# Patient Record
Sex: FEMALE | Race: WHITE | NOT HISPANIC OR LATINO | Employment: OTHER | URBAN - NONMETROPOLITAN AREA
[De-identification: names, ages, dates, MRNs, and addresses within clinical notes are randomized per-mention and may not be internally consistent; named-entity substitution may affect disease eponyms.]

---

## 2017-01-12 RX ORDER — LISINOPRIL 10 MG/1
TABLET ORAL
Refills: 0 | COMMUNITY
Start: 2016-11-12 | End: 2018-07-18

## 2017-01-12 RX ORDER — FLUCONAZOLE 150 MG/1
150 TABLET ORAL ONCE
COMMUNITY
End: 2018-07-18

## 2017-01-12 RX ORDER — DEXTROMETHORPHAN HYDROBROMIDE AND PROMETHAZINE HYDROCHLORIDE 15; 6.25 MG/5ML; MG/5ML
SYRUP ORAL 4 TIMES DAILY PRN
COMMUNITY
End: 2018-07-18

## 2017-01-12 RX ORDER — BENZONATATE 100 MG/1
CAPSULE ORAL
Refills: 0 | COMMUNITY
Start: 2016-11-12 | End: 2018-07-18

## 2017-01-12 RX ORDER — AMOXICILLIN AND CLAVULANATE POTASSIUM 500; 125 MG/1; MG/1
TABLET, FILM COATED ORAL
Refills: 0 | COMMUNITY
Start: 2016-11-12 | End: 2017-06-20

## 2017-06-20 ENCOUNTER — HOSPITAL ENCOUNTER (EMERGENCY)
Facility: HOSPITAL | Age: 62
Discharge: HOME | End: 2017-06-20
Attending: FAMILY MEDICINE | Admitting: NURSE PRACTITIONER

## 2017-06-20 ENCOUNTER — APPOINTMENT (OUTPATIENT)
Dept: GENERAL RADIOLOGY | Facility: HOSPITAL | Age: 62
End: 2017-06-20

## 2017-06-20 VITALS
HEART RATE: 76 BPM | TEMPERATURE: 98.7 F | RESPIRATION RATE: 18 BRPM | SYSTOLIC BLOOD PRESSURE: 154 MMHG | HEIGHT: 64 IN | DIASTOLIC BLOOD PRESSURE: 83 MMHG | WEIGHT: 175 LBS | OXYGEN SATURATION: 94 % | BODY MASS INDEX: 29.88 KG/M2

## 2017-06-20 DIAGNOSIS — M79.602 LEFT ARM PAIN: ICD-10-CM

## 2017-06-20 DIAGNOSIS — IMO0001 ELEVATED BP: Primary | ICD-10-CM

## 2017-06-20 LAB
ALBUMIN SERPL-MCNC: 4.2 G/DL (ref 3.4–4.8)
ALBUMIN/GLOB SERPL: 1.1 G/DL (ref 1.1–1.8)
ALP SERPL-CCNC: 107 U/L (ref 38–126)
ALT SERPL W P-5'-P-CCNC: 39 U/L (ref 9–52)
ANION GAP SERPL CALCULATED.3IONS-SCNC: 13 MMOL/L (ref 5–15)
AST SERPL-CCNC: 30 U/L (ref 14–36)
BASOPHILS # BLD AUTO: 0.04 10*3/MM3 (ref 0–0.2)
BASOPHILS NFR BLD AUTO: 0.5 % (ref 0–2)
BILIRUB SERPL-MCNC: 1 MG/DL (ref 0.2–1.3)
BUN BLD-MCNC: 8 MG/DL (ref 7–21)
BUN/CREAT SERPL: 10.1 (ref 7–25)
CALCIUM SPEC-SCNC: 9.1 MG/DL (ref 8.4–10.2)
CHLORIDE SERPL-SCNC: 100 MMOL/L (ref 95–110)
CK MB SERPL-CCNC: 0.5 NG/ML (ref 0–5)
CK SERPL-CCNC: 65 U/L (ref 30–135)
CO2 SERPL-SCNC: 29 MMOL/L (ref 22–31)
CREAT BLD-MCNC: 0.79 MG/DL (ref 0.5–1)
DEPRECATED RDW RBC AUTO: 43.5 FL (ref 36.4–46.3)
EOSINOPHIL # BLD AUTO: 0.16 10*3/MM3 (ref 0–0.7)
EOSINOPHIL NFR BLD AUTO: 2 % (ref 0–7)
ERYTHROCYTE [DISTWIDTH] IN BLOOD BY AUTOMATED COUNT: 13.1 % (ref 11.5–14.5)
GFR SERPL CREATININE-BSD FRML MDRD: 74 ML/MIN/1.73 (ref 45–104)
GLOBULIN UR ELPH-MCNC: 3.7 GM/DL (ref 2.3–3.5)
GLUCOSE BLD-MCNC: 85 MG/DL (ref 60–100)
HCT VFR BLD AUTO: 44.5 % (ref 35–45)
HGB BLD-MCNC: 15.1 G/DL (ref 12–15.5)
HOLD SPECIMEN: NORMAL
HOLD SPECIMEN: NORMAL
IMM GRANULOCYTES # BLD: 0.03 10*3/MM3 (ref 0–0.02)
IMM GRANULOCYTES NFR BLD: 0.4 % (ref 0–0.5)
LYMPHOCYTES # BLD AUTO: 2.93 10*3/MM3 (ref 0.6–4.2)
LYMPHOCYTES NFR BLD AUTO: 35.9 % (ref 10–50)
MCH RBC QN AUTO: 31.2 PG (ref 26.5–34)
MCHC RBC AUTO-ENTMCNC: 33.9 G/DL (ref 31.4–36)
MCV RBC AUTO: 91.9 FL (ref 80–98)
MONOCYTES # BLD AUTO: 0.43 10*3/MM3 (ref 0–0.9)
MONOCYTES NFR BLD AUTO: 5.3 % (ref 0–12)
NEUTROPHILS # BLD AUTO: 4.58 10*3/MM3 (ref 2–8.6)
NEUTROPHILS NFR BLD AUTO: 55.9 % (ref 37–80)
PLATELET # BLD AUTO: 243 10*3/MM3 (ref 150–450)
PMV BLD AUTO: 10.1 FL (ref 8–12)
POTASSIUM BLD-SCNC: 3.8 MMOL/L (ref 3.5–5.1)
PROT SERPL-MCNC: 7.9 G/DL (ref 6.3–8.6)
RBC # BLD AUTO: 4.84 10*6/MM3 (ref 3.77–5.16)
SODIUM BLD-SCNC: 142 MMOL/L (ref 137–145)
TROPONIN I SERPL-MCNC: <0.012 NG/ML
WBC NRBC COR # BLD: 8.17 10*3/MM3 (ref 3.2–9.8)
WHOLE BLOOD HOLD SPECIMEN: NORMAL
WHOLE BLOOD HOLD SPECIMEN: NORMAL

## 2017-06-20 PROCEDURE — 84484 ASSAY OF TROPONIN QUANT: CPT | Performed by: NURSE PRACTITIONER

## 2017-06-20 PROCEDURE — 71020 HC CHEST PA AND LATERAL: CPT

## 2017-06-20 PROCEDURE — 93010 ELECTROCARDIOGRAM REPORT: CPT | Performed by: INTERNAL MEDICINE

## 2017-06-20 PROCEDURE — 99285 EMERGENCY DEPT VISIT HI MDM: CPT

## 2017-06-20 PROCEDURE — 85025 COMPLETE CBC W/AUTO DIFF WBC: CPT | Performed by: FAMILY MEDICINE

## 2017-06-20 PROCEDURE — 82553 CREATINE MB FRACTION: CPT | Performed by: NURSE PRACTITIONER

## 2017-06-20 PROCEDURE — 93005 ELECTROCARDIOGRAM TRACING: CPT | Performed by: NURSE PRACTITIONER

## 2017-06-20 PROCEDURE — 82550 ASSAY OF CK (CPK): CPT | Performed by: NURSE PRACTITIONER

## 2017-06-20 PROCEDURE — 80053 COMPREHEN METABOLIC PANEL: CPT | Performed by: FAMILY MEDICINE

## 2017-06-20 RX ORDER — LISINOPRIL 10 MG/1
10 TABLET ORAL DAILY
Qty: 20 TABLET | Refills: 0 | Status: SHIPPED | OUTPATIENT
Start: 2017-06-20 | End: 2020-09-12 | Stop reason: HOSPADM

## 2017-06-20 RX ORDER — HYDROCODONE BITARTRATE AND ACETAMINOPHEN 5; 325 MG/1; MG/1
1 TABLET ORAL ONCE
Status: COMPLETED | OUTPATIENT
Start: 2017-06-20 | End: 2017-06-20

## 2017-06-20 RX ORDER — CLONIDINE HYDROCHLORIDE 0.2 MG/1
0.2 TABLET ORAL ONCE
Status: COMPLETED | OUTPATIENT
Start: 2017-06-20 | End: 2017-06-20

## 2017-06-20 RX ORDER — HYDROCODONE BITARTRATE AND ACETAMINOPHEN 5; 325 MG/1; MG/1
1 TABLET ORAL EVERY 8 HOURS PRN
Qty: 15 TABLET | Refills: 0 | Status: SHIPPED | OUTPATIENT
Start: 2017-06-20 | End: 2021-08-17

## 2017-06-20 RX ORDER — SODIUM CHLORIDE 0.9 % (FLUSH) 0.9 %
10 SYRINGE (ML) INJECTION AS NEEDED
Status: DISCONTINUED | OUTPATIENT
Start: 2017-06-20 | End: 2017-06-20 | Stop reason: HOSPADM

## 2017-06-20 RX ADMIN — CLONIDINE HYDROCHLORIDE 0.2 MG: 0.2 TABLET ORAL at 18:00

## 2017-06-20 RX ADMIN — HYDROCODONE BITARTRATE AND ACETAMINOPHEN 1 TABLET: 5; 325 TABLET ORAL at 19:30

## 2017-06-20 NOTE — ED PROVIDER NOTES
"Subjective   HPI Comments: Sent from [email or web address] in [place] for elevated BP. Also c/o left arm pain. States she has chronic left arm pain. Pain is worse today, went to clinic and was told she had to come to ED. No fever, n/v/d. + smoker. Hx of HTN and has been off the meds for \"a long time\".      History provided by:  Patient      Review of Systems   Constitutional: Negative.    HENT: Positive for congestion.    Eyes: Negative.    Respiratory: Positive for cough.    Cardiovascular: Negative.    Gastrointestinal: Negative.    Genitourinary: Negative.    Musculoskeletal: Negative.    Skin: Negative.    Neurological: Negative.    Psychiatric/Behavioral: Negative.        Past Medical History:   Diagnosis Date   • Acute bronchitis, unspecified    • Acute exacerbation of chronic bronchitis    • Acute exacerbation of chronic bronchitis    • Acute frontal sinusitis, unspecified    • Acute laryngitis    • Allergic rhinitis    • Anxiety    • Cancer    • Chronic back pain    • Encounter for therapeutic drug monitoring    • Family history of polyps in the colon    • H/O bone density study 02/11/2014   • H/O mammogram 02/11/2014   • Hyperlipidemia    • Hypertension    • Malignant neoplasm of female breast      L breast, sp mastectomy      • Osteopenia    • Panic disorder    • Right lower quadrant pain     rule out appendicitis          Allergies   Allergen Reactions   • Augmentin [Amoxicillin-Pot Clavulanate]    • Ciprofloxacin    • Codeine    • Eggs Or Egg-Derived Products    • Paxil [Paroxetine Hcl]        Past Surgical History:   Procedure Laterality Date   • INJECTION OF MEDICATION  05/26/2016    Silveriokimi(3)   • MASTECTOMY  2003    left breast for cancer   • PAP SMEAR  01/23/2013    negative for malignancy       History reviewed. No pertinent family history.    Social History     Social History   • Marital status: Legally      Spouse name: N/A   • Number of children: N/A   • Years of education: N/A     Social " "History Main Topics   • Smoking status: Current Every Day Smoker     Packs/day: 0.50   • Smokeless tobacco: None   • Alcohol use No   • Drug use: No   • Sexual activity: Not Asked     Other Topics Concern   • None     Social History Narrative   • None           Objective   Physical Exam   Constitutional: She is oriented to person, place, and time. She appears well-developed and well-nourished.   HENT:   Head: Normocephalic.   Eyes: EOM are normal. Pupils are equal, round, and reactive to light.   Neck: Normal range of motion. Neck supple.   Cardiovascular: Normal rate, regular rhythm and normal heart sounds.    Pulmonary/Chest: Effort normal and breath sounds normal.   Abdominal: Soft. Bowel sounds are normal.   Musculoskeletal: Normal range of motion. She exhibits no edema.   Neurological: She is alert and oriented to person, place, and time.   Skin: Skin is warm and dry.   Nursing note and vitals reviewed.  /83  Pulse 76  Temp 98.7 °F (37.1 °C)  Resp 18  Ht 64\" (162.6 cm)  Wt 175 lb (79.4 kg)  SpO2 94%  BMI 30.04 kg/m2      ECG 12 Lead    Date/Time: 6/20/2017 6:22 PM  Performed by: ESTRELLITA VARMA  Authorized by: ESTRELLITA VARMA   Interpreted by physician  Previous ECG: no previous ECG available  Rhythm: sinus rhythm  Rate: normal  BPM: 72  Clinical impression: normal ECG               ED Course  ED Course      Results for orders placed or performed during the hospital encounter of 06/20/17   Comprehensive Metabolic Panel   Result Value Ref Range    Glucose 85 60 - 100 mg/dL    BUN 8 7 - 21 mg/dL    Creatinine 0.79 0.50 - 1.00 mg/dL    Sodium 142 137 - 145 mmol/L    Potassium 3.8 3.5 - 5.1 mmol/L    Chloride 100 95 - 110 mmol/L    CO2 29.0 22.0 - 31.0 mmol/L    Calcium 9.1 8.4 - 10.2 mg/dL    Total Protein 7.9 6.3 - 8.6 g/dL    Albumin 4.20 3.40 - 4.80 g/dL    ALT (SGPT) 39 9 - 52 U/L    AST (SGOT) 30 14 - 36 U/L    Alkaline Phosphatase 107 38 - 126 U/L    Total Bilirubin 1.0 0.2 - 1.3 mg/dL    eGFR Non "  Amer 74 45 - 104 mL/min/1.73    Globulin 3.7 (H) 2.3 - 3.5 gm/dL    A/G Ratio 1.1 1.1 - 1.8 g/dL    BUN/Creatinine Ratio 10.1 7.0 - 25.0    Anion Gap 13.0 5.0 - 15.0 mmol/L   CBC Auto Differential   Result Value Ref Range    WBC 8.17 3.20 - 9.80 10*3/mm3    RBC 4.84 3.77 - 5.16 10*6/mm3    Hemoglobin 15.1 12.0 - 15.5 g/dL    Hematocrit 44.5 35.0 - 45.0 %    MCV 91.9 80.0 - 98.0 fL    MCH 31.2 26.5 - 34.0 pg    MCHC 33.9 31.4 - 36.0 g/dL    RDW 13.1 11.5 - 14.5 %    RDW-SD 43.5 36.4 - 46.3 fl    MPV 10.1 8.0 - 12.0 fL    Platelets 243 150 - 450 10*3/mm3    Neutrophil % 55.9 37.0 - 80.0 %    Lymphocyte % 35.9 10.0 - 50.0 %    Monocyte % 5.3 0.0 - 12.0 %    Eosinophil % 2.0 0.0 - 7.0 %    Basophil % 0.5 0.0 - 2.0 %    Immature Grans % 0.4 0.0 - 0.5 %    Neutrophils, Absolute 4.58 2.00 - 8.60 10*3/mm3    Lymphocytes, Absolute 2.93 0.60 - 4.20 10*3/mm3    Monocytes, Absolute 0.43 0.00 - 0.90 10*3/mm3    Eosinophils, Absolute 0.16 0.00 - 0.70 10*3/mm3    Basophils, Absolute 0.04 0.00 - 0.20 10*3/mm3    Immature Grans, Absolute 0.03 (H) 0.00 - 0.02 10*3/mm3   CK   Result Value Ref Range    Creatine Kinase 65 30 - 135 U/L   CK-MB   Result Value Ref Range    CKMB 0.50 0.00 - 5.00 ng/mL   Troponin   Result Value Ref Range    Troponin I <0.012 <=0.034 ng/mL   Light Blue Top   Result Value Ref Range    Extra Tube hold for add-on    Green Top (Gel)   Result Value Ref Range    Extra Tube Hold for add-ons.    Lavender Top   Result Value Ref Range    Extra Tube hold for add-on    Gold Top - SST   Result Value Ref Range    Extra Tube Hold for add-ons.        XR Chest 2 View   Final Result   CONCLUSION: No evidence of active disease.      Electronically signed by:  Ramírez Cooper MD  6/20/2017 5:50 PM CDT   Workstation: [facility]              HEART Score  History: Slightly suspicious (+0)  ECG: Normal (+0)  Age: 45 through 65 (+1)  Risk Factors: 3 or more risk factors OR history of atherosclerotic disease (+2)  Troponin:  Normal limit or lower (+0)  Total: 3         MDM  Number of Diagnoses or Management Options  Elevated BP:   Left arm pain:   Diagnosis management comments: [id number] Read[facility] #[id number]  Sent from clinic due to BP, which was 223/103 on arrival and left arm pain. She insists the left arm pain is chronic. She had several lymph nodes removed with left mastectomy several years ago and states she has pain in left arm ever since. Also admits to having been on BP meds in past but quit taking them. BP was down to 172/102 when I seen her, then after clonidine down to 154/83. Labs including cardiac enzymes negative. CXR and EKG nml. Will give Rx few norco for the arm pain and lisinopril for the BP and she will follow up with PCP.      Final diagnoses:   Elevated BP   Left arm pain            Jacquelin Jack, APRN  06/21/17 0558

## 2017-06-21 NOTE — DISCHARGE INSTRUCTIONS
Do not drive while taking the pain medication.  Take the lisinopril daily.  Follow up with your PCP in next week.

## 2018-06-28 ENCOUNTER — TRANSCRIBE ORDERS (OUTPATIENT)
Dept: ORTHOPEDIC SURGERY | Facility: CLINIC | Age: 63
End: 2018-06-28

## 2018-06-28 DIAGNOSIS — M77.9 BONE SPUR: Primary | ICD-10-CM

## 2018-07-06 ENCOUNTER — TRANSCRIBE ORDERS (OUTPATIENT)
Dept: MRI IMAGING | Facility: HOSPITAL | Age: 63
End: 2018-07-06

## 2018-07-06 DIAGNOSIS — M54.5 LOW BACK PAIN, UNSPECIFIED BACK PAIN LATERALITY, UNSPECIFIED CHRONICITY, WITH SCIATICA PRESENCE UNSPECIFIED: Primary | ICD-10-CM

## 2018-07-09 ENCOUNTER — APPOINTMENT (OUTPATIENT)
Dept: MRI IMAGING | Facility: HOSPITAL | Age: 63
End: 2018-07-09

## 2018-07-17 ENCOUNTER — HOSPITAL ENCOUNTER (OUTPATIENT)
Dept: MRI IMAGING | Facility: HOSPITAL | Age: 63
Discharge: HOME | End: 2018-07-17
Admitting: PAIN MEDICINE

## 2018-07-17 DIAGNOSIS — M54.5 LOW BACK PAIN, UNSPECIFIED BACK PAIN LATERALITY, UNSPECIFIED CHRONICITY, WITH SCIATICA PRESENCE UNSPECIFIED: ICD-10-CM

## 2018-07-17 PROCEDURE — 72148 MRI LUMBAR SPINE W/O DYE: CPT

## 2018-08-21 ENCOUNTER — TRANSCRIBE ORDERS (OUTPATIENT)
Dept: PHYSICAL THERAPY | Facility: HOSPITAL | Age: 63
End: 2018-08-21

## 2018-08-21 ENCOUNTER — HOSPITAL ENCOUNTER (OUTPATIENT)
Dept: PHYSICAL THERAPY | Facility: HOSPITAL | Age: 63
Setting detail: THERAPIES SERIES
Discharge: HOME | End: 2018-08-21

## 2018-08-21 DIAGNOSIS — M54.5 LOW BACK PAIN, UNSPECIFIED BACK PAIN LATERALITY, UNSPECIFIED CHRONICITY, WITH SCIATICA PRESENCE UNSPECIFIED: Primary | ICD-10-CM

## 2018-08-21 DIAGNOSIS — G89.29 CHRONIC LOW BACK PAIN, UNSPECIFIED BACK PAIN LATERALITY, WITH SCIATICA PRESENCE UNSPECIFIED: Primary | ICD-10-CM

## 2018-08-21 DIAGNOSIS — M54.5 CHRONIC LOW BACK PAIN, UNSPECIFIED BACK PAIN LATERALITY, WITH SCIATICA PRESENCE UNSPECIFIED: Primary | ICD-10-CM

## 2018-08-21 PROCEDURE — 97161 PT EVAL LOW COMPLEX 20 MIN: CPT | Performed by: PHYSICAL THERAPIST

## 2018-08-21 NOTE — THERAPY EVALUATION
Outpatient Physical Therapy Ortho Initial Evaluation  [facility]     Patient Name: Theresa Esquivel  : 1955  MRN: [id number]  Today's Date: 2018      Visit Date: 2018  Attendance:   Subjective % Improvement: N/A  Recert Date: 2018  MD appointment: TBD    Therapy Diagnosis: Chronic low back pain      There is no problem list on file for this patient.       Past Medical History:   Diagnosis Date   • Acute bronchitis, unspecified    • Acute exacerbation of chronic bronchitis (CMS/HCC)    • Acute exacerbation of chronic bronchitis (CMS/HCC)    • Acute frontal sinusitis, unspecified    • Acute laryngitis    • Allergic rhinitis    • Anxiety    • Cancer (CMS/HCC)    • Chronic back pain    • Encounter for therapeutic drug monitoring    • Family history of polyps in the colon    • H/O bone density study 2014   • H/O mammogram 2014   • Hyperlipidemia    • Hypertension    • Malignant neoplasm of female breast (CMS/HCC)      L breast, sp mastectomy      • Osteopenia    • Panic disorder    • Right lower quadrant pain     rule out appendicitis           Past Surgical History:   Procedure Laterality Date   • INJECTION OF MEDICATION  2016    Ramon(3)   • MASTECTOMY      left breast for cancer   • PAP SMEAR  2013    negative for malignancy       Visit Dx:     ICD-10-CM ICD-9-CM   1. Low back pain, unspecified back pain laterality, unspecified chronicity, with sciatica presence unspecified M54.5 724.2             Patient History     Row Name 18 1305             History    Chief Complaint Pain;Difficulty Walking  -BB      Type of Pain Back pain  -BB      Date Current Problem(s) Began --   chronic   -BB      Brief Description of Current Complaint Present today with chronic low back pain. Patient reports living alone and doing laundry and dishes increase pain. Reports being able to stand only short periods of time of about 5-10 minutes. Reports walks short distances  "prior to pain setting in. Denies having a specific incident to cause low back pain. Denies consistant radicular symptoms. Reports having a episode of BLE going numb that \"got me down about a week\"  -BB      Patient/Caregiver Goals Improve mobility;Relieve pain  -BB      Hand Dominance left-handed  -BB      Occupation/sports/leisure activities Retired   -BB      What clinical tests have you had for this problem? MRI  -BB      Results of Clinical Tests Per patient: he hasnt said anything really. \"old fractures\"   -BB         Pain     Pain Location Back  -BB      Pain at Present 6   with medication   -BB      Pain at Best 4  -BB      Pain at Worst 8  -BB      Pain Frequency Constant/continuous  -BB      Pain Description Aching;Dull;Throbbing  -BB      Tolerance Time- Standing 5-10 minutes   -BB      Tolerance Time- Walking short distances   -BB      Is your sleep disturbed? Yes  -BB      Is medication used to assist with sleep? Yes  -BB         Fall Risk Assessment    Any falls in the past year: No  -BB        User Key  (r) = Recorded By, (t) = Taken By, (c) = Cosigned By    Initials Name Provider Type    Maida Winston PT Physical Therapist                PT Ortho     Row Name 08/21/18 4929       Subjective Comments    Subjective Comments See patient history   -BB       Precautions and Contraindications    Precautions Hx of breast cancer  -BB       Subjective Pain    Able to rate subjective pain? yes  -BB    Pre-Treatment Pain Level 6  -BB    Post-Treatment Pain Level 6  -BB       Posture/Observations    Posture/Observations Comments Lying down right leg appears longer than left, when measured GT to tib tub right leg measures shorter than left with bilateral tib tub to medial mallelous. No acute distress. Has hard time lying supine.   -BB       Special Tests/Palpation    Special Tests/Palpation Lumbar/SI  -BB       Lumbosacral Palpation    Lumbosacral Palpation? Yes  -BB    SI Tender;Bilateral:   L>R  -BB    " Spinous Process --   Poor lying tolerance-limited joint mobility exam  -BB    Piriformis Tender;Bilateral:   L>R  -BB    Quadratus Lumborum Tender;Guarded/taut;Bilateral:  -BB    Erector Spinae (Paraspinals) Bilateral:;Tender;Guarded/taut  -BB       General ROM    GENERAL ROM COMMENTS Trunk total ROM is limited in all planes to about 50% total range without a significant change in pain. Pain remained about the same.   -BB       MMT (Manual Muscle Testing)    Additional Documentation General Assessment (Manual Muscle Testing) (Group)  -BB       General Assessment (Manual Muscle Testing)    Comment, General Manual Muscle Testing (MMT) Assessment Right hip and knee in sitting grossly 4-/5, left hip and knee in sitting grossly 3+/5    -BB       Sensation    Sensation WNL? WNL  -BB    Light Touch No apparent deficits  -BB       Balance Skills Training    Balance Comments Sway noted in standing-mild with wide ANNMARIE   -BB       Gait/Stairs Assessment/Training    Comment (Gait/Stairs) No assistive device, slight antalgics noted with a foward flexed posture.   -BB      User Key  (r) = Recorded By, (t) = Taken By, (c) = Cosigned By    Initials Name Provider Type    Maida Winston PT Physical Therapist                      Therapy Education  Education Details: POC  Given: Other (comment)  Program: New  How Provided: Verbal  Provided to: Patient  Level of Understanding: Verbalized           PT OP Goals     Row Name 08/21/18 1935          PT Short Term Goals    STG Date to Achieve 09/18/18  -BB     STG 1 Independent in HEP   -BB     STG 2 Decrease pain to intermittent vs constant   -BB     STG 3 BLE MMT of hip and knee grossly 4/5 or better   -BB     STG 4 Improve standing tolerance to greater than 15' minutes   -BB     STG 5 Modified Oswestry of 45% or less   -BB        Time Calculation    PT Goal Re-Cert Due Date 09/11/18  -BB       User Key  (r) = Recorded By, (t) = Taken By, (c) = Cosigned By    Initials Name Provider Type     Maida Winston PT Physical Therapist                PT Assessment/Plan     Row Name 08/21/18 6913          PT Assessment    Functional Limitations Impaired gait;Limitation in home management;Limitations in community activities;Limitations in functional capacity and performance  -BB     Impairments Range of motion;Posture;Poor body mechanics;Muscle strength;Pain;Gait;Endurance;Balance;Impaired muscle endurance  -BB     Assessment Comments Patient presents today with chronic low back pain and BLE weakness with L>R. Patient has limited tolerance to lying down limiting exam. Patient does appear to have a possible leg length deficit with right leg appearing funcitonally longer than left.This will be monitored as a possible SI malalignment as well due to limitations to supine tolerance. Patient was very cooperative with exam. It is recommended patient to try 1x land and 1x aquatics to assist in improving tissue mobility, improve strength and decrease pain of the lumbar spine.    -BB     Rehab Potential Good  -BB     Patient/caregiver participated in establishment of treatment plan and goals Yes  -BB     Patient would benefit from skilled therapy intervention Yes  -BB        PT Plan    PT Frequency 2x/week  -BB     Predicted Duration of Therapy Intervention (Therapy Eval) 4 weeks   -BB     Planned CPT's? PT EVAL LOW COMPLEXITY: 32512;PT RE-EVAL: 95394;PT THER PROC EA 15 MIN: 89106;PT THER ACT EA 15 MIN: 49357;PT MANUAL THERAPY EA 15 MIN: 85529;PT AQUATIC THERAPY EA 15 MIN: 72847;PT THER SUPP EA 15 MIN  -BB     PT Plan Comments Lumbar stabilization, stretching, manual therapy, strengthening, aquatics, ice/heat PRN   Recheck patients leg length   -BB       User Key  (r) = Recorded By, (t) = Taken By, (c) = Cosigned By    Initials Name Provider Type    Maida Winston PT Physical Therapist                  Exercises     Row Name 08/21/18 4547             Subjective Comments    Subjective Comments See patient history    -BB         Subjective Pain    Able to rate subjective pain? yes  -BB      Pre-Treatment Pain Level 6  -BB      Post-Treatment Pain Level 6  -BB        User Key  (r) = Recorded By, (t) = Taken By, (c) = Cosigned By    Initials Name Provider Type    Maida Winston, PT Physical Therapist                        Outcome Measure Options: Prabhu Estrada  Modified Oswestry  Modified Oswestry Score/Comments: 64%      Time Calculation:     Therapy Suggested Charges     Code   Minutes Charges    None             Start Time: 1305  Stop Time: 1336  Time Calculation (min): 31 min  Total Timed Code Minutes- PT: 0 minute(s) (eval only )     Therapy Charges for Today     Code Description Service Date Service Provider Modifiers Qty    69645210674 HC PT EVAL LOW COMPLEXITY 2 8/21/2018 Maida Sheridan, PT GP 1          PT G-Codes  Outcome Measure Options: Prabhu Sheridan, PT  8/21/2018

## 2018-09-12 ENCOUNTER — HOSPITAL ENCOUNTER (OUTPATIENT)
Dept: PHYSICAL THERAPY | Facility: HOSPITAL | Age: 63
Setting detail: THERAPIES SERIES
Discharge: HOME | End: 2018-09-12

## 2018-09-12 DIAGNOSIS — M54.5 LOW BACK PAIN, UNSPECIFIED BACK PAIN LATERALITY, UNSPECIFIED CHRONICITY, WITH SCIATICA PRESENCE UNSPECIFIED: Primary | ICD-10-CM

## 2018-09-12 PROCEDURE — 97110 THERAPEUTIC EXERCISES: CPT

## 2018-09-12 NOTE — THERAPY TREATMENT NOTE
Outpatient Physical Therapy Ortho Treatment Note  [facility]     Patient Name: Theresa Esquivel  : 1955  MRN: [id number]  Today's Date: 2018      Visit Date: 2018     Sub imp 0%  Visit 2/3 (Eval +8)  MD KEMP  Re 18    Visit Dx:    ICD-10-CM ICD-9-CM   1. Low back pain, unspecified back pain laterality, unspecified chronicity, with sciatica presence unspecified M54.5 724.2       There is no problem list on file for this patient.       Past Medical History:   Diagnosis Date   • Acute bronchitis, unspecified    • Acute exacerbation of chronic bronchitis (CMS/HCC)    • Acute exacerbation of chronic bronchitis (CMS/HCC)    • Acute frontal sinusitis, unspecified    • Acute laryngitis    • Allergic rhinitis    • Anxiety    • Cancer (CMS/HCC)    • Chronic back pain    • Encounter for therapeutic drug monitoring    • Family history of polyps in the colon    • H/O bone density study 2014   • H/O mammogram 2014   • Hyperlipidemia    • Hypertension    • Malignant neoplasm of female breast (CMS/HCC)      L breast, sp mastectomy      • Osteopenia    • Panic disorder    • Right lower quadrant pain     rule out appendicitis           Past Surgical History:   Procedure Laterality Date   • INJECTION OF MEDICATION  2016    Gagandeepjenniekimi(3)   • MASTECTOMY      left breast for cancer   • PAP SMEAR  2013    negative for malignancy             PT Ortho     Row Name 18 1100       Precautions and Contraindications    Precautions (P)  Hx of breast cancer  -DENNIS       Posture/Observations    Posture/Observations Comments (P)  LL R > L  -DENNIS      User Key  (r) = Recorded By, (t) = Taken By, (c) = Cosigned By    Initials Name Provider Type    Jake Graham, ATC                             PT Assessment/Plan     Row Name 18 1123          PT Assessment    Assessment Comments (P)  EX sheets for New HEP, Difficulty with secondary to Pain. LL R>L . Pt. unable to activate  "muscle to complete MET. Reports unable to do pool.   -DENNIS        PT Plan    PT Frequency (P)  2x/week  -DENNIS     PT Plan Comments (P)  Cont per POC. Progress as able. Recert next.   -DENNIS       User Key  (r) = Recorded By, (t) = Taken By, (c) = Cosigned By    Initials Name Provider Type    Jake Graham, ATC                     Exercises     Row Name 09/12/18 1100             Subjective Comments    Subjective Comments (P)  Reports increased back pain today. Hurting alot R>L  -DENNIS         Subjective Pain    Able to rate subjective pain? (P)  yes  -DENNIS      Pre-Treatment Pain Level (P)  8  -DENNIS      Post-Treatment Pain Level (P)  --   7.5  -DENNIS         Exercise 1    Exercise Name 1 (P)  Standing Ham stretch  -DENNIS      Sets 1 (P)  3  -DENNIS      Time 1 (P)  30\"  -DENNIS         Exercise 2    Exercise Name 2 (P)  Supine piriformis stretch  -DENNIS      Sets 2 (P)  3  -DENNIS      Time 2 (P)  30\"  -DENNIS         Exercise 3    Exercise Name 3 (P)  LTR  -DENNIS      Sets 3 (P)  1  -DENNIS      Reps 3 (P)  10  -DENNIS         Exercise 4    Exercise Name 4 (P)  Trans Ab with Add  -DENNIS      Sets 4 (P)  1  -DENNIS      Reps 4 (P)  10  -DENNIS         Exercise 5    Exercise Name 5 (P)  Seated Ham ball iso  -DENNIS      Sets 5 (P)  1  -DENNIS      Reps 5 (P)  10  -DENNIS        User Key  (r) = Recorded By, (t) = Taken By, (c) = Cosigned By    Initials Name Provider Type    Jake Graham, ATC                                PT OP Goals     Row Name 09/12/18 1000          PT Short Term Goals    STG Date to Achieve (P)  09/18/18  -DENNIS     STG 1 (P)  Independent in HEP   -DENNIS     STG 2 (P)  Decrease pain to intermittent vs constant   -DENNIS     STG 3 (P)  BLE MMT of hip and knee grossly 4/5 or better   -DENNIS     STG 4 (P)  Improve standing tolerance to greater than 15' minutes   -DENNIS     STG 5 (P)  Modified Oswestry of 45% or less   -DENNIS       User Key  (r) = Recorded By, (t) = Taken By, (c) = Cosigned By    Initials Name Provider Type    Jake Graham ATC "           Therapy Education  Given: (P) HEP  Program: (P) New  How Provided: (P) Verbal, Written  Provided to: (P) Patient              Time Calculation:   Start Time: (P) 1105  Stop Time: (P) 1149  Time Calculation (min): (P) 44 min  Total Timed Code Minutes- PT: (P) 30 minute(s)  Therapy Suggested Charges     Code   Minutes Charges    None           Therapy Charges for Today     Code Description Service Date Service Provider Modifiers Qty    68123269715 HC PT THER SUPP EA 15 MIN 9/12/2018 Jake Taveras, ATC  1    68984041236 HC PT THER PROC EA 15 MIN 9/12/2018 Jake Taveras, ATC  2                    Jake Taveras ATC  9/12/2018

## 2018-09-18 ENCOUNTER — HOSPITAL ENCOUNTER (OUTPATIENT)
Dept: PHYSICAL THERAPY | Facility: HOSPITAL | Age: 63
Setting detail: THERAPIES SERIES
Discharge: HOME | End: 2018-09-18

## 2018-09-18 DIAGNOSIS — M54.5 LOW BACK PAIN, UNSPECIFIED BACK PAIN LATERALITY, UNSPECIFIED CHRONICITY, WITH SCIATICA PRESENCE UNSPECIFIED: Primary | ICD-10-CM

## 2018-09-18 PROCEDURE — 97110 THERAPEUTIC EXERCISES: CPT | Performed by: PHYSICAL THERAPIST

## 2018-09-18 PROCEDURE — 97140 MANUAL THERAPY 1/> REGIONS: CPT | Performed by: PHYSICAL THERAPIST

## 2018-09-18 NOTE — THERAPY PROGRESS REPORT/RE-CERT
"    Outpatient Physical Therapy Ortho Progress Note  [facility]     Patient Name: Theresa Esquivel  : 1955  MRN: [id number]  Today's Date: 2018      Visit Date: 2018  Attendance: 3/4 (Eval + 8 through 18)  Subjective Improvement: 0%  Next MD Appt: VIRIDIANA  Recert Date: 10/9/18    Changes in Medications: none noted  Changes in MD Orders: none noted  Number of Work Days Lost: n/a       Past Medical History:   Diagnosis Date   • Acute bronchitis, unspecified    • Acute exacerbation of chronic bronchitis (CMS/HCC)    • Acute exacerbation of chronic bronchitis (CMS/HCC)    • Acute frontal sinusitis, unspecified    • Acute laryngitis    • Allergic rhinitis    • Anxiety    • Cancer (CMS/HCC)    • Chronic back pain    • Encounter for therapeutic drug monitoring    • Family history of polyps in the colon    • H/O bone density study 2014   • H/O mammogram 2014   • Hyperlipidemia    • Hypertension    • Malignant neoplasm of female breast (CMS/HCC)      L breast, sp mastectomy      • Osteopenia    • Panic disorder    • Right lower quadrant pain     rule out appendicitis           Past Surgical History:   Procedure Laterality Date   • INJECTION OF MEDICATION  2016    Ramon(3)   • MASTECTOMY      left breast for cancer   • PAP SMEAR  2013    negative for malignancy       Visit Dx:     ICD-10-CM ICD-9-CM   1. Low back pain, unspecified back pain laterality, unspecified chronicity, with sciatica presence unspecified M54.5 724.2                 PT Ortho     Row Name 18 1100       Subjective Comments    Subjective Comments Pain decreased at present. \"I took my medicine today. It helps.\" Pain is across the low back. \"It's got me where I don't want to do nothing. I don't want to be here right now to tell you the truth.\" No medication changes. 0% subjective improvement. Reports therapy intensifies her pain. Pain worse on right side. Times when both legs are numb.   -SS       " Precautions and Contraindications    Precautions Hx of breast cancer  -SS       Subjective Pain    Able to rate subjective pain? yes  -SS    Pre-Treatment Pain Level 4  -SS    Post-Treatment Pain Level --   4.5  -SS    Subjective Pain Comment declines modalities post-treatment  -SS       Posture/Observations    Posture/Observations Comments Patient is anxious. Guarded gait and movement.  -SS       Lumbosacral Palpation    SI --   long R LE with apparent anterior rotation R hemipelvis  -SS    Quadratus Lumborum Bilateral:;Tender;Guarded/taut  -SS    Erector Spinae (Paraspinals) Left:;Tender;Guarded/taut  -SS       Head/Neck/Trunk    Trunk Extension AROM 15; L>R LBP  -SS    Trunk Flexion AROM 60; B LBP  -SS    Trunk Lt Lateral Flexion AROM 12; L LB/lumbar paraspinal pain  -SS    Trunk Rt Lateral Flexion AROM 10; R LBP  -SS      User Key  (r) = Recorded By, (t) = Taken By, (c) = Cosigned By    Initials Name Provider Type    SS Satnam Garcia, PT DPT Physical Therapist                      Therapy Education  Education Details: seated trans ab set, heat/ice for pain  Given: HEP, Symptoms/condition management  Program: Modified  How Provided: Verbal  Provided to: Patient  Level of Understanding: Verbalized, Demonstrated           PT OP Goals     Row Name 09/18/18 1100          STG Date to Achieve 10/09/18  -    STG 1 Independent in HEP   -    STG 1 Progress Not Met;Ongoing  -    STG 2 Decrease pain to intermittent vs constant   -    STG 2 Progress Not Met;Ongoing  -    STG 3 BLE MMT of hip and knee grossly 4/5 or better   -    STG 3 Progress Not Met;Ongoing  -    STG 4 Improve standing tolerance to greater than 15' minutes   -    STG 4 Progress Not Met;Ongoing  -    STG 5 Modified Oswestry of 45% or less   -    STG 5 Progress Not Met;Ongoing  -          PT Goal Re-Cert Due Date 10/09/18  -      User Key  (r) = Recorded By, (t) = Taken By, (c) = Cosigned By    Initials Name Provider Type     " Satnam Garcia, PT DPT Physical Therapist                PT Assessment/Plan     Row Name 09/18/18 1100          Functional Limitations Impaired gait;Limitation in home management;Limitations in community activities;Limitations in functional capacity and performance  -    Impairments Range of motion;Posture;Poor body mechanics;Muscle strength;Pain;Gait;Endurance;Balance;Impaired muscle endurance  -    Assessment Comments Alignment corrected after muscle energy. Patient appears less anxious after treatment. Patient unable to swim and is somewhat afraid of water. Aquatics deferred at this time.   -    Rehab Potential Good   barrier: chronicity  -SS    Patient/caregiver participated in establishment of treatment plan and goals Yes  -SS    Patient would benefit from skilled therapy intervention Yes  -SS          PT Frequency 2x/week  -SS    Predicted Duration of Therapy Intervention (Therapy Eval) 3-4 weeks  -    PT Plan Comments Manual therapy, gentle stretching, trunk and LE strengthening, heat/ice as needed for pain. Okay for IFC estim to low back for pain.  -      User Key  (r) = Recorded By, (t) = Taken By, (c) = Cosigned By    Initials Name Provider Type     Satnam Garcia, PT DPT Physical Therapist                  Exercises     Row Name 09/18/18 1100          Existing Precautions/Restrictions --   Hx of breast cancer  -          Subjective Comments Pain decreased at present. \"I took my medicine today. It helps.\" Pain is across the low back. \"It's got me where I don't want to do nothing. I don't want to be here right now to tell you the truth.\" No medication changes. 0% subjective improvement. Reports therapy intensifies her pain. Pain worse on right side. Times when both legs are numb.   -SS          Able to rate subjective pain? yes  -SS    Pre-Treatment Pain Level 4  -SS    Post-Treatment Pain Level --   4.5  -SS    Subjective Pain Comment declines modalities post-treatment  -SS "          Exercise Name 1 Pro2, Seat 9, LE   -SS    Cueing 1 Verbal  -SS    Time 1 10 min  -SS    Additional Comments Level 1  -SS          Exercise Name 2 Incline calf stretch  -SS    Cueing 2 Verbal;Demo  -SS    Sets 2 3  -SS    Time 2 30 sec hold  -SS          Exercise Name 3 Seated HS stretch - B  -SS    Cueing 3 Verbal;Demo  -SS    Sets 3 3  -SS    Time 3 30 sec hold  -SS          Exercise Name 4 ME -- see Manual  -SS          Exercise Name 5 Seated trans ab set  -SS    Cueing 5 Verbal  -SS    Sets 5 1  -SS    Reps 5 10  -SS    Time 5 5 sec hold  -SS      User Key  (r) = Recorded By, (t) = Taken By, (c) = Cosigned By    Initials Name Provider Type    Satnam Costa, PT DPT Physical Therapist           Manual Rx (last 36 hours)      Manual Treatments     Row Name 09/18/18 1100          Manual Rx 1 Location R SI  -SS    Manual Rx 1 Type ME anterior rotation correction  -SS          Manual Rx 2 Type ME shotgun  -SS      User Key  (r) = Recorded By, (t) = Taken By, (c) = Cosigned By    Initials Name Provider Type    Satnam Costa, PT DPT Physical Therapist                      Outcome Measure Options: Modified [place]  Modified Oswestry  Modified Oswestry Score/Comments: 30/50 = 60%      Time Calculation:         Start Time: 1109  Stop Time: 1148  Time Calculation (min): 39 min     Therapy Charges for Today     Code Description Service Date Service Provider Modifiers Qty    21887153348  PT MANUAL THERAPY EA 15 MIN 9/18/2018 Satnam Garcia PT DPT GP 1    [id number]  PT THER PROC EA 15 MIN 9/18/2018 Satnam Garcia, PT DPT GP 2                   Satnam Garcia, PT, DPT, CHT  9/18/2018

## 2018-09-20 ENCOUNTER — APPOINTMENT (OUTPATIENT)
Dept: PHYSICAL THERAPY | Facility: HOSPITAL | Age: 63
End: 2018-09-20

## 2019-01-07 ENCOUNTER — DOCUMENTATION (OUTPATIENT)
Dept: PHYSICAL THERAPY | Facility: HOSPITAL | Age: 64
End: 2019-01-07

## 2020-09-04 ENCOUNTER — APPOINTMENT (OUTPATIENT)
Dept: GENERAL RADIOLOGY | Facility: HOSPITAL | Age: 65
End: 2020-09-04

## 2020-09-04 ENCOUNTER — HOSPITAL ENCOUNTER (INPATIENT)
Facility: HOSPITAL | Age: 65
LOS: 7 days | Discharge: HOME HEALTH | End: 2020-09-12
Attending: EMERGENCY MEDICINE | Admitting: INTERNAL MEDICINE

## 2020-09-04 DIAGNOSIS — R79.89 ELEVATED D-DIMER: ICD-10-CM

## 2020-09-04 DIAGNOSIS — J96.92 RESPIRATORY FAILURE WITH HYPOXIA AND HYPERCAPNIA, UNSPECIFIED CHRONICITY (HCC): Primary | ICD-10-CM

## 2020-09-04 DIAGNOSIS — J18.9 PNEUMONIA OF BOTH LUNGS DUE TO INFECTIOUS ORGANISM, UNSPECIFIED PART OF LUNG: ICD-10-CM

## 2020-09-04 DIAGNOSIS — Z74.09 IMPAIRED MOBILITY AND ADLS: ICD-10-CM

## 2020-09-04 DIAGNOSIS — R13.13 PHARYNGEAL DYSPHAGIA: ICD-10-CM

## 2020-09-04 DIAGNOSIS — Z74.09 IMPAIRED FUNCTIONAL MOBILITY, BALANCE, GAIT, AND ENDURANCE: ICD-10-CM

## 2020-09-04 DIAGNOSIS — Z78.9 IMPAIRED MOBILITY AND ADLS: ICD-10-CM

## 2020-09-04 DIAGNOSIS — J96.91 RESPIRATORY FAILURE WITH HYPOXIA AND HYPERCAPNIA, UNSPECIFIED CHRONICITY (HCC): Primary | ICD-10-CM

## 2020-09-04 LAB
ALBUMIN SERPL-MCNC: 4.1 G/DL (ref 3.5–5.2)
ALBUMIN/GLOB SERPL: 1.1 G/DL
ALP SERPL-CCNC: 115 U/L (ref 39–117)
ALT SERPL W P-5'-P-CCNC: 22 U/L (ref 1–33)
ANION GAP SERPL CALCULATED.3IONS-SCNC: 25 MMOL/L (ref 5–15)
AST SERPL-CCNC: 30 U/L (ref 1–32)
BILIRUB SERPL-MCNC: 0.5 MG/DL (ref 0–1.2)
BUN SERPL-MCNC: 10 MG/DL (ref 8–23)
BUN/CREAT SERPL: 8.8 (ref 7–25)
CALCIUM SPEC-SCNC: 9.4 MG/DL (ref 8.6–10.5)
CHLORIDE SERPL-SCNC: 95 MMOL/L (ref 98–107)
CO2 SERPL-SCNC: 18 MMOL/L (ref 22–29)
CREAT SERPL-MCNC: 1.13 MG/DL (ref 0.57–1)
GFR SERPL CREATININE-BSD FRML MDRD: 48 ML/MIN/1.73
GLOBULIN UR ELPH-MCNC: 3.8 GM/DL
GLUCOSE SERPL-MCNC: 234 MG/DL (ref 65–99)
NT-PROBNP SERPL-MCNC: 146.8 PG/ML (ref 0–900)
POTASSIUM SERPL-SCNC: 3.6 MMOL/L (ref 3.5–5.2)
PROT SERPL-MCNC: 7.9 G/DL (ref 6–8.5)
SODIUM SERPL-SCNC: 138 MMOL/L (ref 136–145)
TROPONIN T SERPL-MCNC: <0.01 NG/ML (ref 0–0.03)

## 2020-09-04 PROCEDURE — 5A1945Z RESPIRATORY VENTILATION, 24-96 CONSECUTIVE HOURS: ICD-10-PCS | Performed by: EMERGENCY MEDICINE

## 2020-09-04 PROCEDURE — 25010000002 SUCCINYLCHOLINE PER 20 MG: Performed by: EMERGENCY MEDICINE

## 2020-09-04 PROCEDURE — 0BH17EZ INSERTION OF ENDOTRACHEAL AIRWAY INTO TRACHEA, VIA NATURAL OR ARTIFICIAL OPENING: ICD-10-PCS | Performed by: EMERGENCY MEDICINE

## 2020-09-04 PROCEDURE — 25010000002 PROPOFOL 10 MG/ML EMULSION

## 2020-09-04 PROCEDURE — 94799 UNLISTED PULMONARY SVC/PX: CPT

## 2020-09-04 PROCEDURE — 85025 COMPLETE CBC W/AUTO DIFF WBC: CPT | Performed by: EMERGENCY MEDICINE

## 2020-09-04 PROCEDURE — 85379 FIBRIN DEGRADATION QUANT: CPT | Performed by: EMERGENCY MEDICINE

## 2020-09-04 PROCEDURE — 31500 INSERT EMERGENCY AIRWAY: CPT

## 2020-09-04 PROCEDURE — 80053 COMPREHEN METABOLIC PANEL: CPT | Performed by: EMERGENCY MEDICINE

## 2020-09-04 PROCEDURE — 93005 ELECTROCARDIOGRAM TRACING: CPT | Performed by: EMERGENCY MEDICINE

## 2020-09-04 PROCEDURE — 71045 X-RAY EXAM CHEST 1 VIEW: CPT

## 2020-09-04 PROCEDURE — 93010 ELECTROCARDIOGRAM REPORT: CPT | Performed by: INTERNAL MEDICINE

## 2020-09-04 PROCEDURE — 84484 ASSAY OF TROPONIN QUANT: CPT | Performed by: EMERGENCY MEDICINE

## 2020-09-04 PROCEDURE — 85007 BL SMEAR W/DIFF WBC COUNT: CPT | Performed by: EMERGENCY MEDICINE

## 2020-09-04 PROCEDURE — 99291 CRITICAL CARE FIRST HOUR: CPT

## 2020-09-04 PROCEDURE — 83880 ASSAY OF NATRIURETIC PEPTIDE: CPT | Performed by: EMERGENCY MEDICINE

## 2020-09-04 RX ORDER — SUCCINYLCHOLINE CHLORIDE 20 MG/ML
100 INJECTION INTRAMUSCULAR; INTRAVENOUS ONCE
Status: COMPLETED | OUTPATIENT
Start: 2020-09-04 | End: 2020-09-04

## 2020-09-04 RX ORDER — ETOMIDATE 2 MG/ML
20 INJECTION INTRAVENOUS ONCE
Status: COMPLETED | OUTPATIENT
Start: 2020-09-04 | End: 2020-09-04

## 2020-09-04 RX ORDER — PROPOFOL 10 MG/ML
VIAL (ML) INTRAVENOUS
Status: COMPLETED
Start: 2020-09-04 | End: 2020-09-04

## 2020-09-04 RX ORDER — SODIUM CHLORIDE 0.9 % (FLUSH) 0.9 %
10 SYRINGE (ML) INJECTION AS NEEDED
Status: DISCONTINUED | OUTPATIENT
Start: 2020-09-04 | End: 2020-09-12 | Stop reason: HOSPADM

## 2020-09-04 RX ADMIN — PROPOFOL 5 MCG/KG/MIN: 10 INJECTION, EMULSION INTRAVENOUS at 23:02

## 2020-09-04 RX ADMIN — SUCCINYLCHOLINE CHLORIDE 100 MG: 20 INJECTION, SOLUTION INTRAMUSCULAR; INTRAVENOUS at 22:56

## 2020-09-04 RX ADMIN — ETOMIDATE 20 MG: 2 INJECTION, SOLUTION INTRAVENOUS at 22:55

## 2020-09-05 ENCOUNTER — APPOINTMENT (OUTPATIENT)
Dept: CT IMAGING | Facility: HOSPITAL | Age: 65
End: 2020-09-05

## 2020-09-05 PROBLEM — J96.92 RESPIRATORY FAILURE WITH HYPOXIA AND HYPERCAPNIA: Status: ACTIVE | Noted: 2020-09-05

## 2020-09-05 PROBLEM — J96.91 RESPIRATORY FAILURE WITH HYPOXIA AND HYPERCAPNIA: Status: ACTIVE | Noted: 2020-09-05

## 2020-09-05 LAB
ANION GAP SERPL CALCULATED.3IONS-SCNC: 12 MMOL/L (ref 5–15)
ARTERIAL PATENCY WRIST A: POSITIVE
ARTERIAL PATENCY WRIST A: POSITIVE
ATMOSPHERIC PRESS: 754 MMHG
ATMOSPHERIC PRESS: 754 MMHG
BASE EXCESS BLDA CALC-SCNC: -1.8 MMOL/L (ref 0–2)
BASE EXCESS BLDA CALC-SCNC: -3.2 MMOL/L (ref 0–2)
BASOPHILS # BLD AUTO: 0.01 10*3/MM3 (ref 0–0.2)
BASOPHILS # BLD AUTO: 0.1 10*3/MM3 (ref 0–0.2)
BASOPHILS # BLD MANUAL: 0.16 10*3/MM3 (ref 0–0.2)
BASOPHILS NFR BLD AUTO: 0.1 % (ref 0–1.5)
BASOPHILS NFR BLD AUTO: 0.6 % (ref 0–1.5)
BASOPHILS NFR BLD AUTO: 1 % (ref 0–1.5)
BDY SITE: ABNORMAL
BDY SITE: ABNORMAL
BUN SERPL-MCNC: 15 MG/DL (ref 8–23)
BUN/CREAT SERPL: 15.2 (ref 7–25)
CALCIUM SPEC-SCNC: 9.2 MG/DL (ref 8.6–10.5)
CHLORIDE SERPL-SCNC: 103 MMOL/L (ref 98–107)
CO2 SERPL-SCNC: 23 MMOL/L (ref 22–29)
CREAT SERPL-MCNC: 0.99 MG/DL (ref 0.57–1)
CRP SERPL-MCNC: 0.49 MG/DL (ref 0–0.5)
D-DIMER, QUANTITATIVE (MAD,POW, STR): 2592 NG/ML (FEU) (ref 0–470)
D-DIMER, QUANTITATIVE (MAD,POW, STR): >4000 NG/ML (FEU) (ref 0–470)
D-LACTATE SERPL-SCNC: 2.9 MMOL/L (ref 0.5–2)
D-LACTATE SERPL-SCNC: 3.3 MMOL/L (ref 0.5–2)
DEPRECATED RDW RBC AUTO: 40.9 FL (ref 37–54)
DEPRECATED RDW RBC AUTO: 43.1 FL (ref 37–54)
EOSINOPHIL # BLD AUTO: 0 10*3/MM3 (ref 0–0.4)
EOSINOPHIL # BLD AUTO: 0.17 10*3/MM3 (ref 0–0.4)
EOSINOPHIL NFR BLD AUTO: 0 % (ref 0.3–6.2)
EOSINOPHIL NFR BLD AUTO: 1.1 % (ref 0.3–6.2)
ERYTHROCYTE [DISTWIDTH] IN BLOOD BY AUTOMATED COUNT: 12.4 % (ref 12.3–15.4)
ERYTHROCYTE [DISTWIDTH] IN BLOOD BY AUTOMATED COUNT: 12.7 % (ref 12.3–15.4)
GFR SERPL CREATININE-BSD FRML MDRD: 56 ML/MIN/1.73
GLUCOSE BLDC GLUCOMTR-MCNC: 147 MG/DL (ref 70–130)
GLUCOSE BLDC GLUCOMTR-MCNC: 159 MG/DL (ref 70–130)
GLUCOSE SERPL-MCNC: 180 MG/DL (ref 65–99)
HBA1C MFR BLD: 5.9 % (ref 4.8–5.6)
HCO3 BLDA-SCNC: 23 MMOL/L (ref 20–26)
HCO3 BLDA-SCNC: 23.2 MMOL/L (ref 20–26)
HCT VFR BLD AUTO: 43.2 % (ref 34–46.6)
HCT VFR BLD AUTO: 49.9 % (ref 34–46.6)
HGB BLD-MCNC: 14.6 G/DL (ref 12–15.9)
HGB BLD-MCNC: 16.2 G/DL (ref 12–15.9)
HOLD SPECIMEN: NORMAL
HOLD SPECIMEN: NORMAL
IMM GRANULOCYTES # BLD AUTO: 0.06 10*3/MM3 (ref 0–0.05)
IMM GRANULOCYTES # BLD AUTO: 0.17 10*3/MM3 (ref 0–0.05)
IMM GRANULOCYTES NFR BLD AUTO: 0.5 % (ref 0–0.5)
IMM GRANULOCYTES NFR BLD AUTO: 1.1 % (ref 0–0.5)
INHALED O2 CONCENTRATION: 100 %
INHALED O2 CONCENTRATION: 50 %
LACTATE HOLD SPECIMEN: NORMAL
LARGE PLATELETS: ABNORMAL
LYMPHOCYTES # BLD AUTO: 0.79 10*3/MM3 (ref 0.7–3.1)
LYMPHOCYTES # BLD AUTO: 8.79 10*3/MM3 (ref 0.7–3.1)
LYMPHOCYTES # BLD MANUAL: 9.38 10*3/MM3 (ref 0.7–3.1)
LYMPHOCYTES NFR BLD AUTO: 55.3 % (ref 19.6–45.3)
LYMPHOCYTES NFR BLD AUTO: 6.8 % (ref 19.6–45.3)
LYMPHOCYTES NFR BLD MANUAL: 59 % (ref 19.6–45.3)
LYMPHOCYTES NFR BLD MANUAL: 6 % (ref 5–12)
Lab: ABNORMAL
Lab: ABNORMAL
MAGNESIUM SERPL-MCNC: 2.1 MG/DL (ref 1.6–2.4)
MCH RBC QN AUTO: 30.3 PG (ref 26.6–33)
MCH RBC QN AUTO: 30.5 PG (ref 26.6–33)
MCHC RBC AUTO-ENTMCNC: 32.5 G/DL (ref 31.5–35.7)
MCHC RBC AUTO-ENTMCNC: 33.8 G/DL (ref 31.5–35.7)
MCV RBC AUTO: 89.6 FL (ref 79–97)
MCV RBC AUTO: 94 FL (ref 79–97)
MODALITY: ABNORMAL
MODALITY: ABNORMAL
MONOCYTES # BLD AUTO: 0.21 10*3/MM3 (ref 0.1–0.9)
MONOCYTES # BLD AUTO: 0.95 10*3/MM3 (ref 0.1–0.9)
MONOCYTES # BLD AUTO: 1.14 10*3/MM3 (ref 0.1–0.9)
MONOCYTES NFR BLD AUTO: 1.8 % (ref 5–12)
MONOCYTES NFR BLD AUTO: 7.2 % (ref 5–12)
NEUTROPHILS # BLD AUTO: 4.45 10*3/MM3 (ref 1.7–7)
NEUTROPHILS NFR BLD AUTO: 10.61 10*3/MM3 (ref 1.7–7)
NEUTROPHILS NFR BLD AUTO: 34.7 % (ref 42.7–76)
NEUTROPHILS NFR BLD AUTO: 5.53 10*3/MM3 (ref 1.7–7)
NEUTROPHILS NFR BLD AUTO: 90.8 % (ref 42.7–76)
NEUTROPHILS NFR BLD MANUAL: 24 % (ref 42.7–76)
NEUTS BAND NFR BLD MANUAL: 4 % (ref 0–5)
NRBC BLD AUTO-RTO: 0 /100 WBC (ref 0–0.2)
NRBC BLD AUTO-RTO: 0 /100 WBC (ref 0–0.2)
PAW @ PEAK INSP FLOW SETTING VENT: 21 CMH2O
PCO2 BLDA: 38.4 MM HG (ref 35–45)
PCO2 BLDA: 45.3 MM HG (ref 35–45)
PEEP RESPIRATORY: 5 CM[H2O]
PEEP RESPIRATORY: 5 CM[H2O]
PH BLDA: 7.32 PH UNITS (ref 7.35–7.45)
PH BLDA: 7.38 PH UNITS (ref 7.35–7.45)
PHOSPHATE SERPL-MCNC: 2.8 MG/DL (ref 2.5–4.5)
PLATELET # BLD AUTO: 297 10*3/MM3 (ref 140–450)
PLATELET # BLD AUTO: 363 10*3/MM3 (ref 140–450)
PMV BLD AUTO: 10.9 FL (ref 6–12)
PMV BLD AUTO: 9.9 FL (ref 6–12)
PO2 BLDA: 176 MM HG (ref 83–108)
PO2 BLDA: 92.5 MM HG (ref 83–108)
POTASSIUM SERPL-SCNC: 3.5 MMOL/L (ref 3.5–5.2)
RBC # BLD AUTO: 4.82 10*6/MM3 (ref 3.77–5.28)
RBC # BLD AUTO: 5.31 10*6/MM3 (ref 3.77–5.28)
RBC MORPH BLD: NORMAL
SAO2 % BLDCOA: 96.2 % (ref 94–99)
SAO2 % BLDCOA: 98.9 % (ref 94–99)
SARS-COV-2 N GENE RESP QL NAA+PROBE: NOT DETECTED
SARS-COV-2 N GENE RESP QL NAA+PROBE: NOT DETECTED
SET MECH RESP RATE: 20
SET MECH RESP RATE: 20
SMALL PLATELETS BLD QL SMEAR: ADEQUATE
SMUDGE CELLS IN BLOOD BY LIGHT MICROSCOPY: 5 /100 WBC
SODIUM SERPL-SCNC: 138 MMOL/L (ref 136–145)
VARIANT LYMPHS NFR BLD MANUAL: 6 % (ref 0–5)
VENTILATOR MODE: AC
VENTILATOR MODE: AC
VT ON VENT VENT: 550 ML
WBC # BLD AUTO: 11.68 10*3/MM3 (ref 3.4–10.8)
WBC # BLD AUTO: 15.9 10*3/MM3 (ref 3.4–10.8)
WBC MORPH BLD: NORMAL
WHOLE BLOOD HOLD SPECIMEN: NORMAL
WHOLE BLOOD HOLD SPECIMEN: NORMAL

## 2020-09-05 PROCEDURE — 94002 VENT MGMT INPAT INIT DAY: CPT

## 2020-09-05 PROCEDURE — 86140 C-REACTIVE PROTEIN: CPT | Performed by: INTERNAL MEDICINE

## 2020-09-05 PROCEDURE — 94799 UNLISTED PULMONARY SVC/PX: CPT

## 2020-09-05 PROCEDURE — 87205 SMEAR GRAM STAIN: CPT | Performed by: INTERNAL MEDICINE

## 2020-09-05 PROCEDURE — 25010000002 ENOXAPARIN PER 10 MG: Performed by: INTERNAL MEDICINE

## 2020-09-05 PROCEDURE — 85379 FIBRIN DEGRADATION QUANT: CPT | Performed by: INTERNAL MEDICINE

## 2020-09-05 PROCEDURE — 71275 CT ANGIOGRAPHY CHEST: CPT

## 2020-09-05 PROCEDURE — 82803 BLOOD GASES ANY COMBINATION: CPT

## 2020-09-05 PROCEDURE — 25010000002 MIDAZOLAM 50 MG/10ML SOLUTION: Performed by: INTERNAL MEDICINE

## 2020-09-05 PROCEDURE — 36415 COLL VENOUS BLD VENIPUNCTURE: CPT | Performed by: EMERGENCY MEDICINE

## 2020-09-05 PROCEDURE — 0 IOPAMIDOL PER 1 ML: Performed by: INTERNAL MEDICINE

## 2020-09-05 PROCEDURE — 87070 CULTURE OTHR SPECIMN AEROBIC: CPT | Performed by: INTERNAL MEDICINE

## 2020-09-05 PROCEDURE — 83605 ASSAY OF LACTIC ACID: CPT | Performed by: EMERGENCY MEDICINE

## 2020-09-05 PROCEDURE — 87635 SARS-COV-2 COVID-19 AMP PRB: CPT | Performed by: INTERNAL MEDICINE

## 2020-09-05 PROCEDURE — 87635 SARS-COV-2 COVID-19 AMP PRB: CPT | Performed by: EMERGENCY MEDICINE

## 2020-09-05 PROCEDURE — 36600 WITHDRAWAL OF ARTERIAL BLOOD: CPT

## 2020-09-05 PROCEDURE — 63710000001 INSULIN ASPART PER 5 UNITS: Performed by: INTERNAL MEDICINE

## 2020-09-05 PROCEDURE — 25010000002 AZITHROMYCIN PER 500 MG: Performed by: EMERGENCY MEDICINE

## 2020-09-05 PROCEDURE — 85025 COMPLETE CBC W/AUTO DIFF WBC: CPT | Performed by: INTERNAL MEDICINE

## 2020-09-05 PROCEDURE — 83735 ASSAY OF MAGNESIUM: CPT | Performed by: INTERNAL MEDICINE

## 2020-09-05 PROCEDURE — 25010000002 PROPOFOL 10 MG/ML EMULSION: Performed by: EMERGENCY MEDICINE

## 2020-09-05 PROCEDURE — 84100 ASSAY OF PHOSPHORUS: CPT | Performed by: INTERNAL MEDICINE

## 2020-09-05 PROCEDURE — 82962 GLUCOSE BLOOD TEST: CPT

## 2020-09-05 PROCEDURE — 87040 BLOOD CULTURE FOR BACTERIA: CPT | Performed by: EMERGENCY MEDICINE

## 2020-09-05 PROCEDURE — 83036 HEMOGLOBIN GLYCOSYLATED A1C: CPT | Performed by: INTERNAL MEDICINE

## 2020-09-05 PROCEDURE — 25010000002 CEFTRIAXONE PER 250 MG: Performed by: EMERGENCY MEDICINE

## 2020-09-05 PROCEDURE — 25010000002 DEXAMETHASONE PER 1 MG: Performed by: INTERNAL MEDICINE

## 2020-09-05 PROCEDURE — 80048 BASIC METABOLIC PNL TOTAL CA: CPT | Performed by: INTERNAL MEDICINE

## 2020-09-05 RX ORDER — SODIUM CHLORIDE 0.9 % (FLUSH) 0.9 %
10 SYRINGE (ML) INJECTION AS NEEDED
Status: DISCONTINUED | OUTPATIENT
Start: 2020-09-05 | End: 2020-09-12 | Stop reason: HOSPADM

## 2020-09-05 RX ORDER — FAMOTIDINE 10 MG/ML
20 INJECTION, SOLUTION INTRAVENOUS DAILY
Status: DISCONTINUED | OUTPATIENT
Start: 2020-09-05 | End: 2020-09-12 | Stop reason: HOSPADM

## 2020-09-05 RX ORDER — DEXTROSE MONOHYDRATE 25 G/50ML
25 INJECTION, SOLUTION INTRAVENOUS
Status: DISCONTINUED | OUTPATIENT
Start: 2020-09-05 | End: 2020-09-12 | Stop reason: HOSPADM

## 2020-09-05 RX ORDER — NICOTINE POLACRILEX 4 MG
15 LOZENGE BUCCAL
Status: DISCONTINUED | OUTPATIENT
Start: 2020-09-05 | End: 2020-09-12 | Stop reason: HOSPADM

## 2020-09-05 RX ORDER — SODIUM CHLORIDE 0.9 % (FLUSH) 0.9 %
10 SYRINGE (ML) INJECTION EVERY 12 HOURS SCHEDULED
Status: DISCONTINUED | OUTPATIENT
Start: 2020-09-05 | End: 2020-09-12 | Stop reason: HOSPADM

## 2020-09-05 RX ORDER — DEXAMETHASONE SODIUM PHOSPHATE 4 MG/ML
3 INJECTION, SOLUTION INTRA-ARTICULAR; INTRALESIONAL; INTRAMUSCULAR; INTRAVENOUS; SOFT TISSUE EVERY 12 HOURS
Status: DISCONTINUED | OUTPATIENT
Start: 2020-09-05 | End: 2020-09-06

## 2020-09-05 RX ORDER — SODIUM CHLORIDE, SODIUM LACTATE, POTASSIUM CHLORIDE, CALCIUM CHLORIDE 600; 310; 30; 20 MG/100ML; MG/100ML; MG/100ML; MG/100ML
50 INJECTION, SOLUTION INTRAVENOUS CONTINUOUS
Status: DISCONTINUED | OUTPATIENT
Start: 2020-09-05 | End: 2020-09-06

## 2020-09-05 RX ADMIN — PROPOFOL 50 MCG/KG/MIN: 10 INJECTION, EMULSION INTRAVENOUS at 01:43

## 2020-09-05 RX ADMIN — INSULIN ASPART 2 UNITS: 100 INJECTION, SOLUTION INTRAVENOUS; SUBCUTANEOUS at 06:47

## 2020-09-05 RX ADMIN — DEXAMETHASONE SODIUM PHOSPHATE 3 MG: 4 INJECTION, SOLUTION INTRAMUSCULAR; INTRAVENOUS at 12:41

## 2020-09-05 RX ADMIN — AZITHROMYCIN MONOHYDRATE 500 MG: 500 INJECTION, POWDER, LYOPHILIZED, FOR SOLUTION INTRAVENOUS at 01:49

## 2020-09-05 RX ADMIN — SODIUM CHLORIDE, PRESERVATIVE FREE 10 ML: 5 INJECTION INTRAVENOUS at 21:47

## 2020-09-05 RX ADMIN — SODIUM CHLORIDE, PRESERVATIVE FREE 10 ML: 5 INJECTION INTRAVENOUS at 02:09

## 2020-09-05 RX ADMIN — ENOXAPARIN SODIUM 40 MG: 40 INJECTION SUBCUTANEOUS at 02:13

## 2020-09-05 RX ADMIN — IOPAMIDOL 66 ML: 755 INJECTION, SOLUTION INTRAVENOUS at 02:15

## 2020-09-05 RX ADMIN — SODIUM CHLORIDE 1 G: 900 INJECTION INTRAVENOUS at 01:43

## 2020-09-05 RX ADMIN — PROPOFOL 30 MCG/KG/MIN: 10 INJECTION, EMULSION INTRAVENOUS at 23:29

## 2020-09-05 RX ADMIN — SODIUM CHLORIDE, POTASSIUM CHLORIDE, SODIUM LACTATE AND CALCIUM CHLORIDE 50 ML/HR: 600; 310; 30; 20 INJECTION, SOLUTION INTRAVENOUS at 23:31

## 2020-09-05 RX ADMIN — PROPOFOL 40 MCG/KG/MIN: 10 INJECTION, EMULSION INTRAVENOUS at 18:11

## 2020-09-05 RX ADMIN — PROPOFOL 40 MCG/KG/MIN: 10 INJECTION, EMULSION INTRAVENOUS at 00:35

## 2020-09-05 RX ADMIN — ENOXAPARIN SODIUM 40 MG: 40 INJECTION SUBCUTANEOUS at 20:56

## 2020-09-05 RX ADMIN — PROPOFOL 20 MCG/KG/MIN: 10 INJECTION, EMULSION INTRAVENOUS at 12:42

## 2020-09-05 RX ADMIN — FAMOTIDINE 20 MG: 10 INJECTION INTRAVENOUS at 12:47

## 2020-09-05 RX ADMIN — SODIUM CHLORIDE, PRESERVATIVE FREE 10 ML: 5 INJECTION INTRAVENOUS at 21:49

## 2020-09-05 RX ADMIN — MIDAZOLAM HYDROCHLORIDE 5 MG/HR: 1 INJECTION, SOLUTION INTRAMUSCULAR; INTRAVENOUS at 13:05

## 2020-09-05 RX ADMIN — AZITHROMYCIN MONOHYDRATE 500 MG: 500 INJECTION, POWDER, LYOPHILIZED, FOR SOLUTION INTRAVENOUS at 23:36

## 2020-09-05 RX ADMIN — CEFTRIAXONE 1 G: 1 INJECTION, POWDER, FOR SOLUTION INTRAMUSCULAR; INTRAVENOUS at 23:04

## 2020-09-05 RX ADMIN — ENOXAPARIN SODIUM 40 MG: 40 INJECTION SUBCUTANEOUS at 10:40

## 2020-09-05 RX ADMIN — DEXAMETHASONE SODIUM PHOSPHATE 3 MG: 4 INJECTION, SOLUTION INTRAMUSCULAR; INTRAVENOUS at 23:01

## 2020-09-05 RX ADMIN — SODIUM CHLORIDE, POTASSIUM CHLORIDE, SODIUM LACTATE AND CALCIUM CHLORIDE 50 ML/HR: 600; 310; 30; 20 INJECTION, SOLUTION INTRAVENOUS at 02:14

## 2020-09-05 RX ADMIN — SODIUM CHLORIDE, PRESERVATIVE FREE 10 ML: 5 INJECTION INTRAVENOUS at 12:49

## 2020-09-05 RX ADMIN — MIDAZOLAM HYDROCHLORIDE 1 MG/HR: 1 INJECTION, SOLUTION INTRAMUSCULAR; INTRAVENOUS at 01:46

## 2020-09-05 NOTE — PROGRESS NOTES
[facility] Medicine Services  INPATIENT PROGRESS NOTE    Length of Stay: 0  Date of Admission: 9/4/2020  Primary Care Physician: Pat Montiel APRN    Subjective   Chief Complaint: Respiratory failure.    HPI: Patient is seen for follow-up.  She is a 64-year-old female with history of nicotine dependence, chronic pain syndrome, chronic bronchitis, hypertension who was admitted for acute hypoxic/hypercapnic respiratory failure requiring endotracheal intubation.  She remains intubated, sedated and restrained.    Review of Systems  Unable to review as the patient is intubated and sedated.  Objective    Temp:  [97.1 °F (36.2 °C)-99.5 °F (37.5 °C)] 99.1 °F (37.3 °C)  Heart Rate:  [] 89  Resp:  [20-40] 20  BP: ()/(66-95) 113/77  FiO2 (%):  [50 %-90 %] 50 %    Vent Settings:  FiO2 (%):  [50 %-90 %] 50 %  S RR:  [20] 20  PEEP/CPAP (cm H2O):  [5 cm H20] 5 cm H20  OR SUP:  [0 cm H20] 0 cm H20  MAP (cm H2O):  [11-12] 11    Physical Exam   Constitutional: She appears well-developed and well-nourished. She is cooperative. No distress. She is sedated, intubated and restrained.   HENT:   Head: Normocephalic and atraumatic.   Right Ear: External ear normal.   Left Ear: External ear normal.   Nose: Nose normal.   Mouth/Throat: Oropharynx is clear and moist.   Eyes: Conjunctivae and EOM are normal.   Neck: Neck supple. No JVD present. No thyromegaly present.   Cardiovascular: Normal rate, regular rhythm, normal heart sounds and intact distal pulses. Exam reveals no gallop and no friction rub.   No murmur heard.  Pulmonary/Chest: Effort normal. No stridor. She is intubated. No respiratory distress. She has decreased breath sounds. She has no wheezes. She has rhonchi. She has no rales. She exhibits no tenderness.   Abdominal: Soft. Bowel sounds are normal. She exhibits no distension and no mass. There is no tenderness. There is no rebound and no guarding. No hernia.      Musculoskeletal: She exhibits no edema, tenderness or deformity.   Neurological: She has normal reflexes. She exhibits normal muscle tone.   Skin: Skin is warm and dry. No rash noted. She is not diaphoretic. No erythema. No pallor.   Nursing note and vitals reviewed.        Medication Review:    Current Facility-Administered Medications:   •  [START ON 9/6/2020] AZITHROMYCIN 500 MG/250 ML 0.9% NS IVPB (vial-mate), 500 mg, Intravenous, Q24H, Dagoberto Crum MD  •  [START ON 9/6/2020] cefTRIAXone (ROCEPHIN) 1 g/100 mL 0.9% NS (MBP), 1 g, Intravenous, Q24H, Dagoberto Crum MD  •  dextrose (D50W) 25 g/ 50mL Intravenous Solution 25 g, 25 g, Intravenous, Q15 Min PRN, Dagoberto Crum MD  •  dextrose (GLUTOSE) oral gel 15 g, 15 g, Oral, Q15 Min PRN, Dagoberto Crum MD  •  enoxaparin (LOVENOX) syringe 40 mg, 40 mg, Subcutaneous, Q12H, Dagoberto Crum MD, 40 mg at 09/05/20 0213  •  glucagon (human recombinant) (GLUCAGEN DIAGNOSTIC) injection 1 mg, 1 mg, Subcutaneous, Q15 Min PRN, Dagoberto Crum MD  •  insulin aspart (novoLOG) injection 0-7 Units, 0-7 Units, Subcutaneous, TID AC, Dagoberto Crum MD, 2 Units at 09/05/20 0647  •  lactated ringers infusion, 50 mL/hr, Intravenous, Continuous, Dagoberto Crum MD, Last Rate: 50 mL/hr at 09/05/20 0357, 50 mL/hr at 09/05/20 0357  •  midazolam (VERSED) 50mg/100mL normal saline infusion, 1 mg/hr, Intravenous, Titrated, Dagoberto Crum MD, Last Rate: 10 mL/hr at 09/05/20 0221, 5 mg/hr at 09/05/20 0221  •  propofol (DIPRIVAN) infusion 10 mg/mL 100 mL, 5-50 mcg/kg/min, Intravenous, Titrated, Raghav Lopez MD, Last Rate: 10.56 mL/hr at 09/05/20 0626, 20 mcg/kg/min at 09/05/20 0626  •  sodium chloride 0.9 % flush 10 mL, 10 mL, Intravenous, PRN, Raghav Lopez MD  •  sodium chloride 0.9 % flush 10 mL, 10 mL, Intravenous, Q12H, Dagoberto Crum MD, 10 mL at 09/05/20 0209  •  sodium chloride 0.9 % flush 10 mL, 10 mL, Intravenous, PRN, Dagoberto Crum  MD    Results Review:  I have reviewed the labs, radiology results, and diagnostic studies.    Laboratory Data:   Results from last 7 days   Lab Units 09/05/20  0532 09/04/20  2316   SODIUM mmol/L 138 138   POTASSIUM mmol/L 3.5 3.6   CHLORIDE mmol/L 103 95*   CO2 mmol/L 23.0 18.0*   BUN mg/dL 15 10   CREATININE mg/dL 0.99 1.13*   GLUCOSE mg/dL 180* 234*   CALCIUM mg/dL 9.2 9.4   BILIRUBIN mg/dL  --  0.5   ALK PHOS U/L  --  115   ALT (SGPT) U/L  --  22   AST (SGOT) U/L  --  30   ANION GAP mmol/L 12.0 25.0*     Estimated Creatinine Clearance: 59 mL/min (by C-G formula based on SCr of 0.99 mg/dL).  Results from last 7 days   Lab Units 09/05/20  0532   MAGNESIUM mg/dL 2.1   PHOSPHORUS mg/dL 2.8         Results from last 7 days   Lab Units 09/05/20  0532 09/04/20  2316   WBC 10*3/mm3 11.68* 15.90*   HEMOGLOBIN g/dL 14.6 16.2*   HEMATOCRIT % 43.2 49.9*   PLATELETS 10*3/mm3 297 363           Culture Data:   No results found for: BLOODCX  No results found for: URINECX  No results found for: RESPCX  No results found for: WOUNDCX  No results found for: STOOLCX  No components found for: BODYFLD    Radiology Data:   Imaging Results (Last 24 Hours)     Procedure Component Value Units Date/Time    CT Angiogram Chest [[id number]] Collected:  09/05/20 0128     Updated:  09/05/20 0200    Narrative:         CT angiogram chest with contrast on 9/5/2020     CLINICAL INDICATION: Shortness of breath, elevated d-dimer    TECHNIQUE: Multiple axial images are obtained throughout the  chest following the administration of IV contrast.  Computer  generated 3D reconstructions/MIPS were performed. This exam was  performed according to our departmental dose-optimization  program, which includes automated exposure control, adjustment of  the mA and/or kV according to patient size and/or use of  iterative reconstruction technique.  Total DLP is 399 mGy*cm.    COMPARISON: None     FINDINGS: ET tube tip is in the midthoracic trachea. NG tube  extends  into the upper stomach in good position. 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 There is fatty infiltration of the liver. Limited  visualized upper abdomen is otherwise unremarkable. There is no  pleural or pericardial effusion. There are no filling defects  within the pulmonary arteries to suggest a pulmonary embolus.  There is evidence of calcified granulomatous disease in the  chest. There is left greater than right lower lobe consolidation  consistent with pneumonia, consider aspiration. There are other  patchy bilateral groundglass and airspace opacities likely also  infectious in nature. Differential diagnosis would include viral  infections. No bony abnormality is noted.      Impression:       1. No evidence of pulmonary embolus.  2. Bilateral lower lobe areas of consolidation greatest in the  left lower lobe consistent with pneumonia with other bilateral  opacities also likely infectious in nature. Imaging features can  be seen with a viral or COVID 19 pneumonia, though are  nonspecific and can occur with a variety of infectious and  noninfectious processes. [PneInd]    Electronically signed by:  Devonte Alvarez  9/5/2020 1:59 AM CDT  Workstation: [id number]    XR Chest 1 View [[id number]] Collected:  09/04/20 2310     Updated:  09/04/20 2328    Narrative:       PORTABLE CHEST X-RAY    CLINICAL HISTORY: CHF/COPD Protocol    COMPARISON: 6/20/2017.    FINDINGS: Portable AP view of the chest was obtained with  overlying monitor leads in place. ET tube terminates at the level  of the mid thoracic trachea. Nasogastric tube is coiled beneath  the left hemidiaphragm terminating in the region of the proximal  stomach. Lungs are poorly aerated. There are left greater than  right groundglass and interstitial opacities favored to be  related to edema rather than pneumonia. There are calcified  residua of granulomatous disease present. No significant pleural  fluid. Normal  heart size.      Impression:       Life support lines as above, lungs are under aerated  with left greater than right pulmonary opacities as discussed      Electronically signed by:  Alfonso Medrano MD  9/4/2020 11:27 PM  CDT Workstation: [id number]          ABG:  Results from last 7 days   Lab Units 09/05/20  0453   PH, ARTERIAL pH units 7.385   PO2 ART mm Hg 92.5   PCO2, ARTERIAL mm Hg 38.4   HCO3 ART mmol/L 23.0       I have reviewed the patient's current medications.     Assessment/Plan     Hospital Problem List:  Active Problems:    Respiratory failure with hypoxia and hypercapnia (CMS/HCC)    Acute respiratory failure with hypoxia and hypercapnia (secondary to bilateral pneumonia to rule out COVID-19 viral infection): Continue ventilatory support, IV antibiotics and inhalers.  Blood culture results are pending and initial COVID-19 PCR was negative.  Check respiratory cultures and repeat COVID-19 PCR in the next 24 hours.    Sepsis secondary to bilateral pneumonia (to rule out COVID-19 viral infection): Continue IV antibiotics.  Reactive leukocytosis and lactic acidosis are improving.  Blood culture results are pending.    Azotemia: Resolved as creatinine is down to 0.99.    Elevated d-dimer is likely due to acute illness and CT pulmonary angiogram is negative for pulmonary embolism.    Hyperglycemia is likely due to acute illness as patient is not a known diabetic.  Hemoglobin A1c is 5.90.  Continue Accu-Cheks and sliding scale insulin.    Hypertension: Lisinopril is on hold secondary to borderline blood pressure.    Nicotine dependence: Begin nicotine patch.  Patient will receive counseling postextubation.    Nutrition: Consult dietitian for NG tube feeding.    Continue GI and DVT prophylaxis.    The patient was evaluated during the global COVID-19 pandemic, and the diagnosis was suspected/considered upon their initial presentation.  Evaluation, treatment, and testing were consistent with current guidelines  for patients who present with complaints or symptoms that may be related to COVID-19.      Discharge Planning: In progress.    Eulogio Gonzalez MD   09/05/20   10:14

## 2020-09-05 NOTE — PAYOR COMM NOTE
"   inpatient admit 9/5/20 0035  Theresa Esquivel (64 y.o. Female)     Date of Birth Social Security Number Address Home Phone MRN    [id number]  [address] [id number] [phone] [id number]    Episcopal Marital Status          Episcopalian Legally        Admission Date Admission Type Admitting Provider Attending Provider Department, Room/Bed    9/4/20 Emergency Dagoberto Crum MD Haigler, Stuart S, MD [facility] CRITICAL CARE STEPDOWN, 06/A    Discharge Date Discharge Disposition Discharge Destination                       Attending Provider:  Dagoberto Crum MD    Allergies:  Augmentin [Amoxicillin-pot Clavulanate], Ciprofloxacin, Codeine, Eggs Or Egg-derived Products, Paxil [Paroxetine Hcl]    Isolation:  None   Infection:  COVID (rule out) (09/05/20)   Code Status:  CPR    Ht:  162.6 cm (64\")   Wt:  80.7 kg (177 lb 14.6 oz)    Admission Cmt:  None   Principal Problem:  None                Active Insurance as of 9/4/2020     Primary Coverage     Payor Plan Insurance Group Employer/Plan Group    [facility]      Payor Plan Address Payor Plan Phone Number Payor Plan Fax Number Effective Dates    [address] [address]   1/1/2014 - None Entered    [place] [address]       Subscriber Name Subscriber Birth Date Member ID       THERESA ESQUIVEL 1955 [id number]                 Emergency Contacts      (Rel.) Home Phone Work Phone Mobile Phone    Connie Collins (Daughter) [phone] -- --               History & Physical      Dagoberto Crum MD at 09/05/20 0137                [facility] Medicine Admission      Date of Admission: 9/4/2020      Primary Care Physician: Pat Montiel APRN      Chief Complaint: No chief complaint patient came in in  extremis requiring intubation    HPI  History from chart only    Briefly this is a 64-year-old female who presented the " "ED carrying the following problem list from chart review    1.  History of tobacco use  2.  History of breast CA remote  3.  History of chronic pain previously on chronic narcotics referred to chronic pain clinic in 2015    Patient presented to the ED was noted to have O2 sats of 74% she was picked up from her apartment base of the same.  She was placed on a nonrebreather mask with no improvement and respiratory rate of 40 and intubated urgently.  ED physicians note states patient noted to have metastatic cancer unclear as to where this came from currently.  Patient reportedly had a past history of CHF or COPD.  Patient is chest x-ray revealed bilateral process as well as \"groundglass appearance.  Chronic white count was noted to be 16,000 with a lymphocyte predominance with a d-dimer of 2500 patient was started on broad-spectrum antibiotics were asked to admit for further evaluation and treatment as well as to rule out COVID.  Patient CTA is currently pending    Concurrent Medical History:  has a past medical history of Acute bronchitis, unspecified, Acute exacerbation of chronic bronchitis (CMS/HCC), Acute exacerbation of chronic bronchitis (CMS/HCC), Acute frontal sinusitis, unspecified, Acute laryngitis, Allergic rhinitis, Anxiety, Cancer (CMS/HCC), Chronic back pain, Encounter for therapeutic drug monitoring, Family history of polyps in the colon, H/O bone density study (02/11/2014), H/O mammogram (02/11/2014), Hyperlipidemia, Hypertension, Malignant neoplasm of female breast (CMS/HCC), Osteopenia, Panic disorder, and Right lower quadrant pain.    Past Surgical History:  has a past surgical history that includes Mastectomy (2003); Pap Smear (01/23/2013); and Injection of Medication (05/26/2016).    Family History: family history is not on file.     Social History:  reports that she has been smoking. She has been smoking about 0.50 packs per day. She does not have any smokeless tobacco history on file. She " reports that she does not drink alcohol or use drugs.    Allergies:   Allergies   Allergen Reactions   • Augmentin [Amoxicillin-Pot Clavulanate]    • Ciprofloxacin    • Codeine    • Eggs Or Egg-Derived Products    • Paxil [Paroxetine Hcl]        Medications:   Prior to Admission medications    Medication Sig Start Date End Date Taking? Authorizing Provider   HYDROcodone-acetaminophen (NORCO) 5-325 MG per tablet Take 1 tablet by mouth Every 8 (Eight) Hours As Needed for Moderate Pain (4-6). 6/20/17   Jacquelin Jack APRN   lisinopril (PRINIVIL,ZESTRIL) 10 MG tablet Take 1 tablet by mouth Daily. 6/20/17   Jacquelin Jack APRN       Review of Systems:  Review of Systems   Unable to perform ROS: Intubated      Otherwise complete ROS is negative except as mentioned above.    Physical Exam:   Temp:  [99.5 °F (37.5 °C)] 99.5 °F (37.5 °C)  Heart Rate:  [112-152] 112  Resp:  [20-40] 20  BP: (111-142)/(76-95) 118/80  FiO2 (%):  [90 %] 90 %  Physical Exam   Constitutional: She is oriented to person, place, and time. She appears well-developed and well-nourished.   HENT:   Head: Normocephalic and atraumatic.   Eyes: Pupils are equal, round, and reactive to light. EOM are normal.   Neck: Normal range of motion. Neck supple.   Cardiovascular: Normal rate and regular rhythm.   Pulmonary/Chest:   Rhonchus sounds   Musculoskeletal: Normal range of motion.   Neurological: She is alert and oriented to person, place, and time.   Skin: Skin is warm and dry. Capillary refill takes less than 2 seconds.   Psychiatric: She has a normal mood and affect.   Nursing note and vitals reviewed.        Results Reviewed:  I have personally reviewed current lab, radiology, and data and agree with results.  Lab Results (last 24 hours)     Procedure Component Value Units Date/Time    Lactic Acid, Plasma [[id number]] Collected:  09/05/20 0046    Specimen:  Blood Updated:  09/05/20 0109    Blood Culture - Blood, Arm, Right [[id number]] Collected:  09/05/20  0046    Specimen:  Blood from Arm, Right Updated:  09/05/20 0109    COVID-19, [facility] IN-HOUSE, NP SWAB IN TRANSPORT MEDIA 8-10 HR TAT - Swab, Nasopharynx [[id number]] Collected:  09/05/20 0056    Specimen:  Swab from Nasopharynx Updated:  09/05/20 0109    [place] Draw [[id number]] Collected:  09/04/20 2316    Specimen:  Blood Updated:  09/05/20 0030    Narrative:       The following orders were created for panel order [place] Draw.  Procedure                               Abnormality         Status                     ---------                               -----------         ------                     Light Blue Top[[id number]]                                   Final result               Green Top (Gel)[[id number]]                                  Final result               Lavender Top[[id number]]                                     Final result               Gold Top - SST[[id number]]                                   Final result                 Please view results for these tests on the individual orders.    Light Blue Top [[id number]] Collected:  09/04/20 2316    Specimen:  Blood Updated:  09/05/20 0030     Extra Tube hold for add-on     Comment: Auto resulted       Green Top (Gel) [[id number]] Collected:  09/04/20 2316    Specimen:  Blood Updated:  09/05/20 0030     Extra Tube Hold for add-ons.     Comment: Auto resulted.       Lavender Top [[id number]] Collected:  09/04/20 2316    Specimen:  Blood Updated:  09/05/20 0030     Extra Tube hold for add-on     Comment: Auto resulted       Gold Top - SST [[id number]] Collected:  09/04/20 2316    Specimen:  Blood Updated:  09/05/20 0030     Extra Tube Hold for add-ons.     Comment: Auto resulted.       Blood Gas, Arterial [[id number]]  (Abnormal) Collected:  09/05/20 0008    Specimen:  Arterial Blood Updated:  09/05/20 0025     Site Left Radial     Husam's Test Positive     pH, Arterial 7.318 pH units      Comment: 84 Value below reference range        pCO2, Arterial 45.3 mm  Hg      Comment: 83 Value above reference range        pO2, Arterial 176.0 mm Hg      Comment: 83 Value above reference range        HCO3, Arterial 23.2 mmol/L      Base Excess, Arterial -3.2 mmol/L      Comment: 84 Value below reference range        O2 Saturation, Arterial 98.9 %      Barometric Pressure for Blood Gas 754 mmHg      Modality Ventilator     FIO2 100 %      Ventilator Mode AC     Set Tidal Volume 550     Set Mech Resp Rate 20.0     PEEP 5.0     Collected by B     Comment: Meter: [id number]     :  [id number]       Manual Differential [[id number]] Collected:  09/04/20 2316    Specimen:  Blood Updated:  09/05/20 0019    CBC & Differential [[id number]] Collected:  09/04/20 2316    Specimen:  Blood Updated:  09/05/20 0019    Narrative:       The following orders were created for panel order CBC & Differential.  Procedure                               Abnormality         Status                     ---------                               -----------         ------                     CBC Auto Differential[[id number]]        Abnormal            Final result                 Please view results for these tests on the individual orders.    CBC Auto Differential [[id number]]  (Abnormal) Collected:  09/04/20 2316    Specimen:  Blood Updated:  09/05/20 0019     WBC 15.90 10*3/mm3      RBC 5.31 10*6/mm3      Hemoglobin 16.2 g/dL      Hematocrit 49.9 %      MCV 94.0 fL      MCH 30.5 pg      MCHC 32.5 g/dL      RDW 12.7 %      RDW-SD 43.1 fl      MPV 10.9 fL      Platelets 363 10*3/mm3      Neutrophil % 34.7 %      Lymphocyte % 55.3 %      Monocyte % 7.2 %      Eosinophil % 1.1 %      Basophil % 0.6 %      Immature Grans % 1.1 %      Neutrophils, Absolute 5.53 10*3/mm3      Lymphocytes, Absolute 8.79 10*3/mm3      Monocytes, Absolute 1.14 10*3/mm3      Eosinophils, Absolute 0.17 10*3/mm3      Basophils, Absolute 0.10 10*3/mm3      Immature Grans, Absolute 0.17 10*3/mm3      nRBC 0.0 /100 WBC     D-dimer,  Quantitative [[id number]]  (Abnormal) Collected:  09/04/20 2316    Specimen:  Blood Updated:  09/05/20 0009     D-Dimer, Quantitative 2,592 ng/mL (FEU)     Narrative:       Dimer values <500 ng/ml FEU are FDA approved as aid in diagnosis of deep venous thrombosis and pulmonary embolism.  This test should not be used in an exclusion strategy with pretest probability alone.    A recent guideline regarding diagnosis for pulmonary thromboembolism recommends an adjusted exclusion criterion of age x 10 ng/ml FEU for patients >50 years of age (Cassi Intern Med 2015; 163: 701-711).      Comprehensive Metabolic Panel [[id number]]  (Abnormal) Collected:  09/04/20 2316    Specimen:  Blood Updated:  09/04/20 2357     Glucose 234 mg/dL      BUN 10 mg/dL      Creatinine 1.13 mg/dL      Sodium 138 mmol/L      Potassium 3.6 mmol/L      Comment: Slight hemolysis detected by analyzer. Results may be affected.        Chloride 95 mmol/L      CO2 18.0 mmol/L      Calcium 9.4 mg/dL      Total Protein 7.9 g/dL      Albumin 4.10 g/dL      ALT (SGPT) 22 U/L      AST (SGOT) 30 U/L      Comment: Slight hemolysis detected by analyzer. Results may be affected.        Alkaline Phosphatase 115 U/L      Total Bilirubin 0.5 mg/dL      eGFR Non African Amer 48 mL/min/1.73      Globulin 3.8 gm/dL      A/G Ratio 1.1 g/dL      BUN/Creatinine Ratio 8.8     Anion Gap 25.0 mmol/L     Narrative:       GFR Normal >60  Chronic Kidney Disease <60  Kidney Failure <15      Troponin [[id number]]  (Normal) Collected:  09/04/20 2316    Specimen:  Blood Updated:  09/04/20 2352     Troponin T <0.010 ng/mL     Narrative:       Troponin T Reference Range:  <= 0.03 ng/mL-   Negative for AMI  >0.03 ng/mL-     Abnormal for myocardial necrosis.  Clinicians would have to utilize clinical acumen, EKG, Troponin and serial changes to determine if it is an Acute Myocardial Infarction or myocardial injury due to an underlying chronic condition.       Results may be falsely decreased  if patient taking Biotin.      BNP [[id number]]  (Normal) Collected:  09/04/20 2316    Specimen:  Blood Updated:  09/04/20 2349     proBNP 146.8 pg/mL     Narrative:       Among patients with dyspnea, NT-proBNP is highly sensitive for the detection of acute congestive heart failure. In addition NT-proBNP of <300 pg/ml effectively rules out acute congestive heart failure with 99% negative predictive value.    Results may be falsely decreased if patient taking Biotin.          Imaging Results (Last 24 Hours)     Procedure Component Value Units Date/Time    CT Angiogram Chest [[id number]] Resulted:  09/05/20 0129     Updated:  09/05/20 0130    XR Chest 1 View [[id number]] Collected:  09/04/20 2310     Updated:  09/04/20 2328    Narrative:       PORTABLE CHEST X-RAY    CLINICAL HISTORY: CHF/COPD Protocol    COMPARISON: 6/20/2017.    FINDINGS: Portable AP view of the chest was obtained with  overlying monitor leads in place. ET tube terminates at the level  of the mid thoracic trachea. Nasogastric tube is coiled beneath  the left hemidiaphragm terminating in the region of the proximal  stomach. Lungs are poorly aerated. There are left greater than  right groundglass and interstitial opacities favored to be  related to edema rather than pneumonia. There are calcified  residua of granulomatous disease present. No significant pleural  fluid. Normal heart size.      Impression:       Life support lines as above, lungs are under aerated  with left greater than right pulmonary opacities as discussed      Electronically signed by:  Alfonso Medrano MD  9/4/2020 11:27 PM  CDT Workstation: [id number]            Assessment:    Active Hospital Problems    Diagnosis   • Respiratory failure with hypoxia and hypercapnia (CMS/HCC)       Impression    1.  Acute hypoxic respiratory failure with hypercapnia requiring intubation  -Abnormal chest x-ray consistent with pulmonary process atypic bacterial versus viral process including rule out  COVID.  - History of breast CA and findings above certainly rule out pulmonary embolism and awaiting CTA  -If negative will trend d-dimer for COVID pending Covid screen  - Continue Rocephin and azithromycin at present time awaiting culture results  -Trend CBC and note this is predominantly lymphocyte cytosis    2.  History of breast CA  -Question history of metastatic  -Await CTA    3.  Elevated random blood sugar  -Check A1c  -Use sliding scale    4.  History of chronic pain on chronic narcotics followed by chronic pain     CODE STATUS presumably patient is a full code you have prophylaxis  -Currently high suspicion for COVID use Lovenox twice daily and may certainly need to add steroid waiting COVID testing you have discussed with ED nursing as well as ED physician please see admission orders                  Dagoberto Crum MD      Addendum  -CTA did not reveal evidence of pulmonary embolism however concerned with bilateral process and groundglass patient may have underlying COVID and await testing.  Patient's A1c returned at 5.9 consistent with impaired fasting glucose and noted to have fatty liver by CT scan.          Electronically signed by Dagoberto Crum MD at 09/05/20 0701          Emergency Department Notes      Raghav Lopez MD at 09/04/20 2312      Procedure Orders    1. Intubation [[id number]] ordered by Ragahv Lopez MD at 09/04/20 2318                Subjective   Patient presents emergency department with respiratory failure.  Patient was picked up in front of her apartment with sats in the 74%.  Patient arrives with saturations 71% on nonrebreather mask respirate of 40 in extremis.  Patient without history of prior COPD or CHF.  Patient is an everyday smoker.  Patient has known metastatic lung cancer.  Patient unable to give any type of history.          Review of Systems   Unable to perform ROS: Severe respiratory distress   Respiratory: Positive for shortness of breath.        Past  Medical History:   Diagnosis Date   • Acute bronchitis, unspecified    • Acute exacerbation of chronic bronchitis (CMS/HCC)    • Acute exacerbation of chronic bronchitis (CMS/HCC)    • Acute frontal sinusitis, unspecified    • Acute laryngitis    • Allergic rhinitis    • Anxiety    • Cancer (CMS/HCC)    • Chronic back pain    • Encounter for therapeutic drug monitoring    • Family history of polyps in the colon    • H/O bone density study 02/11/2014   • H/O mammogram 02/11/2014   • Hyperlipidemia    • Hypertension    • Malignant neoplasm of female breast (CMS/HCC)      L breast, sp mastectomy      • Osteopenia    • Panic disorder    • Right lower quadrant pain     rule out appendicitis          Allergies   Allergen Reactions   • Augmentin [Amoxicillin-Pot Clavulanate]    • Ciprofloxacin    • Codeine    • Eggs Or Egg-Derived Products    • Paxil [Paroxetine Hcl]        Past Surgical History:   Procedure Laterality Date   • INJECTION OF MEDICATION  05/26/2016    Ramon(3)   • MASTECTOMY  2003    left breast for cancer   • PAP SMEAR  01/23/2013    negative for malignancy       No family history on file.    Social History     Socioeconomic History   • Marital status: Legally      Spouse name: Not on file   • Number of children: Not on file   • Years of education: Not on file   • Highest education level: Not on file   Tobacco Use   • Smoking status: Current Every Day Smoker     Packs/day: 0.50   Substance and Sexual Activity   • Alcohol use: No   • Drug use: No           Objective   Physical Exam   Constitutional: She appears well-developed and well-nourished. She appears distressed.   HENT:   Head: Normocephalic and atraumatic.   Nose: Nose normal.   Mouth/Throat: Oropharynx is clear and moist.   Eyes: Conjunctivae and EOM are normal. No scleral icterus.   Neck: Normal range of motion. Neck supple. No JVD present.   Cardiovascular: Normal rate, regular rhythm, normal heart sounds and intact distal pulses. Exam  reveals no gallop and no friction rub.   No murmur heard.  Pulmonary/Chest: She is in respiratory distress. She has wheezes. She has rales. She exhibits no tenderness.   Severe respiratory distress with tachypnea and low oxygen saturations.  Patient is lungs are very tight with wheeze.  Is bilateral.  Unable to complete any type of sentence unable to give any type of history.  Patient appears altered.   Abdominal: Soft. She exhibits no distension and no mass. There is no tenderness. There is no rebound and no guarding.   Musculoskeletal: Normal range of motion. She exhibits no edema, tenderness or deformity.   Lymphadenopathy:     She has no cervical adenopathy.   Neurological: No cranial nerve deficit. She exhibits normal muscle tone.   Patient alert though somewhat somnolent.   Skin: Skin is warm and dry. Capillary refill takes less than 2 seconds. No rash noted. She is not diaphoretic. No erythema. No pallor.   Psychiatric: She has a normal mood and affect. Her behavior is normal. Judgment and thought content normal.   Nursing note and vitals reviewed.      Intubation  Date/Time: 9/4/2020 10:18 PM  Performed by: Raghav Lopez MD  Authorized by: Raghav Lopez MD     Consent:     Consent obtained:  Verbal    Consent given by:  Patient    Alternatives discussed:  No treatment  Pre-procedure details:     Patient status:  Altered mental status    Paralytics:  Succinylcholine  Procedure details:     Preoxygenation:  None    CPR in progress: no      Intubation method:  Oral    Laryngoscope blade:  Mac 4    Tube size (mm):  7.5    Tube type:  Cuffed    Number of attempts:  1    Ventilation between attempts: no      Cricoid pressure: no      Tube visualized through cords: no    Placement assessment:     ETT to lip:  22    ETT to teeth:  22    Tube secured with:  ETT roper    Breath sounds:  Equal    Placement verification: chest rise, condensation, CXR verification, direct visualization, equal breath sounds,  ETCO2 detector and tube exhalation      CXR findings:  ETT in proper place  Post-procedure details:     Patient tolerance of procedure:  Tolerated well, no immediate complications              ED Course                                 Labs Reviewed   COMPREHENSIVE METABOLIC PANEL - Abnormal; Notable for the following components:       Result Value    Glucose 234 (*)     Creatinine 1.13 (*)     Chloride 95 (*)     CO2 18.0 (*)     eGFR Non  Amer 48 (*)     Anion Gap 25.0 (*)     All other components within normal limits    Narrative:     GFR Normal >60  Chronic Kidney Disease <60  Kidney Failure <15     CBC WITH AUTO DIFFERENTIAL - Abnormal; Notable for the following components:    WBC 15.90 (*)     RBC 5.31 (*)     Hemoglobin 16.2 (*)     Hematocrit 49.9 (*)     Neutrophil % 34.7 (*)     Lymphocyte % 55.3 (*)     Immature Grans % 1.1 (*)     Lymphocytes, Absolute 8.79 (*)     Monocytes, Absolute 1.14 (*)     Immature Grans, Absolute 0.17 (*)     All other components within normal limits   D-DIMER, QUANTITATIVE - Abnormal; Notable for the following components:    D-Dimer, Quantitative 2,592 (*)     All other components within normal limits    Narrative:     Dimer values <500 ng/ml FEU are FDA approved as aid in diagnosis of deep venous thrombosis and pulmonary embolism.  This test should not be used in an exclusion strategy with pretest probability alone.    A recent guideline regarding diagnosis for pulmonary thromboembolism recommends an adjusted exclusion criterion of age x 10 ng/ml FEU for patients >50 years of age (Cassi Intern Med 2015; 163: 701-711).     BLOOD GAS, ARTERIAL - Abnormal; Notable for the following components:    pH, Arterial 7.318 (*)     pCO2, Arterial 45.3 (*)     pO2, Arterial 176.0 (*)     Base Excess, Arterial -3.2 (*)     All other components within normal limits   BNP (IN-HOUSE) - Normal    Narrative:     Among patients with dyspnea, NT-proBNP is highly sensitive for the detection of  acute congestive heart failure. In addition NT-proBNP of <300 pg/ml effectively rules out acute congestive heart failure with 99% negative predictive value.    Results may be falsely decreased if patient taking Biotin.     TROPONIN (IN-HOUSE) - Normal    Narrative:     Troponin T Reference Range:  <= 0.03 ng/mL-   Negative for AMI  >0.03 ng/mL-     Abnormal for myocardial necrosis.  Clinicians would have to utilize clinical acumen, EKG, Troponin and serial changes to determine if it is an Acute Myocardial Infarction or myocardial injury due to an underlying chronic condition.       Results may be falsely decreased if patient taking Biotin.     BLOOD CULTURE   BLOOD CULTURE   COVID-19,[facility] IN-HOUSE, NP SWAB IN TRANSPORT MEDIA 8-10 HR TAT   RAINBOW DRAW    Narrative:     The following orders were created for panel order [place] Draw.  Procedure                               Abnormality         Status                     ---------                               -----------         ------                     Light Blue Top[[id number]]                                   Final result               Green Top (Gel)[[id number]]                                  Final result               Lavender Top[[id number]]                                     Final result               Gold Top - SST[[id number]]                                   Final result                 Please view results for these tests on the individual orders.   BLOOD GAS, ARTERIAL   LACTIC ACID, PLASMA   MANUAL DIFFERENTIAL   CBC AND DIFFERENTIAL    Narrative:     The following orders were created for panel order CBC & Differential.  Procedure                               Abnormality         Status                     ---------                               -----------         ------                     CBC Auto Differential[[id number]]        Abnormal            Final result                 Please view results for these tests on the individual orders.   LIGHT BLUE  TOP   GREEN TOP   LAVENDER TOP   GOLD TOP - SST       XR Chest 1 View   Final Result   Life support lines as above, lungs are under aerated   with left greater than right pulmonary opacities as discussed         Electronically signed by:  Alfonso Medrano MD  9/4/2020 11:27 PM   CDT Workstation: [id number]      CT Angiogram Chest    (Results Pending)     Lateral pneumonia with respiratory failure.  Patient does have elevated d-dimer with a history of cancer.  We will plan on getting a CTA when available.  Patient meantime is doing well on the vent with better oxygenation and PCO2 the 45 range.  Bilateral pneumonia was treated with antibiotics, waiting COVID-19 evaluation.          MDM    Final diagnoses:   Respiratory failure with hypoxia and hypercapnia, unspecified chronicity (CMS/HCC)   Pneumonia of both lungs due to infectious organism, unspecified part of lung   Elevated d-dimer            Raghav Lopez MD  09/05/20 0035      Electronically signed by Raghav Lopez MD at 09/05/20 0035       Ventilator/Non-Invasive Ventilation Settings (From admission, onward)     Start     Ordered    09/05/20 0312  Ventilator - AC/VC+; 20; 90%; 5; 500  Continuous     Question Answer Comment   Vent Mode AC/VC+    Breath rate 20    FiO2 titrate for Sp02% =/> 90%    PEEP 5    Tidal Volume 500        09/05/20 0313              Bhavana Patel RN  [facility]  [phone]  Fax [phone]

## 2020-09-05 NOTE — H&P
"      [facility] Medicine Admission      Date of Admission: 9/4/2020      Primary Care Physician: Pat Montiel APRN      Chief Complaint: No chief complaint patient came in in  extremis requiring intubation    HPI  History from chart only    Briefly this is a 64-year-old female who presented the ED carrying the following problem list from chart review    1.  History of tobacco use  2.  History of breast CA remote  3.  History of chronic pain previously on chronic narcotics referred to chronic pain clinic in 2015    Patient presented to the ED was noted to have O2 sats of 74% she was picked up from her apartment base of the same.  She was placed on a nonrebreather mask with no improvement and respiratory rate of 40 and intubated urgently.  ED physicians note states patient noted to have metastatic cancer unclear as to where this came from currently.  Patient reportedly had a past history of CHF or COPD.  Patient is chest x-ray revealed bilateral process as well as \"groundglass appearance.  Chronic white count was noted to be 16,000 with a lymphocyte predominance with a d-dimer of 2500 patient was started on broad-spectrum antibiotics were asked to admit for further evaluation and treatment as well as to rule out COVID.  Patient CTA is currently pending    Concurrent Medical History:  has a past medical history of Acute bronchitis, unspecified, Acute exacerbation of chronic bronchitis (CMS/HCC), Acute exacerbation of chronic bronchitis (CMS/HCC), Acute frontal sinusitis, unspecified, Acute laryngitis, Allergic rhinitis, Anxiety, Cancer (CMS/HCC), Chronic back pain, Encounter for therapeutic drug monitoring, Family history of polyps in the colon, H/O bone density study (02/11/2014), H/O mammogram (02/11/2014), Hyperlipidemia, Hypertension, Malignant neoplasm of female breast (CMS/HCC), Osteopenia, Panic disorder, and Right lower quadrant pain.    Past Surgical " History:  has a past surgical history that includes Mastectomy (2003); Pap Smear (01/23/2013); and Injection of Medication (05/26/2016).    Family History: family history is not on file.     Social History:  reports that she has been smoking. She has been smoking about 0.50 packs per day. She does not have any smokeless tobacco history on file. She reports that she does not drink alcohol or use drugs.    Allergies:   Allergies   Allergen Reactions   • Augmentin [Amoxicillin-Pot Clavulanate]    • Ciprofloxacin    • Codeine    • Eggs Or Egg-Derived Products    • Paxil [Paroxetine Hcl]        Medications:   Prior to Admission medications    Medication Sig Start Date End Date Taking? Authorizing Provider   HYDROcodone-acetaminophen (NORCO) 5-325 MG per tablet Take 1 tablet by mouth Every 8 (Eight) Hours As Needed for Moderate Pain (4-6). 6/20/17   Jacquelin Jack APRN   lisinopril (PRINIVIL,ZESTRIL) 10 MG tablet Take 1 tablet by mouth Daily. 6/20/17   Jacquelin Jack APRN       Review of Systems:  Review of Systems   Unable to perform ROS: Intubated      Otherwise complete ROS is negative except as mentioned above.    Physical Exam:   Temp:  [99.5 °F (37.5 °C)] 99.5 °F (37.5 °C)  Heart Rate:  [112-152] 112  Resp:  [20-40] 20  BP: (111-142)/(76-95) 118/80  FiO2 (%):  [90 %] 90 %  Physical Exam   Constitutional: She is oriented to person, place, and time. She appears well-developed and well-nourished.   HENT:   Head: Normocephalic and atraumatic.   Eyes: Pupils are equal, round, and reactive to light. EOM are normal.   Neck: Normal range of motion. Neck supple.   Cardiovascular: Normal rate and regular rhythm.   Pulmonary/Chest:   Rhonchus sounds   Musculoskeletal: Normal range of motion.   Neurological: She is alert and oriented to person, place, and time.   Skin: Skin is warm and dry. Capillary refill takes less than 2 seconds.   Psychiatric: She has a normal mood and affect.   Nursing note and vitals  reviewed.        Results Reviewed:  I have personally reviewed current lab, radiology, and data and agree with results.  Lab Results (last 24 hours)     Procedure Component Value Units Date/Time    Lactic Acid, Plasma [[id number]] Collected:  09/05/20 0046    Specimen:  Blood Updated:  09/05/20 0109    Blood Culture - Blood, Arm, Right [[id number]] Collected:  09/05/20 0046    Specimen:  Blood from Arm, Right Updated:  09/05/20 0109    COVID-19, [facility] IN-HOUSE, NP SWAB IN TRANSPORT MEDIA 8-10 HR TAT - Swab, Nasopharynx [[id number]] Collected:  09/05/20 0056    Specimen:  Swab from Nasopharynx Updated:  09/05/20 0109    [place] Draw [[id number]] Collected:  09/04/20 2316    Specimen:  Blood Updated:  09/05/20 0030    Narrative:       The following orders were created for panel order [place] Draw.  Procedure                               Abnormality         Status                     ---------                               -----------         ------                     Light Blue Top[[id number]]                                   Final result               Green Top (Gel)[[id number]]                                  Final result               Lavender Top[[id number]]                                     Final result               Gold Top - SST[[id number]]                                   Final result                 Please view results for these tests on the individual orders.    Light Blue Top [[id number]] Collected:  09/04/20 2316    Specimen:  Blood Updated:  09/05/20 0030     Extra Tube hold for add-on     Comment: Auto resulted       Green Top (Gel) [[id number]] Collected:  09/04/20 2316    Specimen:  Blood Updated:  09/05/20 0030     Extra Tube Hold for add-ons.     Comment: Auto resulted.       Lavender Top [[id number]] Collected:  09/04/20 2316    Specimen:  Blood Updated:  09/05/20 0030     Extra Tube hold for add-on     Comment: Auto resulted       Gold Top - SST [[id number]] Collected:  09/04/20 2316    Specimen:   Blood Updated:  09/05/20 0030     Extra Tube Hold for add-ons.     Comment: Auto resulted.       Blood Gas, Arterial [[id number]]  (Abnormal) Collected:  09/05/20 0008    Specimen:  Arterial Blood Updated:  09/05/20 0025     Site Left Radial     Husam's Test Positive     pH, Arterial 7.318 pH units      Comment: 84 Value below reference range        pCO2, Arterial 45.3 mm Hg      Comment: 83 Value above reference range        pO2, Arterial 176.0 mm Hg      Comment: 83 Value above reference range        HCO3, Arterial 23.2 mmol/L      Base Excess, Arterial -3.2 mmol/L      Comment: 84 Value below reference range        O2 Saturation, Arterial 98.9 %      Barometric Pressure for Blood Gas 754 mmHg      Modality Ventilator     FIO2 100 %      Ventilator Mode AC     Set Tidal Volume 550     Set Mech Resp Rate 20.0     PEEP 5.0     Collected by DALILA     Comment: Meter: [id number]     :  [id number]       Manual Differential [[id number]] Collected:  09/04/20 2316    Specimen:  Blood Updated:  09/05/20 0019    CBC & Differential [[id number]] Collected:  09/04/20 2316    Specimen:  Blood Updated:  09/05/20 0019    Narrative:       The following orders were created for panel order CBC & Differential.  Procedure                               Abnormality         Status                     ---------                               -----------         ------                     CBC Auto Differential[[id number]]        Abnormal            Final result                 Please view results for these tests on the individual orders.    CBC Auto Differential [[id number]]  (Abnormal) Collected:  09/04/20 2316    Specimen:  Blood Updated:  09/05/20 0019     WBC 15.90 10*3/mm3      RBC 5.31 10*6/mm3      Hemoglobin 16.2 g/dL      Hematocrit 49.9 %      MCV 94.0 fL      MCH 30.5 pg      MCHC 32.5 g/dL      RDW 12.7 %      RDW-SD 43.1 fl      MPV 10.9 fL      Platelets 363 10*3/mm3      Neutrophil % 34.7 %      Lymphocyte % 55.3 %       Monocyte % 7.2 %      Eosinophil % 1.1 %      Basophil % 0.6 %      Immature Grans % 1.1 %      Neutrophils, Absolute 5.53 10*3/mm3      Lymphocytes, Absolute 8.79 10*3/mm3      Monocytes, Absolute 1.14 10*3/mm3      Eosinophils, Absolute 0.17 10*3/mm3      Basophils, Absolute 0.10 10*3/mm3      Immature Grans, Absolute 0.17 10*3/mm3      nRBC 0.0 /100 WBC     D-dimer, Quantitative [[id number]]  (Abnormal) Collected:  09/04/20 2316    Specimen:  Blood Updated:  09/05/20 0009     D-Dimer, Quantitative 2,592 ng/mL (FEU)     Narrative:       Dimer values <500 ng/ml FEU are FDA approved as aid in diagnosis of deep venous thrombosis and pulmonary embolism.  This test should not be used in an exclusion strategy with pretest probability alone.    A recent guideline regarding diagnosis for pulmonary thromboembolism recommends an adjusted exclusion criterion of age x 10 ng/ml FEU for patients >50 years of age ([employer] Intern Med 2015; 163: 701-711).      Comprehensive Metabolic Panel [[id number]]  (Abnormal) Collected:  09/04/20 2316    Specimen:  Blood Updated:  09/04/20 2357     Glucose 234 mg/dL      BUN 10 mg/dL      Creatinine 1.13 mg/dL      Sodium 138 mmol/L      Potassium 3.6 mmol/L      Comment: Slight hemolysis detected by analyzer. Results may be affected.        Chloride 95 mmol/L      CO2 18.0 mmol/L      Calcium 9.4 mg/dL      Total Protein 7.9 g/dL      Albumin 4.10 g/dL      ALT (SGPT) 22 U/L      AST (SGOT) 30 U/L      Comment: Slight hemolysis detected by analyzer. Results may be affected.        Alkaline Phosphatase 115 U/L      Total Bilirubin 0.5 mg/dL      eGFR Non African Amer 48 mL/min/1.73      Globulin 3.8 gm/dL      A/G Ratio 1.1 g/dL      BUN/Creatinine Ratio 8.8     Anion Gap 25.0 mmol/L     Narrative:       GFR Normal >60  Chronic Kidney Disease <60  Kidney Failure <15      Troponin [[id number]]  (Normal) Collected:  09/04/20 2316    Specimen:  Blood Updated:  09/04/20 2352     Troponin T <0.010 ng/mL      Narrative:       Troponin T Reference Range:  <= 0.03 ng/mL-   Negative for AMI  >0.03 ng/mL-     Abnormal for myocardial necrosis.  Clinicians would have to utilize clinical acumen, EKG, Troponin and serial changes to determine if it is an Acute Myocardial Infarction or myocardial injury due to an underlying chronic condition.       Results may be falsely decreased if patient taking Biotin.      BNP [[id number]]  (Normal) Collected:  09/04/20 2316    Specimen:  Blood Updated:  09/04/20 2349     proBNP 146.8 pg/mL     Narrative:       Among patients with dyspnea, NT-proBNP is highly sensitive for the detection of acute congestive heart failure. In addition NT-proBNP of <300 pg/ml effectively rules out acute congestive heart failure with 99% negative predictive value.    Results may be falsely decreased if patient taking Biotin.          Imaging Results (Last 24 Hours)     Procedure Component Value Units Date/Time    CT Angiogram Chest [[id number]] Resulted:  09/05/20 0129     Updated:  09/05/20 0130    XR Chest 1 View [[id number]] Collected:  09/04/20 2310     Updated:  09/04/20 2328    Narrative:       PORTABLE CHEST X-RAY    CLINICAL HISTORY: CHF/COPD Protocol    COMPARISON: 6/20/2017.    FINDINGS: Portable AP view of the chest was obtained with  overlying monitor leads in place. ET tube terminates at the level  of the mid thoracic trachea. Nasogastric tube is coiled beneath  the left hemidiaphragm terminating in the region of the proximal  stomach. Lungs are poorly aerated. There are left greater than  right groundglass and interstitial opacities favored to be  related to edema rather than pneumonia. There are calcified  residua of granulomatous disease present. No significant pleural  fluid. Normal heart size.      Impression:       Life support lines as above, lungs are under aerated  with left greater than right pulmonary opacities as discussed      Electronically signed by:  Alfonso Medrano MD  9/4/2020 11:27  PM  CDT Workstation: [id number]            Assessment:    Active Hospital Problems    Diagnosis   • Respiratory failure with hypoxia and hypercapnia (CMS/HCC)       Impression    1.  Acute hypoxic respiratory failure with hypercapnia requiring intubation  -Abnormal chest x-ray consistent with pulmonary process atypic bacterial versus viral process including rule out COVID.  - History of breast CA and findings above certainly rule out pulmonary embolism and awaiting CTA  -If negative will trend d-dimer for COVID pending Covid screen  - Continue Rocephin and azithromycin at present time awaiting culture results  -Trend CBC and note this is predominantly lymphocyte cytosis    2.  History of breast CA  -Question history of metastatic  -Await CTA    3.  Elevated random blood sugar  -Check A1c  -Use sliding scale    4.  History of chronic pain on chronic narcotics followed by chronic pain     CODE STATUS presumably patient is a full code you have prophylaxis  -Currently high suspicion for COVID use Lovenox twice daily and may certainly need to add steroid waiting COVID testing you have discussed with ED nursing as well as ED physician please see admission orders                  Dagoberto Crum MD      Addendum  -CTA did not reveal evidence of pulmonary embolism however concerned with bilateral process and groundglass patient may have underlying COVID and await testing.  Patient's A1c returned at 5.9 consistent with impaired fasting glucose and noted to have fatty liver by CT scan.

## 2020-09-05 NOTE — PLAN OF CARE
Problem: Ventilation, Mechanical Invasive (Adult)  Goal: Signs and Symptoms of Listed Potential Problems Will be Absent, Minimized or Managed (Ventilation, Mechanical Invasive)  Outcome: Ongoing (interventions implemented as appropriate)  Flowsheets (Taken 9/5/2020 0345)  Problems Assessed (Mechanical Ventilation, Invasive): all  Problems Present (Mech Vent, Invasive): situational response     Problem: Ventilation, Mechanical Invasive (Adult)  Intervention: Prevent Airway Displacement/Mechanical Dysfunction  Flowsheets (Taken 9/5/2020 0345)  Airway Safety Measures: suction at bedside  Intervention: Prevent Airway-Related Skin/Tissue Breakdown  Flowsheets (Taken 9/5/2020 0345)  Device Skin Pressure Protection: absorbent pad utilized/changed; adhesive use limited; positioning supports utilized; pressure points protected; skin-to-device areas padded  Intervention: Prevent Ventilator-Induced Lung Injury  Flowsheets (Taken 9/5/2020 0345)  Lung Protection Measures: optimal PEEP applied; plateau/inspiratory pressure monitored; ventilator synchrony promoted; ventilator waveforms monitored  Intervention: Promote/Provide Early Rehabilitation Treatment/Mobility Program  Flowsheets (Taken 9/5/2020 0143 by Elma Angeles RN)  Activity Management: bedrest  Intervention: Support Psychosocial Response to Intubation/Mechanical Ventilation  Flowsheets (Taken 9/5/2020 0345)  Supportive Measures: positive reinforcement provided; relaxation techniques promoted  Communication Enhancement Strategies: one-step directions provided  Intervention: Promote Early Weaning/Extubation  Flowsheets (Taken 9/5/2020 0345)  Environmental Support: calm environment promoted; caregiver consistency promoted; environmental consistency promoted; distractions minimized  Sleep/Rest Enhancement: awakenings minimized; consistent schedule promoted; noise level reduced; relaxation techniques promoted  Intervention: Prevent Ventilator-Associated Pneumonia  (VAP)  Flowsheets (Taken 9/5/2020 0345)  Head of Bed (HOB): HOB elevated  Intervention: Optimize Oxygenation/Ventilation  Flowsheets (Taken 9/5/2020 0345)  Airway/Ventilation Management: airway patency maintained; calming measures promoted; pulmonary hygiene promoted   Pt tolerating current vent settings, no signs of distress noted at this time. Pt does not meet criteria for SBT, will continue to monitor.

## 2020-09-05 NOTE — PLAN OF CARE
Problem: Patient Care Overview  Goal: Plan of Care Review  Outcome: Ongoing (interventions implemented as appropriate)  Flowsheets (Taken 9/5/2020 1219)  Progress: no change  Plan of Care Reviewed With: other (see comments) (RN)  Outcome Summary: initial assessment  Note:   Extubate and resume PO intake as appropriate.  If extubated for an extended period consider nutrition support.      No pertinent past medical history <<----- Click to add NO pertinent Past Medical History

## 2020-09-05 NOTE — PLAN OF CARE
Upon arrival to CCU pt awake, confused and anxious. Pt reaching for ETT and lines. NV restraints applied for pt safety. Pt requires propofol and versed for sedation. Vital signs remain stable. Heart rate has went from ST to NSR since arrival from ER. Awaiting covid19 results.

## 2020-09-05 NOTE — ED PROVIDER NOTES
Subjective   Patient presents emergency department with respiratory failure.  Patient was picked up in front of her apartment with sats in the 74%.  Patient arrives with saturations 71% on nonrebreather mask respirate of 40 in extremis.  Patient without history of prior COPD or CHF.  Patient is an everyday smoker.  Patient has known metastatic lung cancer.  Patient unable to give any type of history.          Review of Systems   Unable to perform ROS: Severe respiratory distress   Respiratory: Positive for shortness of breath.        Past Medical History:   Diagnosis Date   • Acute bronchitis, unspecified    • Acute exacerbation of chronic bronchitis (CMS/HCC)    • Acute exacerbation of chronic bronchitis (CMS/HCC)    • Acute frontal sinusitis, unspecified    • Acute laryngitis    • Allergic rhinitis    • Anxiety    • Cancer (CMS/HCC)    • Chronic back pain    • Encounter for therapeutic drug monitoring    • Family history of polyps in the colon    • H/O bone density study 02/11/2014   • H/O mammogram 02/11/2014   • Hyperlipidemia    • Hypertension    • Malignant neoplasm of female breast (CMS/HCC)      L breast, sp mastectomy      • Osteopenia    • Panic disorder    • Right lower quadrant pain     rule out appendicitis          Allergies   Allergen Reactions   • Augmentin [Amoxicillin-Pot Clavulanate]    • Ciprofloxacin    • Codeine    • Eggs Or Egg-Derived Products    • Paxil [Paroxetine Hcl]        Past Surgical History:   Procedure Laterality Date   • INJECTION OF MEDICATION  05/26/2016    Ramon(3)   • MASTECTOMY  2003    left breast for cancer   • PAP SMEAR  01/23/2013    negative for malignancy       No family history on file.    Social History     Socioeconomic History   • Marital status: Legally      Spouse name: Not on file   • Number of children: Not on file   • Years of education: Not on file   • Highest education level: Not on file   Tobacco Use   • Smoking status: Current Every Day Smoker      Packs/day: 0.50   Substance and Sexual Activity   • Alcohol use: No   • Drug use: No           Objective   Physical Exam   Constitutional: She appears well-developed and well-nourished. She appears distressed.   HENT:   Head: Normocephalic and atraumatic.   Nose: Nose normal.   Mouth/Throat: Oropharynx is clear and moist.   Eyes: Conjunctivae and EOM are normal. No scleral icterus.   Neck: Normal range of motion. Neck supple. No JVD present.   Cardiovascular: Normal rate, regular rhythm, normal heart sounds and intact distal pulses. Exam reveals no gallop and no friction rub.   No murmur heard.  Pulmonary/Chest: She is in respiratory distress. She has wheezes. She has rales. She exhibits no tenderness.   Severe respiratory distress with tachypnea and low oxygen saturations.  Patient is lungs are very tight with wheeze.  Is bilateral.  Unable to complete any type of sentence unable to give any type of history.  Patient appears altered.   Abdominal: Soft. She exhibits no distension and no mass. There is no tenderness. There is no rebound and no guarding.   Musculoskeletal: Normal range of motion. She exhibits no edema, tenderness or deformity.   Lymphadenopathy:     She has no cervical adenopathy.   Neurological: No cranial nerve deficit. She exhibits normal muscle tone.   Patient alert though somewhat somnolent.   Skin: Skin is warm and dry. Capillary refill takes less than 2 seconds. No rash noted. She is not diaphoretic. No erythema. No pallor.   Psychiatric: She has a normal mood and affect. Her behavior is normal. Judgment and thought content normal.   Nursing note and vitals reviewed.      Intubation  Date/Time: 9/4/2020 10:18 PM  Performed by: Raghav Lopez MD  Authorized by: Raghav Lopez MD     Consent:     Consent obtained:  Verbal    Consent given by:  Patient    Alternatives discussed:  No treatment  Pre-procedure details:     Patient status:  Altered mental status    Paralytics:   Succinylcholine  Procedure details:     Preoxygenation:  None    CPR in progress: no      Intubation method:  Oral    Laryngoscope blade:  Mac 4    Tube size (mm):  7.5    Tube type:  Cuffed    Number of attempts:  1    Ventilation between attempts: no      Cricoid pressure: no      Tube visualized through cords: no    Placement assessment:     ETT to lip:  22    ETT to teeth:  22    Tube secured with:  ETT roper    Breath sounds:  Equal    Placement verification: chest rise, condensation, CXR verification, direct visualization, equal breath sounds, ETCO2 detector and tube exhalation      CXR findings:  ETT in proper place  Post-procedure details:     Patient tolerance of procedure:  Tolerated well, no immediate complications               ED Course                                 Labs Reviewed   COMPREHENSIVE METABOLIC PANEL - Abnormal; Notable for the following components:       Result Value    Glucose 234 (*)     Creatinine 1.13 (*)     Chloride 95 (*)     CO2 18.0 (*)     eGFR Non  Amer 48 (*)     Anion Gap 25.0 (*)     All other components within normal limits    Narrative:     GFR Normal >60  Chronic Kidney Disease <60  Kidney Failure <15     CBC WITH AUTO DIFFERENTIAL - Abnormal; Notable for the following components:    WBC 15.90 (*)     RBC 5.31 (*)     Hemoglobin 16.2 (*)     Hematocrit 49.9 (*)     Neutrophil % 34.7 (*)     Lymphocyte % 55.3 (*)     Immature Grans % 1.1 (*)     Lymphocytes, Absolute 8.79 (*)     Monocytes, Absolute 1.14 (*)     Immature Grans, Absolute 0.17 (*)     All other components within normal limits   D-DIMER, QUANTITATIVE - Abnormal; Notable for the following components:    D-Dimer, Quantitative 2,592 (*)     All other components within normal limits    Narrative:     Dimer values <500 ng/ml FEU are FDA approved as aid in diagnosis of deep venous thrombosis and pulmonary embolism.  This test should not be used in an exclusion strategy with pretest probability alone.    A  recent guideline regarding diagnosis for pulmonary thromboembolism recommends an adjusted exclusion criterion of age x 10 ng/ml FEU for patients >50 years of age ([employer] Intern Med 2015; 163: 701-711).     BLOOD GAS, ARTERIAL - Abnormal; Notable for the following components:    pH, Arterial 7.318 (*)     pCO2, Arterial 45.3 (*)     pO2, Arterial 176.0 (*)     Base Excess, Arterial -3.2 (*)     All other components within normal limits   BNP (IN-HOUSE) - Normal    Narrative:     Among patients with dyspnea, NT-proBNP is highly sensitive for the detection of acute congestive heart failure. In addition NT-proBNP of <300 pg/ml effectively rules out acute congestive heart failure with 99% negative predictive value.    Results may be falsely decreased if patient taking Biotin.     TROPONIN (IN-HOUSE) - Normal    Narrative:     Troponin T Reference Range:  <= 0.03 ng/mL-   Negative for AMI  >0.03 ng/mL-     Abnormal for myocardial necrosis.  Clinicians would have to utilize clinical acumen, EKG, Troponin and serial changes to determine if it is an Acute Myocardial Infarction or myocardial injury due to an underlying chronic condition.       Results may be falsely decreased if patient taking Biotin.     BLOOD CULTURE   BLOOD CULTURE   COVID-19,[facility] IN-HOUSE, NP SWAB IN TRANSPORT MEDIA 8-10 HR TAT   RAINBOW DRAW    Narrative:     The following orders were created for panel order [place] Draw.  Procedure                               Abnormality         Status                     ---------                               -----------         ------                     Light Blue Top[[id number]]                                   Final result               Green Top (Gel)[[id number]]                                  Final result               Lavender Top[[id number]]                                     Final result               Gold Top - SST[[id number]]                                   Final result                 Please view  results for these tests on the individual orders.   BLOOD GAS, ARTERIAL   LACTIC ACID, PLASMA   MANUAL DIFFERENTIAL   CBC AND DIFFERENTIAL    Narrative:     The following orders were created for panel order CBC & Differential.  Procedure                               Abnormality         Status                     ---------                               -----------         ------                     CBC Auto Differential[[id number]]        Abnormal            Final result                 Please view results for these tests on the individual orders.   LIGHT BLUE TOP   GREEN TOP   LAVENDER TOP   GOLD TOP - SST       XR Chest 1 View   Final Result   Life support lines as above, lungs are under aerated   with left greater than right pulmonary opacities as discussed         Electronically signed by:  Alfonso Medrano MD  9/4/2020 11:27 PM   CDT Workstation: [id number]      CT Angiogram Chest    (Results Pending)     Lateral pneumonia with respiratory failure.  Patient does have elevated d-dimer with a history of cancer.  We will plan on getting a CTA when available.  Patient meantime is doing well on the vent with better oxygenation and PCO2 the 45 range.  Bilateral pneumonia was treated with antibiotics, waiting COVID-19 evaluation.          MDM    Final diagnoses:   Respiratory failure with hypoxia and hypercapnia, unspecified chronicity (CMS/HCC)   Pneumonia of both lungs due to infectious organism, unspecified part of lung   Elevated d-dimer            Raghav Lopez MD  09/05/20 0035

## 2020-09-05 NOTE — CONSULTS
Adult Nutrition  Assessment    Patient Name:  Theresa Esquivel  YOB: 1955  MRN: [id number]  Admit Date:  9/4/2020    Assessment Date:  9/5/2020    Comments:  Pt admitted with dx Respiratory Failure with hypoxia and hypercapnia.  Pt is COVID R/O.  Pt currently intubated and NPO.  RN indicates pt is free of GI distress.  Blood glucose, HgbA1C are elevated.  Sliding scale novolog prescribed.  Pt is receiving Diprivan at 10cc/hr which is providing ~ 264 mracos.day.  Pt with problem chewing/swallowing per Nutrition Screen.  Pt will probably need mechanically altered diet when extubated and PO intake is resumed.  If pt remains intubated for an extended period recommend tube feeding of Diabetisource AC with goal rate of 45cc/hr.  Will monitor and make further recommendations as appropriate.    Reason for Assessment     Row Name 09/05/20 1148          Reason for Assessment    Reason For Assessment  physician consult;identified at risk by screening criteria     Identified At Risk by Screening Criteria  MST SCORE 2+;difficulty chewing/swallowing           Anthropometrics     Row Name 09/05/20 0532          Anthropometrics    Weight  80.7 kg (177 lb 14.6 oz)         Labs/Tests/Procedures/Meds     Row Name 09/05/20 1148          Labs/Procedures/Meds    Lab Results Reviewed  reviewed, pertinent        Medications    Pertinent Medications Reviewed  reviewed, pertinent         Physical Findings     Row Name 09/05/20 1149          Physical Findings    Tubes  nasogastric tube;orogastric tube         Estimated/Assessed Needs     Row Name 09/05/20 1150          Calculation Measurements    Weight Used For Calculations  61 kg (134 lb 7.7 oz)        Estimated/Assessed Needs    Additional Documentation  Calorie Requirements (Group);Fluid Requirements (Group);[place]-St. Jeor Equation (Group);Protein Requirements (Group)        Calorie Requirements    Estimated Calorie Requirement (kcal/day)  1488     Estimated Calorie Need  Method  Strang-St Jeor        Strang-St. Jeor Equation    RMR (Strang-St. Jeor Equation)  1145        Protein Requirements    Weight Used For Protein Calculations  61 kg (134 lb 7.7 oz)     Est Protein Requirement Amount (gms/kg)  1.2 gm protein     Estimated Protein Requirements (gms/day)  73.2        Fluid Requirements    Estimated Fluid Requirements (mL/day)  1830     RDA Method (mL)  1830         Nutrition Prescription Ordered     Row Name 09/05/20 1202          Nutrition Prescription PO    Current PO Diet  NPO                 Electronically signed by:  Mami Street RD  09/05/20 12:03

## 2020-09-06 ENCOUNTER — APPOINTMENT (OUTPATIENT)
Dept: GENERAL RADIOLOGY | Facility: HOSPITAL | Age: 65
End: 2020-09-06

## 2020-09-06 LAB
ANION GAP SERPL CALCULATED.3IONS-SCNC: 12 MMOL/L (ref 5–15)
BASOPHILS # BLD AUTO: 0.03 10*3/MM3 (ref 0–0.2)
BASOPHILS NFR BLD AUTO: 0.2 % (ref 0–1.5)
BUN SERPL-MCNC: 15 MG/DL (ref 8–23)
BUN/CREAT SERPL: 17.2 (ref 7–25)
CALCIUM SPEC-SCNC: 9.3 MG/DL (ref 8.6–10.5)
CHLORIDE SERPL-SCNC: 106 MMOL/L (ref 98–107)
CO2 SERPL-SCNC: 23 MMOL/L (ref 22–29)
CREAT SERPL-MCNC: 0.87 MG/DL (ref 0.57–1)
D-LACTATE SERPL-SCNC: 2.5 MMOL/L (ref 0.5–2)
DEPRECATED RDW RBC AUTO: 43.1 FL (ref 37–54)
EOSINOPHIL # BLD AUTO: 0 10*3/MM3 (ref 0–0.4)
EOSINOPHIL NFR BLD AUTO: 0 % (ref 0.3–6.2)
ERYTHROCYTE [DISTWIDTH] IN BLOOD BY AUTOMATED COUNT: 12.9 % (ref 12.3–15.4)
GFR SERPL CREATININE-BSD FRML MDRD: 66 ML/MIN/1.73
GLUCOSE BLDC GLUCOMTR-MCNC: 114 MG/DL (ref 70–130)
GLUCOSE BLDC GLUCOMTR-MCNC: 131 MG/DL (ref 70–130)
GLUCOSE BLDC GLUCOMTR-MCNC: 140 MG/DL (ref 70–130)
GLUCOSE SERPL-MCNC: 141 MG/DL (ref 65–99)
HCT VFR BLD AUTO: 46.8 % (ref 34–46.6)
HGB BLD-MCNC: 15.6 G/DL (ref 12–15.9)
IMM GRANULOCYTES # BLD AUTO: 0.09 10*3/MM3 (ref 0–0.05)
IMM GRANULOCYTES NFR BLD AUTO: 0.5 % (ref 0–0.5)
L PNEUMO1 AG UR QL IA: NEGATIVE
LYMPHOCYTES # BLD AUTO: 1.45 10*3/MM3 (ref 0.7–3.1)
LYMPHOCYTES NFR BLD AUTO: 8.1 % (ref 19.6–45.3)
MAGNESIUM SERPL-MCNC: 2.2 MG/DL (ref 1.6–2.4)
MCH RBC QN AUTO: 30.5 PG (ref 26.6–33)
MCHC RBC AUTO-ENTMCNC: 33.3 G/DL (ref 31.5–35.7)
MCV RBC AUTO: 91.4 FL (ref 79–97)
MONOCYTES # BLD AUTO: 0.89 10*3/MM3 (ref 0.1–0.9)
MONOCYTES NFR BLD AUTO: 5 % (ref 5–12)
NEUTROPHILS NFR BLD AUTO: 15.43 10*3/MM3 (ref 1.7–7)
NEUTROPHILS NFR BLD AUTO: 86.2 % (ref 42.7–76)
NRBC BLD AUTO-RTO: 0 /100 WBC (ref 0–0.2)
PHOSPHATE SERPL-MCNC: 3.4 MG/DL (ref 2.5–4.5)
PLATELET # BLD AUTO: 296 10*3/MM3 (ref 140–450)
PMV BLD AUTO: 10.6 FL (ref 6–12)
POTASSIUM SERPL-SCNC: 4.1 MMOL/L (ref 3.5–5.2)
PROCALCITONIN SERPL-MCNC: 0.97 NG/ML (ref 0–0.25)
RBC # BLD AUTO: 5.12 10*6/MM3 (ref 3.77–5.28)
S PNEUM AG SPEC QL LA: NEGATIVE
SODIUM SERPL-SCNC: 141 MMOL/L (ref 136–145)
WBC # BLD AUTO: 17.89 10*3/MM3 (ref 3.4–10.8)

## 2020-09-06 PROCEDURE — 94799 UNLISTED PULMONARY SVC/PX: CPT

## 2020-09-06 PROCEDURE — 25010000002 PROPOFOL 10 MG/ML EMULSION: Performed by: EMERGENCY MEDICINE

## 2020-09-06 PROCEDURE — 25010000002 ENOXAPARIN PER 10 MG: Performed by: INTERNAL MEDICINE

## 2020-09-06 PROCEDURE — 94640 AIRWAY INHALATION TREATMENT: CPT

## 2020-09-06 PROCEDURE — 85025 COMPLETE CBC W/AUTO DIFF WBC: CPT | Performed by: INTERNAL MEDICINE

## 2020-09-06 PROCEDURE — 87899 AGENT NOS ASSAY W/OPTIC: CPT | Performed by: INTERNAL MEDICINE

## 2020-09-06 PROCEDURE — 25010000002 FUROSEMIDE PER 20 MG: Performed by: INTERNAL MEDICINE

## 2020-09-06 PROCEDURE — 25010000002 AZITHROMYCIN PER 500 MG: Performed by: INTERNAL MEDICINE

## 2020-09-06 PROCEDURE — 94003 VENT MGMT INPAT SUBQ DAY: CPT

## 2020-09-06 PROCEDURE — 83605 ASSAY OF LACTIC ACID: CPT | Performed by: INTERNAL MEDICINE

## 2020-09-06 PROCEDURE — 25010000002 CEFTRIAXONE PER 250 MG: Performed by: INTERNAL MEDICINE

## 2020-09-06 PROCEDURE — 83735 ASSAY OF MAGNESIUM: CPT | Performed by: INTERNAL MEDICINE

## 2020-09-06 PROCEDURE — 84100 ASSAY OF PHOSPHORUS: CPT | Performed by: INTERNAL MEDICINE

## 2020-09-06 PROCEDURE — 99291 CRITICAL CARE FIRST HOUR: CPT | Performed by: INTERNAL MEDICINE

## 2020-09-06 PROCEDURE — 80048 BASIC METABOLIC PNL TOTAL CA: CPT | Performed by: INTERNAL MEDICINE

## 2020-09-06 PROCEDURE — 74018 RADEX ABDOMEN 1 VIEW: CPT

## 2020-09-06 PROCEDURE — 84145 PROCALCITONIN (PCT): CPT | Performed by: INTERNAL MEDICINE

## 2020-09-06 PROCEDURE — 25010000002 MIDAZOLAM 50 MG/10ML SOLUTION: Performed by: INTERNAL MEDICINE

## 2020-09-06 PROCEDURE — 82962 GLUCOSE BLOOD TEST: CPT

## 2020-09-06 RX ORDER — HYDRALAZINE HYDROCHLORIDE 20 MG/ML
10 INJECTION INTRAMUSCULAR; INTRAVENOUS EVERY 6 HOURS PRN
Status: DISCONTINUED | OUTPATIENT
Start: 2020-09-06 | End: 2020-09-06

## 2020-09-06 RX ORDER — FENTANYL CITRATE 50 UG/ML
25 INJECTION, SOLUTION INTRAMUSCULAR; INTRAVENOUS
Status: DISCONTINUED | OUTPATIENT
Start: 2020-09-06 | End: 2020-09-06

## 2020-09-06 RX ORDER — FENTANYL CITRATE 50 UG/ML
25 INJECTION, SOLUTION INTRAMUSCULAR; INTRAVENOUS
Status: DISCONTINUED | OUTPATIENT
Start: 2020-09-06 | End: 2020-09-07

## 2020-09-06 RX ORDER — ALBUTEROL SULFATE 90 UG/1
2 AEROSOL, METERED RESPIRATORY (INHALATION)
Status: DISCONTINUED | OUTPATIENT
Start: 2020-09-06 | End: 2020-09-09

## 2020-09-06 RX ORDER — ENALAPRILAT 2.5 MG/2ML
1.25 INJECTION INTRAVENOUS EVERY 6 HOURS PRN
Status: DISCONTINUED | OUTPATIENT
Start: 2020-09-06 | End: 2020-09-12 | Stop reason: HOSPADM

## 2020-09-06 RX ORDER — LISINOPRIL 10 MG/1
10 TABLET ORAL DAILY
Status: DISCONTINUED | OUTPATIENT
Start: 2020-09-06 | End: 2020-09-10

## 2020-09-06 RX ORDER — FUROSEMIDE 10 MG/ML
40 INJECTION INTRAMUSCULAR; INTRAVENOUS ONCE
Status: COMPLETED | OUTPATIENT
Start: 2020-09-06 | End: 2020-09-06

## 2020-09-06 RX ORDER — SODIUM CHLORIDE 9 MG/ML
50 INJECTION, SOLUTION INTRAVENOUS CONTINUOUS
Status: DISCONTINUED | OUTPATIENT
Start: 2020-09-06 | End: 2020-09-07

## 2020-09-06 RX ORDER — BUDESONIDE AND FORMOTEROL FUMARATE DIHYDRATE 160; 4.5 UG/1; UG/1
2 AEROSOL RESPIRATORY (INHALATION)
Status: DISCONTINUED | OUTPATIENT
Start: 2020-09-06 | End: 2020-09-09

## 2020-09-06 RX ADMIN — SODIUM CHLORIDE 75 ML/HR: 9 INJECTION, SOLUTION INTRAVENOUS at 09:12

## 2020-09-06 RX ADMIN — PROPOFOL 30 MCG/KG/MIN: 10 INJECTION, EMULSION INTRAVENOUS at 17:31

## 2020-09-06 RX ADMIN — SODIUM CHLORIDE, PRESERVATIVE FREE 10 ML: 5 INJECTION INTRAVENOUS at 20:46

## 2020-09-06 RX ADMIN — BUDESONIDE AND FORMOTEROL FUMARATE DIHYDRATE 2 PUFF: 160; 4.5 AEROSOL RESPIRATORY (INHALATION) at 11:17

## 2020-09-06 RX ADMIN — MIDAZOLAM HYDROCHLORIDE 5 MG/HR: 1 INJECTION, SOLUTION INTRAMUSCULAR; INTRAVENOUS at 00:32

## 2020-09-06 RX ADMIN — FUROSEMIDE 40 MG: 10 INJECTION, SOLUTION INTRAMUSCULAR; INTRAVENOUS at 10:53

## 2020-09-06 RX ADMIN — ALBUTEROL SULFATE 2 PUFF: 90 AEROSOL, METERED RESPIRATORY (INHALATION) at 19:49

## 2020-09-06 RX ADMIN — SODIUM CHLORIDE, PRESERVATIVE FREE 10 ML: 5 INJECTION INTRAVENOUS at 09:04

## 2020-09-06 RX ADMIN — PROPOFOL 30 MCG/KG/MIN: 10 INJECTION, EMULSION INTRAVENOUS at 07:50

## 2020-09-06 RX ADMIN — ENOXAPARIN SODIUM 40 MG: 40 INJECTION SUBCUTANEOUS at 09:02

## 2020-09-06 RX ADMIN — PROPOFOL 30 MCG/KG/MIN: 10 INJECTION, EMULSION INTRAVENOUS at 06:32

## 2020-09-06 RX ADMIN — ALBUTEROL SULFATE 2 PUFF: 90 AEROSOL, METERED RESPIRATORY (INHALATION) at 11:17

## 2020-09-06 RX ADMIN — CEFTRIAXONE 1 G: 1 INJECTION, POWDER, FOR SOLUTION INTRAMUSCULAR; INTRAVENOUS at 23:30

## 2020-09-06 RX ADMIN — ENOXAPARIN SODIUM 40 MG: 40 INJECTION SUBCUTANEOUS at 20:43

## 2020-09-06 RX ADMIN — LISINOPRIL 10 MG: 10 TABLET ORAL at 10:53

## 2020-09-06 RX ADMIN — FAMOTIDINE 20 MG: 10 INJECTION INTRAVENOUS at 09:02

## 2020-09-06 RX ADMIN — PROPOFOL 30 MCG/KG/MIN: 10 INJECTION, EMULSION INTRAVENOUS at 13:29

## 2020-09-06 RX ADMIN — BUDESONIDE AND FORMOTEROL FUMARATE DIHYDRATE 2 PUFF: 160; 4.5 AEROSOL RESPIRATORY (INHALATION) at 19:49

## 2020-09-06 NOTE — PLAN OF CARE
VSS. Patient did well all day.  Put propopol on hold to see if she would follow command and she failed.  She would not open her eyes and focus on anything and would not squeeze my hand and got very anxious and squirmed in the bed.  Put patient back on sedation.  Pulmonary and hospitalist discussed weaning versed off and giving fentayl for pain and if not tolerating may increase propopol.

## 2020-09-06 NOTE — PROGRESS NOTES
[facility] Medicine Services  INPATIENT PROGRESS NOTE    Length of Stay: 1  Date of Admission: 9/4/2020  Primary Care Physician: Pat Montiel APRN    Subjective   Chief Complaint: Respiratory failure.    HPI: Patient is seen for follow-up.  She is a 64-year-old female with history of nicotine dependence, chronic pain syndrome, chronic bronchitis, hypertension who was admitted for acute hypoxic/hypercapnic respiratory failure requiring endotracheal intubation.  She remains intubated, sedated and restrained.    Review of Systems  Unable to review as the patient is intubated and sedated.  Objective    Temp:  [97.7 °F (36.5 °C)-99.5 °F (37.5 °C)] 98.6 °F (37 °C)  Heart Rate:  [74-93] 78  Resp:  [14-20] 20  BP: (106-159)/() 132/82  FiO2 (%):  [35 %-50 %] 35 %    Vent Settings:  FiO2 (%):  [35 %-50 %] 35 %  S RR:  [20] 20  PEEP/CPAP (cm H2O):  [5 cm H20] 5 cm H20  NM SUP:  [0 cm H20] 0 cm H20  MAP (cm H2O):  [11-14] 11    Physical Exam   Constitutional: She appears well-developed and well-nourished. She is cooperative. No distress. She is sedated, intubated and restrained.   HENT:   Head: Normocephalic and atraumatic.   Right Ear: External ear normal.   Left Ear: External ear normal.   Nose: Nose normal.   Mouth/Throat: Oropharynx is clear and moist.   Eyes: Conjunctivae and EOM are normal.   Neck: Neck supple. No JVD present. No thyromegaly present.   Cardiovascular: Normal rate, regular rhythm, normal heart sounds and intact distal pulses. Exam reveals no gallop and no friction rub.   No murmur heard.  Pulmonary/Chest: Effort normal. No stridor. She is intubated. No respiratory distress. She has decreased breath sounds. She has no wheezes. She has rhonchi. She has no rales. She exhibits no tenderness.   Abdominal: Soft. Bowel sounds are normal. She exhibits no distension and no mass. There is no tenderness. There is no rebound and no guarding. No hernia.      Musculoskeletal: She exhibits no edema, tenderness or deformity.   Neurological: She has normal reflexes. She exhibits normal muscle tone.   Skin: Skin is warm and dry. No rash noted. She is not diaphoretic. No erythema. No pallor.   Nursing note and vitals reviewed.        Medication Review:    Current Facility-Administered Medications:   •  albuterol sulfate HFA (PROVENTIL HFA;VENTOLIN HFA;PROAIR HFA) inhaler 2 puff, 2 puff, Inhalation, 4x Daily - RT, Eulogio Gonzalez MD  •  AZITHROMYCIN 500 MG/250 ML 0.9% NS IVPB (vial-mate), 500 mg, Intravenous, Q24H, Dagoberto Crum MD, 500 mg at 09/05/20 2336  •  budesonide-formoterol (SYMBICORT) 160-4.5 MCG/ACT inhaler 2 puff, 2 puff, Inhalation, BID - RT, Eulogio Gonzalez MD  •  cefTRIAXone (ROCEPHIN) 1 g/100 mL 0.9% NS (MBP), 1 g, Intravenous, Q24H, Eulogio Gonzalez MD, 1 g at 09/05/20 2304  •  dextrose (D50W) 25 g/ 50mL Intravenous Solution 25 g, 25 g, Intravenous, Q15 Min PRN, Dagoberto Crum MD  •  dextrose (GLUTOSE) oral gel 15 g, 15 g, Oral, Q15 Min PRN, Dagoberto Crum MD  •  enalaprilat (VASOTEC) injection 1.25 mg, 1.25 mg, Intravenous, Q6H PRN, Eulogio Gonzalez MD  •  enoxaparin (LOVENOX) syringe 40 mg, 40 mg, Subcutaneous, Q12H, Dagoberto Crum MD, 40 mg at 09/06/20 0902  •  famotidine (PEPCID) injection 20 mg, 20 mg, Intravenous, Daily, Eulogio Gonzalez MD, 20 mg at 09/06/20 0902  •  fentaNYL citrate (PF) (SUBLIMAZE) injection 25 mcg, 25 mcg, Intravenous, Q1H PRN, Feng Valverde MD  •  furosemide (LASIX) injection 40 mg, 40 mg, Intravenous, Once, Feng Valverde MD  •  glucagon (human recombinant) (GLUCAGEN DIAGNOSTIC) injection 1 mg, 1 mg, Subcutaneous, Q15 Min PRN, Dagoberto Crum MD  •  insulin aspart (novoLOG) injection 0-7 Units, 0-7 Units, Subcutaneous, TID AC, Dagoberto Crum MD, Stopped at 09/06/20 0730  •  midazolam (VERSED) 50mg/100mL normal saline infusion, 1 mg/hr, Intravenous, Titrated, Skyla  Dagoberto NEWMAN MD, Last Rate: 6 mL/hr at 09/06/20 0918, 3 mg/hr at 09/06/20 0918  •  propofol (DIPRIVAN) infusion 10 mg/mL 100 mL, 5-50 mcg/kg/min, Intravenous, Titrated, Raghav Lopez MD, Last Rate: 13.2 mL/hr at 09/06/20 0903, 25 mcg/kg/min at 09/06/20 0903  •  sodium chloride 0.9 % flush 10 mL, 10 mL, Intravenous, PRN, Raghav Lopez MD, 10 mL at 09/05/20 2149  •  sodium chloride 0.9 % flush 10 mL, 10 mL, Intravenous, Q12H, Dagoberto Crum MD, 10 mL at 09/06/20 0904  •  sodium chloride 0.9 % flush 10 mL, 10 mL, Intravenous, PRN, Dagoberto Crum MD  •  sodium chloride 0.9 % infusion, 75 mL/hr, Intravenous, Continuous, Eulogio Gonzalez MD, Last Rate: 75 mL/hr at 09/06/20 0912, 75 mL/hr at 09/06/20 0912    Results Review:  I have reviewed the labs, radiology results, and diagnostic studies.    Laboratory Data:   Results from last 7 days   Lab Units 09/06/20 0239 09/05/20  0532 09/04/20  2316   SODIUM mmol/L 141 138 138   POTASSIUM mmol/L 4.1 3.5 3.6   CHLORIDE mmol/L 106 103 95*   CO2 mmol/L 23.0 23.0 18.0*   BUN mg/dL 15 15 10   CREATININE mg/dL 0.87 0.99 1.13*   GLUCOSE mg/dL 141* 180* 234*   CALCIUM mg/dL 9.3 9.2 9.4   BILIRUBIN mg/dL  --   --  0.5   ALK PHOS U/L  --   --  115   ALT (SGPT) U/L  --   --  22   AST (SGOT) U/L  --   --  30   ANION GAP mmol/L 12.0 12.0 25.0*     Estimated Creatinine Clearance: 67.1 mL/min (by C-G formula based on SCr of 0.87 mg/dL).  Results from last 7 days   Lab Units 09/06/20 0239 09/05/20  0532   MAGNESIUM mg/dL 2.2 2.1   PHOSPHORUS mg/dL 3.4 2.8         Results from last 7 days   Lab Units 09/06/20  0239 09/05/20  0532 09/04/20  2316   WBC 10*3/mm3 17.89* 11.68* 15.90*   HEMOGLOBIN g/dL 15.6 14.6 16.2*   HEMATOCRIT % 46.8* 43.2 49.9*   PLATELETS 10*3/mm3 296 297 363           Culture Data:   Blood Culture   Date Value Ref Range Status   09/05/2020 No growth at 24 hours  Preliminary   09/05/2020 No growth at 24 hours  Preliminary     No results found for: URINECX  No  results found for: RESPCX  No results found for: WOUNDCX  No results found for: STOOLCX  No components found for: BODYFLD    Radiology Data:   Imaging Results (Last 24 Hours)     ** No results found for the last 24 hours. **          ABG:  Results from last 7 days   Lab Units 09/05/20  0453   PH, ARTERIAL pH units 7.385   PO2 ART mm Hg 92.5   PCO2, ARTERIAL mm Hg 38.4   HCO3 ART mmol/L 23.0       I have reviewed the patient's current medications.     Assessment/Plan     Hospital Problem List:  Active Problems:    Respiratory failure with hypoxia and hypercapnia (CMS/HCC)    Acute respiratory failure with hypoxia and hypercapnia (secondary to bilateral pneumonia to rule out COVID-19 viral infection): Continue ventilatory support, IV antibiotics and inhalers.  Blood cultures, respiratory cultures showed no growth and COVID-19 PCR were negative x2.  Leukocytosis is reactive on the monitor.  Will repeat chest x-ray in a.m. and consult pulmonary.      Sepsis secondary to bilateral pneumonia (to rule out COVID-19 viral infection): Continue IV antibiotics.  Preliminary blood cultures show no growth.  Continue to trend CBC and lactic acid levels.     Azotemia: Resolved.    Elevated d-dimer is likely due to acute illness and CT pulmonary angiogram is negative for pulmonary embolism.    Hyperglycemia is likely due to acute illness as patient is not a known diabetic.  Hemoglobin A1c is 5.90.  Continue Accu-Cheks and sliding scale insulin.    Hypertension: Resume Lisinopril and make adjustments for optimal blood pressure control.      Nicotine dependence: Continue nicotine patch.  Patient will receive counseling postextubation.    Nutrition: Begin NG tube feeding today.  Dietitian is following.      Continue GI and DVT prophylaxis.    Update was given to Patient's daughter and next of kin - Connie Collins.       Discharge Planning: In progress.    Eulogio Gonzalez MD   09/06/20   10:07

## 2020-09-06 NOTE — CONSULTS
Group: [facility]         CONSULT NOTE    Patient Identification:    Theresa Esquivel  64 y.o.  female  1955  [id number]            Patient Care Team:  Pat Montiel APRN as PCP - General (Family Medicine)    Requesting physician:     Reason for Consultation:  AHRF, [facility] vent, Pna    History of Present Illness: Patient is a 64-year-old white female who has a past medical history significant for COPD with remote history of smoking, breast cancer status post left mastectomy, chronic pain syndrome on narcotics, osteoporosis who was admitted to the hospital on 9/5/2020 with acute respiratory failure and was intubated in the emergency room.  Patient's work-up showed bibasilar dense consolidations along with elevated white count concerning for bacterial pneumonia.  Patient has done well on the mechanical ventilator other than having issues with sedation and agitation on weaning down Versed but work-up so far has been unrevealing for source of pneumonia.  We have been asked to assist in the management of the patient.    During my evaluation patient is on Versed and propofol drip along with fentanyl pushes being started recently.  She has no family nurse at bedside.  Unable to interact with her.  History obtained by chart review and discussion with the primary service and the nurse    I have reviewed the H&P as well as the notes from the other consultants taking care of the patient this admission as well as previous hospital notes (if any available) from our group physicians and summarized above    Objective     Review of Systems:  Unable to obtain due to respiratory failure     Past Medical History:  Past Medical History:   Diagnosis Date   • Acute bronchitis, unspecified    • Acute exacerbation of chronic bronchitis (CMS/HCC)    • Acute exacerbation of chronic bronchitis (CMS/HCC)    • Acute frontal sinusitis, unspecified    • Acute laryngitis    • Allergic rhinitis    • Anxiety   "  • Cancer (CMS/HCC)    • Chronic back pain    • Encounter for therapeutic drug monitoring    • Family history of polyps in the colon    • H/O bone density study 02/11/2014   • H/O mammogram 02/11/2014   • Hyperlipidemia    • Hypertension    • Malignant neoplasm of female breast (CMS/HCC)      L breast, sp mastectomy      • Osteopenia    • Panic disorder    • Right lower quadrant pain     rule out appendicitis          Past Surgical History:  Past Surgical History:   Procedure Laterality Date   • INJECTION OF MEDICATION  05/26/2016    Ramon(3)   • MASTECTOMY  2003    left breast for cancer   • PAP SMEAR  01/23/2013    negative for malignancy        Home Meds:  Medications Prior to Admission   Medication Sig Dispense Refill Last Dose   • HYDROcodone-acetaminophen (NORCO) 5-325 MG per tablet Take 1 tablet by mouth Every 8 (Eight) Hours As Needed for Moderate Pain (4-6). 15 tablet 0    • lisinopril (PRINIVIL,ZESTRIL) 10 MG tablet Take 1 tablet by mouth Daily. 20 tablet 0        Allergies:  Allergies   Allergen Reactions   • Augmentin [Amoxicillin-Pot Clavulanate]    • Ciprofloxacin    • Codeine    • Eggs Or Egg-Derived Products    • Paxil [Paroxetine Hcl]        Social History:   Social History     Socioeconomic History   • Marital status: Legally      Spouse name: Not on file   • Number of children: Not on file   • Years of education: Not on file   • Highest education level: Not on file   Tobacco Use   • Smoking status: Current Every Day Smoker     Packs/day: 0.50   Substance and Sexual Activity   • Alcohol use: No   • Drug use: No       Family History:  No family history on file.    Physical Exam:  /82 (BP Location: Left arm, Patient Position: Lying)   Pulse 78   Temp 98.6 °F (37 °C) (Oral)   Resp 20   Ht 162.6 cm (64\")   Wt 80.8 kg (178 lb 2.1 oz)   SpO2 94%   BMI 30.58 kg/m²  Body mass index is 30.58 kg/m². 94% 80.8 kg (178 lb 2.1 oz)    Physical Exam     Constitutional: Middle aged pt in " bed, sedated on the mechanical ventilator  Head: - NCAT  Eyes: No pallor, Anicteric conjunctiva, EOMI.  ENMT:  ETT+, no oral thrush. Moist MM.   NECK: Trachea midline, No thyromegaly, no palpable cervical LNpathy  Heart: RRR, no murmur. No pedal edema   Lungs: ANGLE +, No wheezes/ crackles heard    Abdomen: Soft. No tenderness, guarding or rigidity. No palpable masses  Extremities: Extremities warm and well perfused. No cyanosis/ clubbing  Neuro: Sedated, no gross focal neuro deficits  Psych: Mood and affect UTO    LABS:  Lab Results   Component Value Date    CALCIUM 9.3 09/06/2020    PHOS 3.4 09/06/2020     Results from last 7 days   Lab Units 09/06/20  0239 09/05/20  0532 09/04/20  2316   MAGNESIUM mg/dL 2.2 2.1  --    SODIUM mmol/L 141 138 138   POTASSIUM mmol/L 4.1 3.5 3.6   CHLORIDE mmol/L 106 103 95*   CO2 mmol/L 23.0 23.0 18.0*   BUN mg/dL 15 15 10   CREATININE mg/dL 0.87 0.99 1.13*   GLUCOSE mg/dL 141* 180* 234*   CALCIUM mg/dL 9.3 9.2 9.4   WBC 10*3/mm3 17.89* 11.68* 15.90*   HEMOGLOBIN g/dL 15.6 14.6 16.2*   PLATELETS 10*3/mm3 296 297 363   ALT (SGPT) U/L  --   --  22   AST (SGOT) U/L  --   --  30   PROBNP pg/mL  --   --  146.8     Lab Results   Component Value Date    CKTOTAL 65 06/20/2017    CKMB 0.50 06/20/2017    TROPONINI <0.012 06/20/2017    TROPONINT <0.010 09/04/2020     Results from last 7 days   Lab Units 09/05/20  0531 09/05/20  0046   BLOODCX  No growth at 24 hours No growth at 24 hours     Results from last 7 days   Lab Units 09/06/20  0239 09/05/20  0532 09/05/20  0046   LACTATE mmol/L 2.5* 2.9* 3.3*             Results from last 7 days   Lab Units 09/05/20  0453 09/05/20  0008   PH, ARTERIAL pH units 7.385 7.318*   PO2 ART mm Hg 92.5 176.0*   PCO2, ARTERIAL mm Hg 38.4 45.3*   HCO3 ART mmol/L 23.0 23.2         Imaging: I personally visualized the images of chest scans/ x-rays performed within last 3 days and summarized below.    Assessment   Acute hypoxic/hypercapnic respiratory failure s/p  mechanical ventilation  Bibasilar pneumonia  Sepsis  COVID-19 negative x2  Mild lactic acidosis  Remote breast cancer  Chronic pain syndrome on narcotics  Remote tobacco abuse  H/o CHF    RECOMMENDATIONS:  Work-up done so far reviewed and agree with the current plan.  We will continue with modifying sedation and adding pain medication to control her agitation and consider spontaneous breathing trial.  Work-up with regards to pneumonia noted and we will add further testing to back off antibiotics.  We will also add procalcitonin  We will add 1 dose of diuretic given current history of CHF and to keep her dry  COVID-19 pneumonia has been ruled out with testing x2  Full code  DVT/GI prophylaxis    CC-32 minutes    Thank you for letting me participate in the care of this pleasant patient.  I have discussed my findings and recommendations with nursing staff and primary care team.     Feng Valverde MD  9/6/2020  09:52

## 2020-09-07 ENCOUNTER — APPOINTMENT (OUTPATIENT)
Dept: GENERAL RADIOLOGY | Facility: HOSPITAL | Age: 65
End: 2020-09-07

## 2020-09-07 ENCOUNTER — ANESTHESIA (OUTPATIENT)
Dept: ICU | Facility: HOSPITAL | Age: 65
End: 2020-09-07

## 2020-09-07 ENCOUNTER — ANESTHESIA EVENT (OUTPATIENT)
Dept: ICU | Facility: HOSPITAL | Age: 65
End: 2020-09-07

## 2020-09-07 LAB
AMPHET+METHAMPHET UR QL: NEGATIVE
AMPHETAMINES UR QL: NEGATIVE
ANION GAP SERPL CALCULATED.3IONS-SCNC: 9 MMOL/L (ref 5–15)
BACTERIA SPEC RESP CULT: NORMAL
BARBITURATES UR QL SCN: NEGATIVE
BASOPHILS # BLD AUTO: 0.02 10*3/MM3 (ref 0–0.2)
BASOPHILS NFR BLD AUTO: 0.2 % (ref 0–1.5)
BENZODIAZ UR QL SCN: NEGATIVE
BUN SERPL-MCNC: 21 MG/DL (ref 8–23)
BUN/CREAT SERPL: 25.6 (ref 7–25)
BUPRENORPHINE SERPL-MCNC: NEGATIVE NG/ML
CALCIUM SPEC-SCNC: 8.3 MG/DL (ref 8.6–10.5)
CANNABINOIDS SERPL QL: NEGATIVE
CHLORIDE SERPL-SCNC: 105 MMOL/L (ref 98–107)
CO2 SERPL-SCNC: 24 MMOL/L (ref 22–29)
COCAINE UR QL: NEGATIVE
CREAT SERPL-MCNC: 0.82 MG/DL (ref 0.57–1)
D-LACTATE SERPL-SCNC: 1.2 MMOL/L (ref 0.5–2)
DEPRECATED RDW RBC AUTO: 42.7 FL (ref 37–54)
EOSINOPHIL # BLD AUTO: 0.02 10*3/MM3 (ref 0–0.4)
EOSINOPHIL NFR BLD AUTO: 0.2 % (ref 0.3–6.2)
ERYTHROCYTE [DISTWIDTH] IN BLOOD BY AUTOMATED COUNT: 13.1 % (ref 12.3–15.4)
GFR SERPL CREATININE-BSD FRML MDRD: 70 ML/MIN/1.73
GLUCOSE BLDC GLUCOMTR-MCNC: 132 MG/DL (ref 70–130)
GLUCOSE BLDC GLUCOMTR-MCNC: 138 MG/DL (ref 70–130)
GLUCOSE BLDC GLUCOMTR-MCNC: 159 MG/DL (ref 70–130)
GLUCOSE SERPL-MCNC: 109 MG/DL (ref 65–99)
GRAM STN SPEC: NORMAL
HCT VFR BLD AUTO: 39.6 % (ref 34–46.6)
HGB BLD-MCNC: 13.6 G/DL (ref 12–15.9)
IMM GRANULOCYTES # BLD AUTO: 0.03 10*3/MM3 (ref 0–0.05)
IMM GRANULOCYTES NFR BLD AUTO: 0.3 % (ref 0–0.5)
LYMPHOCYTES # BLD AUTO: 3.86 10*3/MM3 (ref 0.7–3.1)
LYMPHOCYTES NFR BLD AUTO: 35.5 % (ref 19.6–45.3)
MAGNESIUM SERPL-MCNC: 1.9 MG/DL (ref 1.6–2.4)
MCH RBC QN AUTO: 30.8 PG (ref 26.6–33)
MCHC RBC AUTO-ENTMCNC: 34.3 G/DL (ref 31.5–35.7)
MCV RBC AUTO: 89.6 FL (ref 79–97)
METHADONE UR QL SCN: NEGATIVE
MONOCYTES # BLD AUTO: 0.64 10*3/MM3 (ref 0.1–0.9)
MONOCYTES NFR BLD AUTO: 5.9 % (ref 5–12)
NEUTROPHILS NFR BLD AUTO: 57.9 % (ref 42.7–76)
NEUTROPHILS NFR BLD AUTO: 6.29 10*3/MM3 (ref 1.7–7)
NRBC BLD AUTO-RTO: 0 /100 WBC (ref 0–0.2)
OPIATES UR QL: POSITIVE
OXYCODONE UR QL SCN: NEGATIVE
PCP UR QL SCN: NEGATIVE
PHOSPHATE SERPL-MCNC: 3.7 MG/DL (ref 2.5–4.5)
PLATELET # BLD AUTO: 275 10*3/MM3 (ref 140–450)
PMV BLD AUTO: 10 FL (ref 6–12)
POTASSIUM SERPL-SCNC: 3.1 MMOL/L (ref 3.5–5.2)
POTASSIUM SERPL-SCNC: 4.4 MMOL/L (ref 3.5–5.2)
PROPOXYPH UR QL: NEGATIVE
RBC # BLD AUTO: 4.42 10*6/MM3 (ref 3.77–5.28)
SODIUM SERPL-SCNC: 138 MMOL/L (ref 136–145)
TRICYCLICS UR QL SCN: NEGATIVE
WBC # BLD AUTO: 10.86 10*3/MM3 (ref 3.4–10.8)

## 2020-09-07 PROCEDURE — 71045 X-RAY EXAM CHEST 1 VIEW: CPT

## 2020-09-07 PROCEDURE — 25010000002 FUROSEMIDE PER 20 MG

## 2020-09-07 PROCEDURE — 99291 CRITICAL CARE FIRST HOUR: CPT | Performed by: INTERNAL MEDICINE

## 2020-09-07 PROCEDURE — 25010000002 CEFTRIAXONE PER 250 MG: Performed by: INTERNAL MEDICINE

## 2020-09-07 PROCEDURE — 94799 UNLISTED PULMONARY SVC/PX: CPT

## 2020-09-07 PROCEDURE — 94003 VENT MGMT INPAT SUBQ DAY: CPT

## 2020-09-07 PROCEDURE — 84132 ASSAY OF SERUM POTASSIUM: CPT | Performed by: INTERNAL MEDICINE

## 2020-09-07 PROCEDURE — 25010000002 DEXAMETHASONE PER 1 MG

## 2020-09-07 PROCEDURE — 85025 COMPLETE CBC W/AUTO DIFF WBC: CPT | Performed by: INTERNAL MEDICINE

## 2020-09-07 PROCEDURE — 25010000002 DEXAMETHASONE PER 1 MG: Performed by: INTERNAL MEDICINE

## 2020-09-07 PROCEDURE — 94002 VENT MGMT INPAT INIT DAY: CPT

## 2020-09-07 PROCEDURE — 25010000002 LORAZEPAM PER 2 MG

## 2020-09-07 PROCEDURE — 84100 ASSAY OF PHOSPHORUS: CPT | Performed by: INTERNAL MEDICINE

## 2020-09-07 PROCEDURE — 25010000002 PROPOFOL 10 MG/ML EMULSION: Performed by: EMERGENCY MEDICINE

## 2020-09-07 PROCEDURE — 93010 ELECTROCARDIOGRAM REPORT: CPT | Performed by: INTERNAL MEDICINE

## 2020-09-07 PROCEDURE — 25010000002 FENTANYL CITRATE (PF) 100 MCG/2ML SOLUTION: Performed by: INTERNAL MEDICINE

## 2020-09-07 PROCEDURE — 83735 ASSAY OF MAGNESIUM: CPT | Performed by: INTERNAL MEDICINE

## 2020-09-07 PROCEDURE — 25010000002 MORPHINE PER 10 MG

## 2020-09-07 PROCEDURE — 93005 ELECTROCARDIOGRAM TRACING: CPT | Performed by: INTERNAL MEDICINE

## 2020-09-07 PROCEDURE — 25010000002 MIDAZOLAM PER 1 MG: Performed by: INTERNAL MEDICINE

## 2020-09-07 PROCEDURE — 31500 INSERT EMERGENCY AIRWAY: CPT | Performed by: ANESTHESIOLOGY

## 2020-09-07 PROCEDURE — 25010000002 AZITHROMYCIN PER 500 MG: Performed by: INTERNAL MEDICINE

## 2020-09-07 PROCEDURE — 83605 ASSAY OF LACTIC ACID: CPT | Performed by: INTERNAL MEDICINE

## 2020-09-07 PROCEDURE — 82962 GLUCOSE BLOOD TEST: CPT

## 2020-09-07 PROCEDURE — 80306 DRUG TEST PRSMV INSTRMNT: CPT | Performed by: INTERNAL MEDICINE

## 2020-09-07 PROCEDURE — 63710000001 INSULIN ASPART PER 5 UNITS: Performed by: INTERNAL MEDICINE

## 2020-09-07 PROCEDURE — 25010000002 ENOXAPARIN PER 10 MG: Performed by: INTERNAL MEDICINE

## 2020-09-07 PROCEDURE — 80048 BASIC METABOLIC PNL TOTAL CA: CPT | Performed by: INTERNAL MEDICINE

## 2020-09-07 RX ORDER — DEXAMETHASONE SODIUM PHOSPHATE 4 MG/ML
INJECTION, SOLUTION INTRA-ARTICULAR; INTRALESIONAL; INTRAMUSCULAR; INTRAVENOUS; SOFT TISSUE
Status: COMPLETED
Start: 2020-09-07 | End: 2020-09-07

## 2020-09-07 RX ORDER — DEXAMETHASONE SODIUM PHOSPHATE 4 MG/ML
2 INJECTION, SOLUTION INTRA-ARTICULAR; INTRALESIONAL; INTRAMUSCULAR; INTRAVENOUS; SOFT TISSUE EVERY 8 HOURS
Status: DISCONTINUED | OUTPATIENT
Start: 2020-09-07 | End: 2020-09-08

## 2020-09-07 RX ORDER — QUETIAPINE FUMARATE 25 MG/1
50 TABLET, FILM COATED ORAL EVERY 8 HOURS SCHEDULED
Status: DISCONTINUED | OUTPATIENT
Start: 2020-09-07 | End: 2020-09-10

## 2020-09-07 RX ORDER — DEXAMETHASONE SODIUM PHOSPHATE 4 MG/ML
4 INJECTION, SOLUTION INTRA-ARTICULAR; INTRALESIONAL; INTRAMUSCULAR; INTRAVENOUS; SOFT TISSUE EVERY 8 HOURS
Status: DISCONTINUED | OUTPATIENT
Start: 2020-09-07 | End: 2020-09-07

## 2020-09-07 RX ORDER — MORPHINE SULFATE 2 MG/ML
2 INJECTION, SOLUTION INTRAMUSCULAR; INTRAVENOUS ONCE
Status: COMPLETED | OUTPATIENT
Start: 2020-09-07 | End: 2020-09-07

## 2020-09-07 RX ORDER — ALBUTEROL SULFATE 2.5 MG/3ML
2.5 SOLUTION RESPIRATORY (INHALATION) ONCE
Status: COMPLETED | OUTPATIENT
Start: 2020-09-07 | End: 2020-09-07

## 2020-09-07 RX ORDER — FENTANYL CITRATE 50 UG/ML
50 INJECTION, SOLUTION INTRAMUSCULAR; INTRAVENOUS
Status: DISCONTINUED | OUTPATIENT
Start: 2020-09-07 | End: 2020-09-09

## 2020-09-07 RX ORDER — DEXAMETHASONE SODIUM PHOSPHATE 4 MG/ML
4 INJECTION, SOLUTION INTRA-ARTICULAR; INTRALESIONAL; INTRAMUSCULAR; INTRAVENOUS; SOFT TISSUE ONCE
Status: COMPLETED | OUTPATIENT
Start: 2020-09-07 | End: 2020-09-07

## 2020-09-07 RX ORDER — FUROSEMIDE 10 MG/ML
40 INJECTION INTRAMUSCULAR; INTRAVENOUS ONCE
Status: COMPLETED | OUTPATIENT
Start: 2020-09-07 | End: 2020-09-07

## 2020-09-07 RX ORDER — POTASSIUM CHLORIDE 1.5 G/1.77G
40 POWDER, FOR SOLUTION ORAL AS NEEDED
Status: DISCONTINUED | OUTPATIENT
Start: 2020-09-07 | End: 2020-09-12 | Stop reason: HOSPADM

## 2020-09-07 RX ORDER — MIDAZOLAM HYDROCHLORIDE 1 MG/ML
2 INJECTION INTRAMUSCULAR; INTRAVENOUS EVERY 12 HOURS PRN
Status: DISCONTINUED | OUTPATIENT
Start: 2020-09-07 | End: 2020-09-07

## 2020-09-07 RX ORDER — MORPHINE SULFATE 2 MG/ML
INJECTION, SOLUTION INTRAMUSCULAR; INTRAVENOUS
Status: COMPLETED
Start: 2020-09-07 | End: 2020-09-07

## 2020-09-07 RX ORDER — FUROSEMIDE 10 MG/ML
INJECTION INTRAMUSCULAR; INTRAVENOUS
Status: COMPLETED
Start: 2020-09-07 | End: 2020-09-07

## 2020-09-07 RX ORDER — LORAZEPAM 2 MG/ML
0.5 INJECTION INTRAMUSCULAR ONCE
Status: COMPLETED | OUTPATIENT
Start: 2020-09-07 | End: 2020-09-07

## 2020-09-07 RX ORDER — MIDAZOLAM HYDROCHLORIDE 1 MG/ML
2 INJECTION INTRAMUSCULAR; INTRAVENOUS
Status: DISCONTINUED | OUTPATIENT
Start: 2020-09-07 | End: 2020-09-12 | Stop reason: HOSPADM

## 2020-09-07 RX ORDER — DEXAMETHASONE SODIUM PHOSPHATE 4 MG/ML
4 INJECTION, SOLUTION INTRA-ARTICULAR; INTRALESIONAL; INTRAMUSCULAR; INTRAVENOUS; SOFT TISSUE EVERY 6 HOURS
Status: DISCONTINUED | OUTPATIENT
Start: 2020-09-07 | End: 2020-09-07

## 2020-09-07 RX ORDER — LORAZEPAM 2 MG/ML
INJECTION INTRAMUSCULAR
Status: COMPLETED
Start: 2020-09-07 | End: 2020-09-07

## 2020-09-07 RX ADMIN — ENOXAPARIN SODIUM 40 MG: 40 INJECTION SUBCUTANEOUS at 08:10

## 2020-09-07 RX ADMIN — DEXAMETHASONE SODIUM PHOSPHATE 2 MG: 4 INJECTION, SOLUTION INTRAMUSCULAR; INTRAVENOUS at 20:38

## 2020-09-07 RX ADMIN — MIDAZOLAM HYDROCHLORIDE 2 MG: 2 INJECTION, SOLUTION INTRAMUSCULAR; INTRAVENOUS at 16:00

## 2020-09-07 RX ADMIN — AZITHROMYCIN MONOHYDRATE 500 MG: 500 INJECTION, POWDER, LYOPHILIZED, FOR SOLUTION INTRAVENOUS at 00:40

## 2020-09-07 RX ADMIN — INSULIN ASPART 2 UNITS: 100 INJECTION, SOLUTION INTRAVENOUS; SUBCUTANEOUS at 17:20

## 2020-09-07 RX ADMIN — POTASSIUM CHLORIDE 40 MEQ: 1.5 POWDER, FOR SOLUTION ORAL at 05:33

## 2020-09-07 RX ADMIN — CEFTRIAXONE 1 G: 1 INJECTION, POWDER, FOR SOLUTION INTRAMUSCULAR; INTRAVENOUS at 23:08

## 2020-09-07 RX ADMIN — LORAZEPAM 0.5 MG: 2 INJECTION INTRAMUSCULAR; INTRAVENOUS at 09:08

## 2020-09-07 RX ADMIN — MIDAZOLAM HYDROCHLORIDE 2 MG: 2 INJECTION, SOLUTION INTRAMUSCULAR; INTRAVENOUS at 17:00

## 2020-09-07 RX ADMIN — SODIUM CHLORIDE, PRESERVATIVE FREE 10 ML: 5 INJECTION INTRAVENOUS at 20:41

## 2020-09-07 RX ADMIN — LISINOPRIL 10 MG: 10 TABLET ORAL at 08:10

## 2020-09-07 RX ADMIN — MIDAZOLAM HYDROCHLORIDE 1 MG/HR: 1 INJECTION, SOLUTION INTRAMUSCULAR; INTRAVENOUS at 13:52

## 2020-09-07 RX ADMIN — BUDESONIDE AND FORMOTEROL FUMARATE DIHYDRATE 2 PUFF: 160; 4.5 AEROSOL RESPIRATORY (INHALATION) at 19:08

## 2020-09-07 RX ADMIN — DEXAMETHASONE SODIUM PHOSPHATE 4 MG: 4 INJECTION, SOLUTION INTRA-ARTICULAR; INTRALESIONAL; INTRAMUSCULAR; INTRAVENOUS; SOFT TISSUE at 09:10

## 2020-09-07 RX ADMIN — QUETIAPINE FUMARATE 50 MG: 25 TABLET ORAL at 21:29

## 2020-09-07 RX ADMIN — FENTANYL CITRATE 25 MCG: 50 INJECTION, SOLUTION INTRAMUSCULAR; INTRAVENOUS at 10:26

## 2020-09-07 RX ADMIN — FUROSEMIDE 40 MG: 10 INJECTION INTRAMUSCULAR; INTRAVENOUS at 08:48

## 2020-09-07 RX ADMIN — FUROSEMIDE 40 MG: 10 INJECTION, SOLUTION INTRAMUSCULAR; INTRAVENOUS at 08:48

## 2020-09-07 RX ADMIN — PROPOFOL 45 MCG/KG/MIN: 10 INJECTION, EMULSION INTRAVENOUS at 17:21

## 2020-09-07 RX ADMIN — PROPOFOL 40 MCG/KG/MIN: 10 INJECTION, EMULSION INTRAVENOUS at 01:08

## 2020-09-07 RX ADMIN — AZITHROMYCIN MONOHYDRATE 500 MG: 500 INJECTION, POWDER, LYOPHILIZED, FOR SOLUTION INTRAVENOUS at 23:58

## 2020-09-07 RX ADMIN — ALBUTEROL SULFATE 2 PUFF: 90 AEROSOL, METERED RESPIRATORY (INHALATION) at 07:15

## 2020-09-07 RX ADMIN — MORPHINE SULFATE 2 MG: 2 INJECTION, SOLUTION INTRAMUSCULAR; INTRAVENOUS at 09:07

## 2020-09-07 RX ADMIN — PROPOFOL 45 MCG/KG/MIN: 10 INJECTION, EMULSION INTRAVENOUS at 14:19

## 2020-09-07 RX ADMIN — RACEPINEPHRINE HYDROCHLORIDE 0.5 ML: 11.25 SOLUTION RESPIRATORY (INHALATION) at 09:16

## 2020-09-07 RX ADMIN — MIDAZOLAM HYDROCHLORIDE 2 MG: 2 INJECTION, SOLUTION INTRAMUSCULAR; INTRAVENOUS at 15:00

## 2020-09-07 RX ADMIN — LORAZEPAM 0.5 MG: 2 INJECTION INTRAMUSCULAR at 09:08

## 2020-09-07 RX ADMIN — FENTANYL CITRATE 25 MCG: 50 INJECTION, SOLUTION INTRAMUSCULAR; INTRAVENOUS at 08:48

## 2020-09-07 RX ADMIN — ALBUTEROL SULFATE 2 PUFF: 90 AEROSOL, METERED RESPIRATORY (INHALATION) at 13:12

## 2020-09-07 RX ADMIN — PROPOFOL 40 MCG/KG/MIN: 10 INJECTION, EMULSION INTRAVENOUS at 05:57

## 2020-09-07 RX ADMIN — SODIUM CHLORIDE 50 ML/HR: 9 INJECTION, SOLUTION INTRAVENOUS at 05:53

## 2020-09-07 RX ADMIN — ENOXAPARIN SODIUM 40 MG: 40 INJECTION SUBCUTANEOUS at 20:38

## 2020-09-07 RX ADMIN — POTASSIUM CHLORIDE 40 MEQ: 1.5 POWDER, FOR SOLUTION ORAL at 17:12

## 2020-09-07 RX ADMIN — FAMOTIDINE 20 MG: 10 INJECTION INTRAVENOUS at 08:11

## 2020-09-07 RX ADMIN — PROPOFOL 45 MCG/KG/MIN: 10 INJECTION, EMULSION INTRAVENOUS at 21:32

## 2020-09-07 RX ADMIN — MIDAZOLAM HYDROCHLORIDE 2 MG: 2 INJECTION, SOLUTION INTRAMUSCULAR; INTRAVENOUS at 18:00

## 2020-09-07 RX ADMIN — ALBUTEROL SULFATE 2.5 MG: 2.5 SOLUTION RESPIRATORY (INHALATION) at 09:17

## 2020-09-07 RX ADMIN — POTASSIUM CHLORIDE 40 MEQ: 1.5 POWDER, FOR SOLUTION ORAL at 09:16

## 2020-09-07 RX ADMIN — FENTANYL CITRATE 50 MCG: 50 INJECTION, SOLUTION INTRAMUSCULAR; INTRAVENOUS at 20:38

## 2020-09-07 RX ADMIN — QUETIAPINE FUMARATE 50 MG: 25 TABLET ORAL at 16:21

## 2020-09-07 RX ADMIN — DEXAMETHASONE SODIUM PHOSPHATE 4 MG: 4 INJECTION, SOLUTION INTRAMUSCULAR; INTRAVENOUS at 09:10

## 2020-09-07 RX ADMIN — ALBUTEROL SULFATE 2 PUFF: 90 AEROSOL, METERED RESPIRATORY (INHALATION) at 19:08

## 2020-09-07 RX ADMIN — FENTANYL CITRATE 25 MCG: 50 INJECTION, SOLUTION INTRAMUSCULAR; INTRAVENOUS at 11:35

## 2020-09-07 RX ADMIN — BUDESONIDE AND FORMOTEROL FUMARATE DIHYDRATE 2 PUFF: 160; 4.5 AEROSOL RESPIRATORY (INHALATION) at 07:15

## 2020-09-07 NOTE — ANESTHESIA PROCEDURE NOTES
Airway  Urgency: emergent    Date/Time: 9/7/2020 9:50 AM  End Time:9/7/2020 9:55 AM  Difficult airway    General Information and Staff    Patient location during procedure: ICU  Anesthesiologist: Susan Silverman DO    Consent for Airway (if performed for an anesthetic, see related documentation for consents)  Patient identity confirmed: arm band and hospital-assigned identification number  Consent: The procedure was performed in an emergent situation. Verbal consent not obtained. Written consent not obtained.  Risks and benefits: risks, benefits and alternatives were not discussed      Indications and Patient Condition  Indications for airway management: respiratory distress/failure and airway protection    Preoxygenated: yes  MILS maintained throughout  Mask difficulty assessment: 1 - vent by mask    Final Airway Details  Final airway type: endotracheal airway      Successful airway: ETT    Successful intubation technique: direct laryngoscopy  Endotracheal tube insertion site: oral  Blade: Rodriguez  Blade size: D  ETT size (mm): 7.5  Cormack-Lehane Classification: grade IIa - partial view of glottis  Placement verified by: chest auscultation and capnometry   Measured from: lips  ETT/EBT  to lips (cm): 22  Number of attempts at approach: 1  Assessment: lips, teeth, and gum same as pre-op and atraumatic intubation    Additional Comments  Pt ID'd  Tolerated well  Propofol 100mg given for preinduction

## 2020-09-07 NOTE — PLAN OF CARE
Problem: Patient Care Overview  Goal: Plan of Care Review  Outcome: Ongoing (interventions implemented as appropriate)  Flowsheets (Taken 9/7/2020 5172)  Progress: declining  Plan of Care Reviewed With: shilpa(sabrina)  Outcome Summary: Pt extubated @ 0850 and reintubated @ 0953; Patient developed stridor post extubated; Propofol infusing, PRN fentanyl and versed ordered as needed; VSS; will continue to monitor

## 2020-09-07 NOTE — PROGRESS NOTES
Feng Valverde MD                          [phone]      Patient ID:    Name:  Theresa Esquivel    MRN:  [id number]    1955   64 y.o.  female            Patient Care Team:  Pat Montiel APRN as PCP - General (Family Medicine)    CC/ Reason for visit: Pneumonia, respiratory failure, ventilator management    Subjective: Pt seen and examined this AM.  Patient did well overnight and was able to successfully wean off Versed and was on propofol this morning.  Extubated after she was nodding to questions and intermittently following commands when off sedation and did well initially but had postextubation stridor requiring reintubation after not doing well when given time and racemic epi nebulized medications.  No family at bedside to update.  Nurse notifies me that there is significant concern for drug abuse in the patient as well as family members.    ROS: Unable to obtain due to respiratory failure    Objective     Vital Signs past 24hrs    BP range: BP: ()/() 88/55  Pulse range: Heart Rate:  [] 80  Resp rate range: Resp:  [20-25] 20  Temp range: Temp (24hrs), Av.6 °F (37 °C), Min:98 °F (36.7 °C), Max:99.2 °F (37.3 °C)      Ventilator/Non-Invasive Ventilation Settings (From admission, onward)     Start     Ordered    20 0312  Ventilator - AC/VC+; 20; 90%; 5; 500  Continuous     Question Answer Comment   Vent Mode AC/VC+    Breath rate 20    FiO2 titrate for Sp02% =/> 90%    PEEP 5    Tidal Volume 500        20 0313                Device (Oxygen Therapy): ventilator FiO2 (%): 35 %     80.2 kg (176 lb 12.9 oz); Body mass index is 30.35 kg/m².      Intake/Output Summary (Last 24 hours) at 2020 1406  Last data filed at 2020 1300  Gross per 24 hour   Intake 3408 ml   Output 3725 ml   Net -317 ml       PHYSICAL EXAM   Constitutional: Middle aged pt in bed, sedated on the mechanical ventilator  Head: - NCAT  Eyes: No pallor,  Anicteric conjunctiva, EOMI.  ENMT:  ETT+, no oral thrush. Moist MM.   NECK: Trachea midline, No thyromegaly, no palpable cervical LNpathy  Heart: RRR, no murmur. No pedal edema   Lungs: ANGLE +, No wheezes/ crackles heard    Abdomen: Soft. No tenderness, guarding or rigidity. No palpable masses  Extremities: Extremities warm and well perfused. No cyanosis/ clubbing  Neuro: Sedated, no gross focal neuro deficits  Psych: Mood and affect UTO    Scheduled meds:    albuterol      albuterol sulfate HFA 2 puff Inhalation 4x Daily - RT   azithromycin 500 mg Intravenous Q24H   budesonide-formoterol 2 puff Inhalation BID - RT   cefTRIAXone 1 g Intravenous Q24H   dexamethasone 4 mg Intravenous Q8H   enoxaparin 40 mg Subcutaneous Q12H   famotidine 20 mg Intravenous Daily   insulin aspart 0-7 Units Subcutaneous TID AC   lisinopril 10 mg Oral Daily   sodium chloride 10 mL Intravenous Q12H       IV meds:                        midazolam 1 mg/hr Last Rate: 1 mg/hr (09/07/20 1352)   propofol 5-50 mcg/kg/min Last Rate: 50 mcg/kg/min (09/07/20 1030)       Data Review:      Results from last 7 days   Lab Units 09/07/20  0422 09/06/20  0239 09/05/20  0532 09/04/20  2316   SODIUM mmol/L 138 141 138 138   POTASSIUM mmol/L 3.1* 4.1 3.5 3.6   CHLORIDE mmol/L 105 106 103 95*   CO2 mmol/L 24.0 23.0 23.0 18.0*   BUN mg/dL 21 15 15 10   CREATININE mg/dL 0.82 0.87 0.99 1.13*   CALCIUM mg/dL 8.3* 9.3 9.2 9.4   BILIRUBIN mg/dL  --   --   --  0.5   ALK PHOS U/L  --   --   --  115   ALT (SGPT) U/L  --   --   --  22   AST (SGOT) U/L  --   --   --  30   GLUCOSE mg/dL 109* 141* 180* 234*   WBC 10*3/mm3 10.86* 17.89* 11.68* 15.90*   HEMOGLOBIN g/dL 13.6 15.6 14.6 16.2*   PLATELETS 10*3/mm3 275 296 297 363   PROBNP pg/mL  --   --   --  146.8   PROCALCITONIN ng/mL  --  0.97*  --   --        Lab Results   Component Value Date    CALCIUM 8.3 (L) 09/07/2020    PHOS 3.7 09/07/2020       Results from last 7 days   Lab Units 09/05/20  1611 09/05/20  0531  09/05/20  0046   BLOODCX   --  No growth at 2 days No growth at 2 days   RESPCX  Scant growth (1+) Normal Respiratory Cele: NO S.aureus/MRSA or Pseudomonas aeruginosa  --   --        Results from last 7 days   Lab Units 09/05/20  0453 09/05/20  0008   PH, ARTERIAL pH units 7.385 7.318*   PO2 ART mm Hg 92.5 176.0*   PCO2, ARTERIAL mm Hg 38.4 45.3*   HCO3 ART mmol/L 23.0 23.2        Results Review:    I have reviewed the relevant laboratory results and independently reviewed the chest imaging from this hospitalization including the available echocardiogram reports personally and summarized it if/ when appropriate below    Assessment    Acute hypoxic/hypercapnic respiratory failure s/p mechanical ventilation 9/5   Post extubation stridor - needing reintubation 9/7  Bibasilar pneumonia  Sepsis  Acute on chronic congestive heart failure  COVID-19 negative x2  Mild lactic acidosis  Suspected drug abuse  Remote breast cancer  Chronic pain syndrome on narcotics  Remote tobacco abuse    PLAN:  Patient was doing better this morning and on minimal ventilator support.  Opening eyes and nodding to questions but not completely following commands.  Was on significant sedation yesterday which has been tapered off and propofol was discontinued and patient extubated successfully but had post extubation stridor.  We attempted nebulizer treatment as well as racemic epi and observe the patient closely for several minutes but patient did not make progress and was getting increasingly anxious and short of breath at which point it was decided to reintubate the patient.  Patient started on steroids for suspected vocal cord edema.  Continue with antibiotics course.  Noted elevated procalcitonin.  Agree with diuretics to keep her negative.  Nurse reports suspected drug abuse with significant family concerns and hence we will get a UDS.  Continue with sedation as well as PRN narcotics  Full code    DVT/GI prophylaxis    CC-45 mins    I have  discussed my findings and recommendations with patient, nursing staff and primary care team.     Feng Valverde MD  9/7/2020

## 2020-09-07 NOTE — PLAN OF CARE
K+ this AM 3.1. Orders received to give 40meq q 4hrs x3 doses. 2nd dose due at 0930. Sedation holiday completed with pt not responding to RN or commands. Pt thrashes arms, legs and head with no purposeful movement.

## 2020-09-07 NOTE — PROGRESS NOTES
[facility] Medicine Services  INPATIENT PROGRESS NOTE    Length of Stay: 2  Date of Admission: 9/4/2020  Primary Care Physician: Pat Montiel APRN    Subjective   Chief Complaint: Respiratory failure.    HPI: Patient is seen for follow-up.  She is a 64-year-old female with history of nicotine dependence, chronic pain syndrome, chronic bronchitis, hypertension who was admitted for acute hypoxic/hypercapnic respiratory failure requiring endotracheal intubation.    She she was extubated this morning but had severe bronchospasm( refractory to racemic epinephrine) and had to be reintubated.  She remains intubated, sedated and restrained.    Review of Systems  Unable to review as the patient is intubated and sedated.  Objective    Temp:  [98.5 °F (36.9 °C)-99.2 °F (37.3 °C)] 98.5 °F (36.9 °C)  Heart Rate:  [] 120  Resp:  [20-25] 25  BP: ()/() 142/89  FiO2 (%):  [35 %] 35 %    Vent Settings:  FiO2 (%):  [35 %] 35 %  S RR:  [20] 20  PEEP/CPAP (cm H2O):  [5 cm H20] 5 cm H20  WV SUP:  [0 cm H20] 0 cm H20  MAP (cm H2O):  [8.3-13] 8.3    Physical Exam   Constitutional: She appears well-developed and well-nourished. She is cooperative. No distress. She is sedated, intubated and restrained.   HENT:   Head: Normocephalic and atraumatic.   Right Ear: External ear normal.   Left Ear: External ear normal.   Nose: Nose normal.   Mouth/Throat: Oropharynx is clear and moist.   Eyes: Conjunctivae and EOM are normal.   Neck: Neck supple. No JVD present. No thyromegaly present.   Cardiovascular: Regular rhythm, normal heart sounds and intact distal pulses. Tachycardia present. Exam reveals no gallop and no friction rub.   No murmur heard.  Pulmonary/Chest: Effort normal. No stridor. She is intubated. No respiratory distress. She has decreased breath sounds. She has no wheezes. She has rhonchi. She has no rales. She exhibits no tenderness.   Abdominal: Soft. Bowel  sounds are normal. She exhibits no distension and no mass. There is no tenderness. There is no rebound and no guarding. No hernia.   Musculoskeletal: She exhibits no edema, tenderness or deformity.   Neurological: She has normal reflexes. She exhibits normal muscle tone.   Skin: Skin is warm and dry. No rash noted. She is not diaphoretic. No erythema. No pallor.   Nursing note and vitals reviewed.        Medication Review:    Current Facility-Administered Medications:   •  albuterol (PROVENTIL) (5 MG/ML) 0.5% nebulizer solution  - ADS Override Pull, , , ,   •  albuterol sulfate HFA (PROVENTIL HFA;VENTOLIN HFA;PROAIR HFA) inhaler 2 puff, 2 puff, Inhalation, 4x Daily - RT, Eulogio Gonzalez MD, 2 puff at 09/07/20 0715  •  AZITHROMYCIN 500 MG/250 ML 0.9% NS IVPB (vial-mate), 500 mg, Intravenous, Q24H, Dagoberto Crum MD, 500 mg at 09/07/20 0040  •  budesonide-formoterol (SYMBICORT) 160-4.5 MCG/ACT inhaler 2 puff, 2 puff, Inhalation, BID - RT, Eulogio Gonzalez MD, 2 puff at 09/07/20 0715  •  cefTRIAXone (ROCEPHIN) 1 g/100 mL 0.9% NS (MBP), 1 g, Intravenous, Q24H, Eulogio Gonzalez MD, 1 g at 09/06/20 2330  •  dextrose (D50W) 25 g/ 50mL Intravenous Solution 25 g, 25 g, Intravenous, Q15 Min PRN, Dagoberto Crum MD  •  dextrose (GLUTOSE) oral gel 15 g, 15 g, Oral, Q15 Min PRN, Dagoberto Crum MD  •  enalaprilat (VASOTEC) injection 1.25 mg, 1.25 mg, Intravenous, Q6H PRN, Eulogio Gonzalez MD  •  enoxaparin (LOVENOX) syringe 40 mg, 40 mg, Subcutaneous, Q12H, Dagoberto Crum MD, 40 mg at 09/07/20 0810  •  famotidine (PEPCID) injection 20 mg, 20 mg, Intravenous, Daily, Eulogio Gonzalez MD, 20 mg at 09/07/20 0811  •  fentaNYL citrate (PF) (SUBLIMAZE) injection 25 mcg, 25 mcg, Intravenous, Q1H PRN, Feng Valverde MD, 25 mcg at 09/07/20 1026  •  glucagon (human recombinant) (GLUCAGEN DIAGNOSTIC) injection 1 mg, 1 mg, Subcutaneous, Q15 Min PRN, Dagoberto Crum MD  •  insulin aspart (novoLOG)  injection 0-7 Units, 0-7 Units, Subcutaneous, TID AC, Dagoberto Crum MD, Stopped at 09/06/20 0730  •  lisinopril (PRINIVIL,ZESTRIL) tablet 10 mg, 10 mg, Oral, Daily, EchenduEulogio MD, 10 mg at 09/07/20 0810  •  midazolam (VERSED) 50mg/100mL normal saline infusion, 1 mg/hr, Intravenous, Titrated, Dagoberto Crum MD, Last Rate: 2 mL/hr at 09/06/20 1056, 1 mg/hr at 09/06/20 1056  •  potassium chloride (KLOR-CON) packet 40 mEq, 40 mEq, Oral, PRN, Preet Chaidez, , 40 mEq at 09/07/20 0916  •  propofol (DIPRIVAN) infusion 10 mg/mL 100 mL, 5-50 mcg/kg/min, Intravenous, Titrated, Raghav Lopez MD, Stopped at 09/07/20 0845  •  sodium chloride 0.9 % flush 10 mL, 10 mL, Intravenous, PRN, Raghav Lopez MD, 10 mL at 09/05/20 2149  •  sodium chloride 0.9 % flush 10 mL, 10 mL, Intravenous, Q12H, Dagoberto Crum MD, 10 mL at 09/06/20 2046  •  sodium chloride 0.9 % flush 10 mL, 10 mL, Intravenous, PRN, Dagoberto Crum MD    Results Review:  I have reviewed the labs, radiology results, and diagnostic studies.    Laboratory Data:   Results from last 7 days   Lab Units 09/07/20  0422 09/06/20  0239 09/05/20  0532 09/04/20  2316   SODIUM mmol/L 138 141 138 138   POTASSIUM mmol/L 3.1* 4.1 3.5 3.6   CHLORIDE mmol/L 105 106 103 95*   CO2 mmol/L 24.0 23.0 23.0 18.0*   BUN mg/dL 21 15 15 10   CREATININE mg/dL 0.82 0.87 0.99 1.13*   GLUCOSE mg/dL 109* 141* 180* 234*   CALCIUM mg/dL 8.3* 9.3 9.2 9.4   BILIRUBIN mg/dL  --   --   --  0.5   ALK PHOS U/L  --   --   --  115   ALT (SGPT) U/L  --   --   --  22   AST (SGOT) U/L  --   --   --  30   ANION GAP mmol/L 9.0 12.0 12.0 25.0*     Estimated Creatinine Clearance: 71 mL/min (by C-G formula based on SCr of 0.82 mg/dL).  Results from last 7 days   Lab Units 09/07/20 0422 09/06/20 0239 09/05/20  0532   MAGNESIUM mg/dL 1.9 2.2 2.1   PHOSPHORUS mg/dL 3.7 3.4 2.8         Results from last 7 days   Lab Units 09/07/20 0422 09/06/20 0239 09/05/20  0532 09/04/20  2316    WBC 10*3/mm3 10.86* 17.89* 11.68* 15.90*   HEMOGLOBIN g/dL 13.6 15.6 14.6 16.2*   HEMATOCRIT % 39.6 46.8* 43.2 49.9*   PLATELETS 10*3/mm3 275 296 297 363           Culture Data:   Blood Culture   Date Value Ref Range Status   09/05/2020 No growth at 2 days  Preliminary   09/05/2020 No growth at 2 days  Preliminary     No results found for: URINECX  Respiratory Culture   Date Value Ref Range Status   09/05/2020   Final    Scant growth (1+) Normal Respiratory Cele: NO S.aureus/MRSA or Pseudomonas aeruginosa     No results found for: WOUNDCX  No results found for: STOOLCX  No components found for: BODYFLD    Radiology Data:   Imaging Results (Last 24 Hours)     Procedure Component Value Units Date/Time    XR Chest 1 View [[id number]] Resulted:  09/07/20 1007     Updated:  09/07/20 1034    XR Chest 1 View [[id number]] Collected:  09/07/20 0530     Updated:  09/07/20 0728    Narrative:         PROCEDURE: Single chest view portable    REASON FOR EXAM:Follow-up respiratory failure., J96.91  Respiratory failure, unspecified with hypoxia J96.92 Respiratory  failure, unspecified with hypercapnia J18.9 Pneumonia,  unspecified organism R79.89 Other specified abnormal findings of  blood chemistry    FINDINGS: Comparison exam dated September 4, 2020. ET tube with  tip 3 cm above melissa. Feeding tube with tip below level  diaphragm. Cardiac size appears within normal limits. Mild  pulmonary vasculature redistribution. Marked improvement in  bilateral perihilar and bibasilar interstitial opacities with  residual minimal interstitial opacities in the lung bases. Lungs  are otherwise clear. Pleural spaces are normal. No acute osseous  abnormality.      Impression:       1.  Marked improvement in bilateral perihilar and bibasilar  interstitial opacities with residual minimal interstitial  opacities in the lung bases. This suggests marked improvement in  pulmonary edema and/or pneumonia.  2.  Tubes as described  above.    Electronically signed by:  Tyler Childers MD  9/7/2020 7:27 AM CDT  Workstation: [id number]    XR Abdomen KUB [[id number]] Collected:  09/06/20 1541     Updated:  09/06/20 1614    Narrative:       Exam:  KUB    History:  Cortrak placement    Supine film of the abdomen was obtained portably at 3:41 PM.    Comparison: December 23, 2013    Feeding tube in place with tip in the left upper quadrant,  presumably in the proximal jejunum.  Razo catheter.  No mechanical bowel obstruction.  No organomegaly.  Pelvic phleboliths.  Atherosclerotic calcification abdominal aorta..  No acute osseous abnormality.      Impression:       Conclusion:  Feeding tube in place with tip in the left upper quadrant,  presumably in the proximal jejunum.      [id number]    Electronically signed by:  Satnam Johnson MD  9/6/2020 4:13 PM CDT  Workstation: [id number]          ABG:  Results from last 7 days   Lab Units 09/05/20  0453   PH, ARTERIAL pH units 7.385   PO2 ART mm Hg 92.5   PCO2, ARTERIAL mm Hg 38.4   HCO3 ART mmol/L 23.0       I have reviewed the patient's current medications.     Assessment/Plan     Hospital Problem List:  Active Problems:    Respiratory failure with hypoxia and hypercapnia (CMS/HCC)    Acute respiratory failure with hypoxia and hypercapnia (secondary to bilateral pneumonia to rule out COVID-19 viral infection): Continue ventilatory support, IV antibiotics and inhalers.  Blood cultures, respiratory cultures showed no growth and COVID-19 PCR were negative x2.  Leukocytosis is reactive and much improved.  Chest x-ray done this a.m. showed much improvement.  Input by pulmonary is appreciated.    Sepsis secondary to bilateral pneumonia (to rule out COVID-19 viral infection): Continue IV antibiotics.  Preliminary blood cultures show no growth.  Continue to trend CBC and lactic acid levels.     Azotemia: Resolved.    Elevated d-dimer is likely due to acute illness and CT pulmonary angiogram is negative for pulmonary  embolism.    Hyperglycemia is likely due to acute illness as patient is not a known diabetic.  Hemoglobin A1c is 5.90.  Continue Accu-Cheks and sliding scale insulin.    Hypertension: Stable.  Continue lisinopril.      Nicotine dependence: Continue nicotine patch.  Patient will receive counseling postextubation.    Nutrition: Continue  NG tube feeding today.  Dietitian is following.      Continue GI and DVT prophylaxis.      Discharge Planning: In progress.    Eulogio Gonzalez MD   09/07/20   10:41

## 2020-09-07 NOTE — PLAN OF CARE
Problem: Patient Care Overview  Goal: Plan of Care Review  Outcome: Ongoing (interventions implemented as appropriate)  Flowsheets (Taken 9/7/2020 1602)  Progress: no change  Plan of Care Reviewed With: other (see comments) (RN)  Outcome Summary: Unsuccessful attempt to extubate. Tube feeding has been started.  Note:   Tube feeding as prescribed advancing to goal rate as tolerated.

## 2020-09-07 NOTE — NURSING NOTE
Dr. Velezvolu at bedside and stopped propofol and placed patient on PS on vent, patient tolerating, however, was agitated and would not follow commands. Orders for extubation. Patient extubated @ 0850. Patient O2 sat remained 90-95% on 4L. Around 0855, patient developed stridor. MD notified. Racemic Epi neb given x2 doses. Patient did not respond. Patient also received IV steroid. Patient remained hypertensive, tachycardic and tachypenic. Patient color was very reddish purple. Patient given ativan and morphine to help relax, did not work. MD notified. Ordered for reintubation. Patient reintubated @ 0953.

## 2020-09-07 NOTE — PLAN OF CARE
Problem: Ventilation, Mechanical Invasive (Adult)  Goal: Signs and Symptoms of Listed Potential Problems Will be Absent, Minimized or Managed (Ventilation, Mechanical Invasive)  Outcome: Ongoing (interventions implemented as appropriate)  Flowsheets (Taken 9/7/2020 0356)  Problems Assessed (Mechanical Ventilation, Invasive): all  Problems Present (Mech Vent, Invasive): inability to wean; situational response  Intervention: Prevent Airway Displacement/Mechanical Dysfunction  Flowsheets (Taken 9/7/2020 0356)  Airway Safety Measures: mask at bedside; manual resuscitator at bedside; manual resuscitator/mask/valve in room; suction at bedside  Intervention: Prevent Airway-Related Skin/Tissue Breakdown  Flowsheets (Taken 9/7/2020 0356)  Device Skin Pressure Protection: skin-to-skin areas padded; skin-to-device areas padded  Intervention: Prevent Ventilator-Induced Lung Injury  Flowsheets (Taken 9/7/2020 0356)  Lung Protection Measures: plateau/inspiratory pressure monitored; ventilator waveforms monitored

## 2020-09-07 NOTE — CONSULTS
Adult Nutrition  Assessment    Patient Name:  Theresa Esquivel  YOB: 1955  MRN: [id number]  Admit Date:  9/4/2020    Assessment Date:  9/7/2020    Comments:  Pt remains intubated.  RN indicates unsuccessful attempt was made this AM to extubate pt.  RN indicates pt is receiving tube feeding at 20 cc/hr which is providing ~ 576 marcos and 28 grams pro/day.  Pt is receiving Diprivan at 26.4cc/hr which is providing ~ 696 marcos/day.  Blood glucose, HgbA1C are elevated.  Sliding scale novolog prescribed.  K+ is low.  PRN KCl prescribed.  RN indicates p had 1 episode of watery BM this AM which she believes was related to pt coughing.  Will maintain pt on tube feeding as prescribed advancing to goal rate as tolerated.    Reason for Assessment     Row Name 09/07/20 1539          Reason for Assessment    Reason For Assessment  follow-up protocol             Labs/Tests/Procedures/Meds     Row Name 09/07/20 1539          Labs/Procedures/Meds    Lab Results Reviewed  reviewed, pertinent        Medications    Pertinent Medications Reviewed  reviewed, pertinent         Physical Findings     Row Name 09/07/20 1542          Physical Findings    Tubes  nasogastric tube;orogastric tube           Nutrition Prescription Ordered     Row Name 09/07/20 1542          Nutrition Prescription PO    Current PO Diet  NPO        Nutrition Prescription EN    Enteral Route  NG     Product  Diabetisource AC (Glucerna 1.2)     TF Delivery Method  Continuous     Continuous TF Goal Rate (mL/hr)  45 mL/hr     Continuous TF Current Rate (mL/hr)  20 mL/hr     Continuous TF Goal Volume (mL)  1080 mL     Continuous TF Current Volume (mL)  480 mL     Water flush (mL)   25 mL     Water Flush Frequency  Per hour        Propofol Considerations    Propofol (mL/hr)  26.4 mL/hr     Propofol (Kcal/day)  696 Kcal/day         Evaluation of Received Nutrient/Fluid Intake     Row Name 09/07/20 1544          Calories Evaluation    Enteral Calories (kcal)  576      Oral Calories (kcal)  0     Total Calories (kcal)  576     % of Kcal Needs  38        Protein Evaluation    Enteral Protein (gm)  28     Oral Protein (gm)  0     Total Protein (gm)  28     % of Protein Needs  38        Intake Assessment    Energy/Calorie Requirement Assessment  not meeting needs     Protein Requirement Assessment  not meeting needs     Fluid Requirement Assessment  not meeting needs     Tolerance  other (see comments) RN indicates pt had 1 episode of watery stool which she believes is related to pt coughing.        Fluid Intake Evaluation    Enteral (Free Water) Fluid (mL)  393     Free Water Flush Fluid (mL)  600     Total Free Water Intake (mL)  993 mL     Parenteral Fluid (mL)  0     Oral Fluid (mL)  0     IV Fluid (mL)  0     Other Fluid (mL)  0     Total Fluid Intake (mL)  993 mL               Electronically signed by:  Mami Street RD  09/07/20 15:53

## 2020-09-08 ENCOUNTER — APPOINTMENT (OUTPATIENT)
Dept: CT IMAGING | Facility: HOSPITAL | Age: 65
End: 2020-09-08

## 2020-09-08 LAB
ANION GAP SERPL CALCULATED.3IONS-SCNC: 10 MMOL/L (ref 5–15)
ARTERIAL PATENCY WRIST A: POSITIVE
ATMOSPHERIC PRESS: 747 MMHG
BASE EXCESS BLDA CALC-SCNC: 1.3 MMOL/L (ref 0–2)
BASOPHILS # BLD AUTO: 0.01 10*3/MM3 (ref 0–0.2)
BASOPHILS NFR BLD AUTO: 0.1 % (ref 0–1.5)
BDY SITE: ABNORMAL
BUN SERPL-MCNC: 25 MG/DL (ref 8–23)
BUN/CREAT SERPL: 33.8 (ref 7–25)
CALCIUM SPEC-SCNC: 8.8 MG/DL (ref 8.6–10.5)
CHLORIDE SERPL-SCNC: 104 MMOL/L (ref 98–107)
CO2 SERPL-SCNC: 23 MMOL/L (ref 22–29)
CREAT SERPL-MCNC: 0.74 MG/DL (ref 0.57–1)
DEPRECATED RDW RBC AUTO: 45.2 FL (ref 37–54)
EOSINOPHIL # BLD AUTO: 0 10*3/MM3 (ref 0–0.4)
EOSINOPHIL NFR BLD AUTO: 0 % (ref 0.3–6.2)
ERYTHROCYTE [DISTWIDTH] IN BLOOD BY AUTOMATED COUNT: 13.3 % (ref 12.3–15.4)
GFR SERPL CREATININE-BSD FRML MDRD: 79 ML/MIN/1.73
GLUCOSE BLDC GLUCOMTR-MCNC: 132 MG/DL (ref 70–130)
GLUCOSE BLDC GLUCOMTR-MCNC: 139 MG/DL (ref 70–130)
GLUCOSE BLDC GLUCOMTR-MCNC: 146 MG/DL (ref 70–130)
GLUCOSE SERPL-MCNC: 153 MG/DL (ref 65–99)
HCO3 BLDA-SCNC: 24.9 MMOL/L (ref 20–26)
HCT VFR BLD AUTO: 43.6 % (ref 34–46.6)
HGB BLD-MCNC: 14.7 G/DL (ref 12–15.9)
IMM GRANULOCYTES # BLD AUTO: 0.1 10*3/MM3 (ref 0–0.05)
IMM GRANULOCYTES NFR BLD AUTO: 0.8 % (ref 0–0.5)
INHALED O2 CONCENTRATION: 35 %
LYMPHOCYTES # BLD AUTO: 1.46 10*3/MM3 (ref 0.7–3.1)
LYMPHOCYTES NFR BLD AUTO: 12 % (ref 19.6–45.3)
Lab: ABNORMAL
MAGNESIUM SERPL-MCNC: 2.3 MG/DL (ref 1.6–2.4)
MCH RBC QN AUTO: 30.7 PG (ref 26.6–33)
MCHC RBC AUTO-ENTMCNC: 33.7 G/DL (ref 31.5–35.7)
MCV RBC AUTO: 91 FL (ref 79–97)
MODALITY: ABNORMAL
MONOCYTES # BLD AUTO: 0.63 10*3/MM3 (ref 0.1–0.9)
MONOCYTES NFR BLD AUTO: 5.2 % (ref 5–12)
NEUTROPHILS NFR BLD AUTO: 81.9 % (ref 42.7–76)
NEUTROPHILS NFR BLD AUTO: 9.93 10*3/MM3 (ref 1.7–7)
NRBC BLD AUTO-RTO: 0 /100 WBC (ref 0–0.2)
PCO2 BLDA: 35.4 MM HG (ref 35–45)
PEEP RESPIRATORY: 5 CM[H2O]
PH BLDA: 7.46 PH UNITS (ref 7.35–7.45)
PHOSPHATE SERPL-MCNC: 3.3 MG/DL (ref 2.5–4.5)
PLATELET # BLD AUTO: 272 10*3/MM3 (ref 140–450)
PMV BLD AUTO: 10.5 FL (ref 6–12)
PO2 BLDA: 77.3 MM HG (ref 83–108)
POTASSIUM SERPL-SCNC: 4 MMOL/L (ref 3.5–5.2)
RBC # BLD AUTO: 4.79 10*6/MM3 (ref 3.77–5.28)
SAO2 % BLDCOA: 94.5 % (ref 94–99)
SET MECH RESP RATE: 20
SODIUM SERPL-SCNC: 137 MMOL/L (ref 136–145)
VENTILATOR MODE: AC
VT ON VENT VENT: 500 ML
WBC # BLD AUTO: 12.13 10*3/MM3 (ref 3.4–10.8)
WHOLE BLOOD HOLD SPECIMEN: NORMAL

## 2020-09-08 PROCEDURE — 25010000002 PROPOFOL 10 MG/ML EMULSION: Performed by: EMERGENCY MEDICINE

## 2020-09-08 PROCEDURE — 80048 BASIC METABOLIC PNL TOTAL CA: CPT | Performed by: INTERNAL MEDICINE

## 2020-09-08 PROCEDURE — 84100 ASSAY OF PHOSPHORUS: CPT | Performed by: INTERNAL MEDICINE

## 2020-09-08 PROCEDURE — 25010000002 FUROSEMIDE PER 20 MG: Performed by: INTERNAL MEDICINE

## 2020-09-08 PROCEDURE — 94799 UNLISTED PULMONARY SVC/PX: CPT

## 2020-09-08 PROCEDURE — 82962 GLUCOSE BLOOD TEST: CPT

## 2020-09-08 PROCEDURE — 85025 COMPLETE CBC W/AUTO DIFF WBC: CPT | Performed by: INTERNAL MEDICINE

## 2020-09-08 PROCEDURE — 83735 ASSAY OF MAGNESIUM: CPT | Performed by: INTERNAL MEDICINE

## 2020-09-08 PROCEDURE — 25010000002 ENOXAPARIN PER 10 MG: Performed by: INTERNAL MEDICINE

## 2020-09-08 PROCEDURE — 25010000002 FENTANYL CITRATE (PF) 100 MCG/2ML SOLUTION: Performed by: INTERNAL MEDICINE

## 2020-09-08 PROCEDURE — 93010 ELECTROCARDIOGRAM REPORT: CPT | Performed by: INTERNAL MEDICINE

## 2020-09-08 PROCEDURE — 93005 ELECTROCARDIOGRAM TRACING: CPT | Performed by: INTERNAL MEDICINE

## 2020-09-08 PROCEDURE — 82803 BLOOD GASES ANY COMBINATION: CPT

## 2020-09-08 PROCEDURE — 63710000001 INSULIN ASPART PER 5 UNITS: Performed by: INTERNAL MEDICINE

## 2020-09-08 PROCEDURE — 36600 WITHDRAWAL OF ARTERIAL BLOOD: CPT

## 2020-09-08 PROCEDURE — 25010000002 DEXAMETHASONE PER 1 MG: Performed by: INTERNAL MEDICINE

## 2020-09-08 PROCEDURE — 25010000002 CEFTRIAXONE PER 250 MG: Performed by: INTERNAL MEDICINE

## 2020-09-08 PROCEDURE — 99291 CRITICAL CARE FIRST HOUR: CPT | Performed by: INTERNAL MEDICINE

## 2020-09-08 PROCEDURE — 94003 VENT MGMT INPAT SUBQ DAY: CPT

## 2020-09-08 PROCEDURE — 70450 CT HEAD/BRAIN W/O DYE: CPT

## 2020-09-08 PROCEDURE — 25010000002 MIDAZOLAM PER 1 MG: Performed by: INTERNAL MEDICINE

## 2020-09-08 RX ORDER — DEXAMETHASONE SODIUM PHOSPHATE 4 MG/ML
2 INJECTION, SOLUTION INTRA-ARTICULAR; INTRALESIONAL; INTRAMUSCULAR; INTRAVENOUS; SOFT TISSUE EVERY 8 HOURS
Status: COMPLETED | OUTPATIENT
Start: 2020-09-08 | End: 2020-09-08

## 2020-09-08 RX ORDER — FUROSEMIDE 10 MG/ML
20 INJECTION INTRAMUSCULAR; INTRAVENOUS DAILY
Status: DISCONTINUED | OUTPATIENT
Start: 2020-09-08 | End: 2020-09-10

## 2020-09-08 RX ORDER — DOXYCYCLINE 25 MG/5ML
100 POWDER, FOR SUSPENSION ORAL EVERY 12 HOURS SCHEDULED
Status: DISCONTINUED | OUTPATIENT
Start: 2020-09-08 | End: 2020-09-09

## 2020-09-08 RX ADMIN — MIDAZOLAM HYDROCHLORIDE 2 MG: 2 INJECTION, SOLUTION INTRAMUSCULAR; INTRAVENOUS at 12:29

## 2020-09-08 RX ADMIN — DEXAMETHASONE SODIUM PHOSPHATE 2 MG: 4 INJECTION, SOLUTION INTRAMUSCULAR; INTRAVENOUS at 12:20

## 2020-09-08 RX ADMIN — FENTANYL CITRATE 50 MCG: 50 INJECTION, SOLUTION INTRAMUSCULAR; INTRAVENOUS at 18:09

## 2020-09-08 RX ADMIN — DOXYCYCLINE 100 MG: 25 FOR SUSPENSION ORAL at 21:01

## 2020-09-08 RX ADMIN — INSULIN ASPART 2 UNITS: 100 INJECTION, SOLUTION INTRAVENOUS; SUBCUTANEOUS at 06:40

## 2020-09-08 RX ADMIN — ALBUTEROL SULFATE 2 PUFF: 90 AEROSOL, METERED RESPIRATORY (INHALATION) at 15:31

## 2020-09-08 RX ADMIN — PROPOFOL 45 MCG/KG/MIN: 10 INJECTION, EMULSION INTRAVENOUS at 20:59

## 2020-09-08 RX ADMIN — PROPOFOL 45 MCG/KG/MIN: 10 INJECTION, EMULSION INTRAVENOUS at 03:26

## 2020-09-08 RX ADMIN — ENOXAPARIN SODIUM 40 MG: 40 INJECTION SUBCUTANEOUS at 08:41

## 2020-09-08 RX ADMIN — ALBUTEROL SULFATE 2 PUFF: 90 AEROSOL, METERED RESPIRATORY (INHALATION) at 11:11

## 2020-09-08 RX ADMIN — PROPOFOL 40 MCG/KG/MIN: 10 INJECTION, EMULSION INTRAVENOUS at 15:58

## 2020-09-08 RX ADMIN — SODIUM CHLORIDE, PRESERVATIVE FREE 10 ML: 5 INJECTION INTRAVENOUS at 21:03

## 2020-09-08 RX ADMIN — ALBUTEROL SULFATE 2 PUFF: 90 AEROSOL, METERED RESPIRATORY (INHALATION) at 07:21

## 2020-09-08 RX ADMIN — ENOXAPARIN SODIUM 40 MG: 40 INJECTION SUBCUTANEOUS at 20:59

## 2020-09-08 RX ADMIN — FENTANYL CITRATE 50 MCG: 50 INJECTION, SOLUTION INTRAMUSCULAR; INTRAVENOUS at 12:20

## 2020-09-08 RX ADMIN — SODIUM CHLORIDE, PRESERVATIVE FREE 10 ML: 5 INJECTION INTRAVENOUS at 08:43

## 2020-09-08 RX ADMIN — QUETIAPINE FUMARATE 50 MG: 25 TABLET ORAL at 13:31

## 2020-09-08 RX ADMIN — ALBUTEROL SULFATE 2 PUFF: 90 AEROSOL, METERED RESPIRATORY (INHALATION) at 18:33

## 2020-09-08 RX ADMIN — QUETIAPINE FUMARATE 50 MG: 25 TABLET ORAL at 21:00

## 2020-09-08 RX ADMIN — FENTANYL CITRATE 50 MCG: 50 INJECTION, SOLUTION INTRAMUSCULAR; INTRAVENOUS at 20:59

## 2020-09-08 RX ADMIN — BUDESONIDE AND FORMOTEROL FUMARATE DIHYDRATE 2 PUFF: 160; 4.5 AEROSOL RESPIRATORY (INHALATION) at 18:33

## 2020-09-08 RX ADMIN — QUETIAPINE FUMARATE 50 MG: 25 TABLET ORAL at 05:24

## 2020-09-08 RX ADMIN — MIDAZOLAM HYDROCHLORIDE 2 MG: 2 INJECTION, SOLUTION INTRAMUSCULAR; INTRAVENOUS at 15:57

## 2020-09-08 RX ADMIN — MIDAZOLAM HYDROCHLORIDE 2 MG: 2 INJECTION, SOLUTION INTRAMUSCULAR; INTRAVENOUS at 05:25

## 2020-09-08 RX ADMIN — DOXYCYCLINE 100 MG: 25 FOR SUSPENSION ORAL at 13:31

## 2020-09-08 RX ADMIN — FENTANYL CITRATE 50 MCG: 50 INJECTION, SOLUTION INTRAMUSCULAR; INTRAVENOUS at 08:51

## 2020-09-08 RX ADMIN — FUROSEMIDE 20 MG: 10 INJECTION, SOLUTION INTRAMUSCULAR; INTRAVENOUS at 12:20

## 2020-09-08 RX ADMIN — PROPOFOL 30 MCG/KG/MIN: 10 INJECTION, EMULSION INTRAVENOUS at 08:52

## 2020-09-08 RX ADMIN — DEXAMETHASONE SODIUM PHOSPHATE 2 MG: 4 INJECTION, SOLUTION INTRAMUSCULAR; INTRAVENOUS at 03:26

## 2020-09-08 RX ADMIN — CEFTRIAXONE 1 G: 1 INJECTION, POWDER, FOR SOLUTION INTRAMUSCULAR; INTRAVENOUS at 23:16

## 2020-09-08 RX ADMIN — FAMOTIDINE 20 MG: 10 INJECTION INTRAVENOUS at 08:42

## 2020-09-08 RX ADMIN — BUDESONIDE AND FORMOTEROL FUMARATE DIHYDRATE 2 PUFF: 160; 4.5 AEROSOL RESPIRATORY (INHALATION) at 07:21

## 2020-09-08 NOTE — PLAN OF CARE
Problem: Patient Care Overview  Goal: Plan of Care Review  Outcome: Ongoing (interventions implemented as appropriate)  Flowsheets (Taken 9/8/2020 8972)  Progress: no change  Plan of Care Reviewed With: grandchild(sabrina)  Outcome Summary: Pt remains sedated on vent; PRN versed and fentanyl given as needed for agitation; Eulogio, Son and granddaughter called for update, updated given; Granddaughter at bedside, all question answered; VSS; IV azithromycin stopped today, PO doxycline ordered; CT head ordered, pending at this time; will continue to monitor

## 2020-09-08 NOTE — PLAN OF CARE
Problem: Nutrition, Enteral (Adult)  Goal: Signs and Symptoms of Listed Potential Problems Will be Absent, Minimized or Managed (Nutrition, Enteral)  Outcome: Ongoing (interventions implemented as appropriate)  Flowsheets (Taken 9/8/2020 1239)  Problems Assessed (Enteral Nutrition): all  Problems Present (Enteral Nutrition): none     Problem: Patient Care Overview  Goal: Plan of Care Review  Outcome: Ongoing (interventions implemented as appropriate)  Flowsheets (Taken 9/8/2020 1238)  Progress: improving  Plan of Care Reviewed With: caregiver  Outcome Summary: Pt remains NPO on the vent.  Enteral nutrition started and currently at goal rate.  Will change formula to Peptamen AF due to protein needs currently not being met.

## 2020-09-08 NOTE — PROGRESS NOTES
Feng Valverde MD                          [phone]      Patient ID:    Name:  Theresa Esquivel    MRN:  [id number]    1955   64 y.o.  female            Patient Care Team:  Pat Montiel APRN as PCP - General (Family Medicine)    CC/ Reason for visit: Pneumonia, respiratory failure, ventilator management    Subjective: Pt seen and examined this AM.  Patient continues to have encephalopathy issues requiring PRN medications and propofol drip.  Started on Seroquel yesterday with not much response.  Requiring minimal ventilator settings.  SBT had to be discontinued due to agitation and not following commands.    ROS: Unable to obtain due to respiratory failure    Objective     Vital Signs past 24hrs    BP range: BP: ()/(40-80) 91/53  Pulse range: Heart Rate:  [64-97] 79  Resp rate range: Resp:  [20-25] 23  Temp range: Temp (24hrs), Av.7 °F (36.5 °C), Min:96.6 °F (35.9 °C), Max:98.1 °F (36.7 °C)      Ventilator/Non-Invasive Ventilation Settings (From admission, onward)     Start     Ordered    20 0312  Ventilator - AC/VC+; 20; 90%; 5; 500  Continuous     Question Answer Comment   Vent Mode AC/VC+    Breath rate 20    FiO2 titrate for Sp02% =/> 90%    PEEP 5    Tidal Volume 500        20 0313                Device (Oxygen Therapy): ventilator FiO2 (%): 35 %     79 kg (174 lb 2.6 oz); Body mass index is 29.9 kg/m².      Intake/Output Summary (Last 24 hours) at 2020 1136  Last data filed at 2020 0800  Gross per 24 hour   Intake 3321.19 ml   Output 2095 ml   Net 1226.19 ml       PHYSICAL EXAM   Constitutional: Middle aged pt in bed, sedated on the mechanical ventilator  Head: - NCAT  Eyes: No pallor, Anicteric conjunctiva, EOMI.  ENMT:  ETT+, no oral thrush. Moist MM.   NECK: Trachea midline, No thyromegaly, no palpable cervical LNpathy  Heart: RRR, no murmur. No pedal edema   Lungs: ANGLE +, No wheezes/ crackles heard    Abdomen: Soft. No  tenderness, guarding or rigidity. No palpable masses  Extremities: Extremities warm and well perfused. No cyanosis/ clubbing  Neuro: Sedated, no gross focal neuro deficits  Psych: Mood and affect UTO    Scheduled meds:      albuterol sulfate HFA 2 puff Inhalation 4x Daily - RT   azithromycin 500 mg Intravenous Q24H   budesonide-formoterol 2 puff Inhalation BID - RT   cefTRIAXone 1 g Intravenous Q24H   dexamethasone 2 mg Intravenous Q8H   enoxaparin 40 mg Subcutaneous Q12H   famotidine 20 mg Intravenous Daily   furosemide 20 mg Intravenous Daily   insulin aspart 0-7 Units Subcutaneous TID AC   lisinopril 10 mg Oral Daily   QUEtiapine 50 mg Oral Q8H   sodium chloride 10 mL Intravenous Q12H       IV meds:                          propofol 5-50 mcg/kg/min Last Rate: 20 mcg/kg/min (09/08/20 1030)       Data Review:      Results from last 7 days   Lab Units 09/08/20  0546 09/08/20  0509 09/07/20  2106 09/07/20  0422 09/06/20  0239  09/04/20  2316   SODIUM mmol/L 137  --   --  138 141   < > 138   POTASSIUM mmol/L 4.0  --  4.4 3.1* 4.1   < > 3.6   CHLORIDE mmol/L 104  --   --  105 106   < > 95*   CO2 mmol/L 23.0  --   --  24.0 23.0   < > 18.0*   BUN mg/dL 25*  --   --  21 15   < > 10   CREATININE mg/dL 0.74  --   --  0.82 0.87   < > 1.13*   CALCIUM mg/dL 8.8  --   --  8.3* 9.3   < > 9.4   BILIRUBIN mg/dL  --   --   --   --   --   --  0.5   ALK PHOS U/L  --   --   --   --   --   --  115   ALT (SGPT) U/L  --   --   --   --   --   --  22   AST (SGOT) U/L  --   --   --   --   --   --  30   GLUCOSE mg/dL 153*  --   --  109* 141*   < > 234*   WBC 10*3/mm3  --  12.13*  --  10.86* 17.89*   < > 15.90*   HEMOGLOBIN g/dL  --  14.7  --  13.6 15.6   < > 16.2*   PLATELETS 10*3/mm3  --  272  --  275 296   < > 363   PROBNP pg/mL  --   --   --   --   --   --  146.8   PROCALCITONIN ng/mL  --   --   --   --  0.97*  --   --     < > = values in this interval not displayed.       Lab Results   Component Value Date    CALCIUM 8.8 09/08/2020     PHOS 3.3 09/08/2020       Results from last 7 days   Lab Units 09/05/20  1611 09/05/20  0531 09/05/20  0046   BLOODCX   --  No growth at 3 days No growth at 3 days   RESPCX  Scant growth (1+) Normal Respiratory Cele: NO S.aureus/MRSA or Pseudomonas aeruginosa  --   --        Results from last 7 days   Lab Units 09/08/20  0650 09/05/20  0453 09/05/20  0008   PH, ARTERIAL pH units 7.456* 7.385 7.318*   PO2 ART mm Hg 77.3* 92.5 176.0*   PCO2, ARTERIAL mm Hg 35.4 38.4 45.3*   HCO3 ART mmol/L 24.9 23.0 23.2        Results Review:    I have reviewed the relevant laboratory results and independently reviewed the chest imaging from this hospitalization including the available echocardiogram reports personally and summarized it if/ when appropriate below    Assessment    Acute hypoxic/hypercapnic respiratory failure s/p mechanical ventilation 9/5   Persistent metabolic encephalopathy  Post extubation stridor - needing reintubation 9/7  Bibasilar pneumonia  Sepsis  Acute on chronic congestive heart failure  COVID-19 negative x2  Prolonged Qtc  Mild lactic acidosis  Suspected drug abuse  Remote breast cancer  Chronic pain syndrome on narcotics  Remote tobacco abuse    PLAN:  Patient remains on the mechanical ventilator low ventilator support.  Had to be reintubated for postextubation stridor.  Ongoing encephalopathy is of bigger concern likely multifactorial from pneumonia/drug abuse and baseline psychiatric issues reported per granddaughter.  We will continue with current ventilator support and Seroquel and give her some more time.  Continue with steroids for post extubation stridor.  Will need to factor steroid-induced psychosis as well and hence we will plan to stop soon.  Continue with empiric antibiotics and spot diuresis to keep her negative.  UDS shows narcotics only.  Granddaughter unaware about her substance abuse charges.  Will DC azithro and switch to doxy due to prolonged qtc. Might need to dc seroquel.     Full  code    DVT/GI prophylaxis    CC- 35 mins    I have discussed my findings and recommendations with patient, nursing staff and primary care team.     Feng Valverde MD  9/8/2020

## 2020-09-08 NOTE — PLAN OF CARE
Problem: Patient Care Overview  Goal: Plan of Care Review  Outcome: Ongoing (interventions implemented as appropriate)  Flowsheets (Taken 9/8/2020 5768)  Progress: no change  Outcome Summary: pt failed SAT/ SBT this AM. urine output barely adequate. pt vital signs stable. will continue to monitor.

## 2020-09-08 NOTE — SIGNIFICANT NOTE
Pt was not able to follow commands, no purposeful movement, no withdraw from pain, blank stare, began thrashing body after 20 minutes, SBT terminated at that time.

## 2020-09-08 NOTE — PROGRESS NOTES
[facility] Medicine Services  INPATIENT PROGRESS NOTE    Length of Stay: 3  Date of Admission: 9/4/2020  Primary Care Physician: Pat Montiel APRN    Subjective   Chief Complaint: Respiratory failure.    HPI: Patient is seen for follow-up.  She is a 64-year-old female with history of nicotine dependence, chronic pain syndrome, chronic bronchitis, hypertension who was admitted for acute hypoxic/hypercapnic respiratory failure requiring endotracheal intubation.    She remains intubated, sedated and restrained.    Review of Systems  Unable to review as the patient is intubated and sedated.  Objective    Temp:  [96.6 °F (35.9 °C)-98.1 °F (36.7 °C)] 96.6 °F (35.9 °C)  Heart Rate:  [64-97] 66  Resp:  [20-25] 20  BP: ()/(40-80) 91/53  FiO2 (%):  [35 %] 35 %    Vent Settings:  FiO2 (%):  [35 %] 35 %  S RR:  [20] 20  PEEP/CPAP (cm H2O):  [5 cm H20] 5 cm H20  TX SUP:  [0 cm H20] 0 cm H20  MAP (cm H2O):  [9.3-14] 11    Physical Exam   Constitutional: She appears well-developed and well-nourished. She is cooperative. No distress. She is sedated, intubated and restrained.   HENT:   Head: Normocephalic and atraumatic.   Right Ear: External ear normal.   Left Ear: External ear normal.   Nose: Nose normal.   Mouth/Throat: Oropharynx is clear and moist.   Eyes: Conjunctivae and EOM are normal.   Neck: Neck supple. No JVD present. No thyromegaly present.   Cardiovascular: Normal rate, regular rhythm, normal heart sounds and intact distal pulses. Exam reveals no gallop and no friction rub.   No murmur heard.  Pulmonary/Chest: Effort normal. No stridor. She is intubated. No respiratory distress. She has decreased breath sounds. She has no wheezes. She has rhonchi. She has no rales. She exhibits no tenderness.   Abdominal: Soft. Bowel sounds are normal. She exhibits no distension and no mass. There is no tenderness. There is no rebound and no guarding. No hernia.      Musculoskeletal: She exhibits no edema, tenderness or deformity.   Neurological: She has normal reflexes. She exhibits normal muscle tone.   Skin: Skin is warm and dry. No rash noted. She is not diaphoretic. No erythema. No pallor.   Nursing note and vitals reviewed.        Medication Review:    Current Facility-Administered Medications:   •  albuterol sulfate HFA (PROVENTIL HFA;VENTOLIN HFA;PROAIR HFA) inhaler 2 puff, 2 puff, Inhalation, 4x Daily - RT, Eulogio Gonzalez MD, 2 puff at 09/08/20 0721  •  AZITHROMYCIN 500 MG/250 ML 0.9% NS IVPB (vial-mate), 500 mg, Intravenous, Q24H, Dagoberto Crum MD, 500 mg at 09/07/20 2358  •  budesonide-formoterol (SYMBICORT) 160-4.5 MCG/ACT inhaler 2 puff, 2 puff, Inhalation, BID - RT, Eulogio Gonzalez MD, 2 puff at 09/08/20 0721  •  cefTRIAXone (ROCEPHIN) 1 g/100 mL 0.9% NS (MBP), 1 g, Intravenous, Q24H, Eulogio Gonzalez MD, 1 g at 09/07/20 2308  •  dexamethasone (DECADRON) injection 2 mg, 2 mg, Intravenous, Q8H, Feng Valverde MD, 2 mg at 09/08/20 0326  •  dextrose (D50W) 25 g/ 50mL Intravenous Solution 25 g, 25 g, Intravenous, Q15 Min PRN, Dagoberto Crum MD  •  dextrose (GLUTOSE) oral gel 15 g, 15 g, Oral, Q15 Min PRN, Dagoberto Crum MD  •  enalaprilat (VASOTEC) injection 1.25 mg, 1.25 mg, Intravenous, Q6H PRN, Eulogio Gonzalez MD  •  enoxaparin (LOVENOX) syringe 40 mg, 40 mg, Subcutaneous, Q12H, Dagoberto Crum MD, 40 mg at 09/08/20 0841  •  famotidine (PEPCID) injection 20 mg, 20 mg, Intravenous, Daily, Eulogio Gonzalez MD, 20 mg at 09/08/20 0842  •  fentaNYL citrate (PF) (SUBLIMAZE) injection 50 mcg, 50 mcg, Intravenous, Q1H PRN, Feng Valverde MD, 50 mcg at 09/08/20 0851  •  glucagon (human recombinant) (GLUCAGEN DIAGNOSTIC) injection 1 mg, 1 mg, Subcutaneous, Q15 Min PRN, Dagoberto Crum MD  •  insulin aspart (novoLOG) injection 0-7 Units, 0-7 Units, Subcutaneous, TID AC, Dagoberto Crum MD, 2 Units at 09/08/20  0640  •  lisinopril (PRINIVIL,ZESTRIL) tablet 10 mg, 10 mg, Oral, Daily, Eulogio Gonzalez MD, 10 mg at 09/07/20 0810  •  midazolam (VERSED) injection 2 mg, 2 mg, Intravenous, Q1H PRN, Feng Valverde MD, 2 mg at 09/08/20 0525  •  potassium chloride (KLOR-CON) packet 40 mEq, 40 mEq, Oral, PRN, Preet Chaidez, DO, 40 mEq at 09/07/20 1712  •  propofol (DIPRIVAN) infusion 10 mg/mL 100 mL, 5-50 mcg/kg/min, Intravenous, Titrated, Raghav Lopez MD, Last Rate: 10.56 mL/hr at 09/08/20 1030, 20 mcg/kg/min at 09/08/20 1030  •  QUEtiapine (SEROquel) tablet 50 mg, 50 mg, Oral, Q8H, Feng Valverde MD, 50 mg at 09/08/20 0524  •  sodium chloride 0.9 % flush 10 mL, 10 mL, Intravenous, PRN, Raghav Lopez MD, 10 mL at 09/05/20 2149  •  sodium chloride 0.9 % flush 10 mL, 10 mL, Intravenous, Q12H, Dagoberto Crum MD, 10 mL at 09/08/20 0843  •  sodium chloride 0.9 % flush 10 mL, 10 mL, Intravenous, PRN, Dagoberto Crum MD    Results Review:  I have reviewed the labs, radiology results, and diagnostic studies.    Laboratory Data:   Results from last 7 days   Lab Units 09/08/20  0546 09/07/20  2106 09/07/20  0422 09/06/20  0239  09/04/20  2316   SODIUM mmol/L 137  --  138 141   < > 138   POTASSIUM mmol/L 4.0 4.4 3.1* 4.1   < > 3.6   CHLORIDE mmol/L 104  --  105 106   < > 95*   CO2 mmol/L 23.0  --  24.0 23.0   < > 18.0*   BUN mg/dL 25*  --  21 15   < > 10   CREATININE mg/dL 0.74  --  0.82 0.87   < > 1.13*   GLUCOSE mg/dL 153*  --  109* 141*   < > 234*   CALCIUM mg/dL 8.8  --  8.3* 9.3   < > 9.4   BILIRUBIN mg/dL  --   --   --   --   --  0.5   ALK PHOS U/L  --   --   --   --   --  115   ALT (SGPT) U/L  --   --   --   --   --  22   AST (SGOT) U/L  --   --   --   --   --  30   ANION GAP mmol/L 10.0  --  9.0 12.0   < > 25.0*    < > = values in this interval not displayed.     Estimated Creatinine Clearance: 78.1 mL/min (by C-G formula based on SCr of 0.74 mg/dL).  Results from last 7 days   Lab Units  09/08/20  0546 09/07/20  0422 09/06/20  0239   MAGNESIUM mg/dL 2.3 1.9 2.2   PHOSPHORUS mg/dL 3.3 3.7 3.4         Results from last 7 days   Lab Units 09/08/20  0509 09/07/20  0422 09/06/20  0239 09/05/20  0532 09/04/20  2316   WBC 10*3/mm3 12.13* 10.86* 17.89* 11.68* 15.90*   HEMOGLOBIN g/dL 14.7 13.6 15.6 14.6 16.2*   HEMATOCRIT % 43.6 39.6 46.8* 43.2 49.9*   PLATELETS 10*3/mm3 272 275 296 297 363           Culture Data:   No results found for: BLOODCX  No results found for: URINECX  Respiratory Culture   Date Value Ref Range Status   09/05/2020   Final    Scant growth (1+) Normal Respiratory Cele: NO S.aureus/MRSA or Pseudomonas aeruginosa     No results found for: WOUNDCX  No results found for: STOOLCX  No components found for: BODYFLD    Radiology Data:   Imaging Results (Last 24 Hours)     ** No results found for the last 24 hours. **          ABG:  Results from last 7 days   Lab Units 09/08/20  0650   PH, ARTERIAL pH units 7.456*   PO2 ART mm Hg 77.3*   PCO2, ARTERIAL mm Hg 35.4   HCO3 ART mmol/L 24.9       I have reviewed the patient's current medications.     Assessment/Plan     Hospital Problem List:  Active Problems:    Respiratory failure with hypoxia and hypercapnia (CMS/HCC)    Acute respiratory failure with hypoxia and hypercapnia (secondary to bilateral pneumonia to rule out COVID-19 viral infection): Continue ventilatory support, IV antibiotics and inhalers.  Blood cultures, respiratory cultures showed no growth and COVID-19 PCR were negative x2.  Leukocytosis is reactive and much improved.  Chest x-ray done 9/7/2020 showed much improvement.  Input by pulmonary is appreciated.    Sepsis secondary to bilateral pneumonia (to rule out COVID-19 viral infection): Continue IV antibiotics.   Blood cultures have shown no growth.  Lactic acidosis has resolved.     Azotemia: Resolved.    Elevated d-dimer is likely due to acute illness and CT pulmonary angiogram is negative for pulmonary  embolism.    Hyperglycemia is likely due to acute illness as patient is not a known diabetic.  Hemoglobin A1c is 5.90.  Continue Accu-Cheks and sliding scale insulin.    Hypertension: Stable. Continue lisinopril.      Nicotine dependence: Continue nicotine patch.  Patient will receive counseling postextubation.    Nutrition: Continue  NG tube feeding today.  Dietitian is following.      Continue GI and DVT prophylaxis.    Update was given to her daughter Connie Collins.    Discharge Planning: In progress.    Eulogio Gonzalez MD   09/08/20   10:53

## 2020-09-08 NOTE — CONSULTS
Adult Nutrition  Assessment    Patient Name:  Theresa Esquivel  YOB: 1955  MRN: [id number]  Admit Date:  9/4/2020    Assessment Date:  9/8/2020    Comments:  Pt remains NPO on the vent.  Of note--pt was extubated yesterday and was re-intubated due to stridor.  Enteral nutrition started and currently at goal rate.  Still being managed for Acute Resp Failure due to bilateral pneumonia with Sepsis.  On Decadron with blood sugars covered by SSI.  SBT terminated this am due to pt not following commands and thrashing body.  Will change formula to better meet protein needs.  Peptamen AF with goal rate of 45cc/hr with propofol rate now at 9cc/hr.  Will monitor.     Reason for Assessment     Row Name 09/08/20 1232          Reason for Assessment    Reason For Assessment  follow-up protocol         Nutrition/Diet History     Row Name 09/08/20 1232          Nutrition/Diet History    Typical Food/Fluid Intake  Pt will be at goal rate by this evening         Anthropometrics     Row Name 09/08/20 0500          Anthropometrics    Weight  79 kg (174 lb 2.6 oz)         Labs/Tests/Procedures/Meds     Row Name 09/08/20 1233          Labs/Procedures/Meds    Lab Results Reviewed  reviewed, pertinent     Lab Results Comments  Gluc 153; Bun 25        Diagnostic Tests/Procedures    Diagnostic Test/Procedure Reviewed  reviewed, pertinent     Diagnostic Test/Procedures Comments  Vent management        Medications    Pertinent Medications Reviewed  reviewed, pertinent     Pertinent Medications Comments  Abx; Decadron; SSI; Propofol         Physical Findings     Row Name 09/08/20 1233          Physical Findings    Overall Physical Appearance  on ventilator support     Tubes  nasoduodenal tube           Nutrition Prescription Ordered     Row Name 09/08/20 1233          Nutrition Prescription PO    Current PO Diet  NPO        Nutrition Prescription EN    Enteral Route  ND     Product  Diabetisource AC (Glucerna 1.2)     TF Delivery  Method  Continuous     Continuous TF Goal Rate (mL/hr)  45 mL/hr     Continuous TF Current Rate (mL/hr)  45 mL/hr     Water flush (mL)   25 mL     Water Flush Frequency  Per hour        Propofol Considerations    Propofol (mL/hr)  9 mL/hr     Propofol (Kcal/day)  238 Kcal/day         Evaluation of Received Nutrient/Fluid Intake     Row Name 09/08/20 1234          Calories Evaluation    Enteral Calories (kcal)  1296     Other Calories (kcal)  238     Total Calories (kcal)  1534     % of Kcal Needs  100        Protein Evaluation    Enteral Protein (gm)  65     % of Protein Needs  89               Electronically signed by:  Sabina Messer RD  09/08/20 12:45

## 2020-09-09 LAB
ANION GAP SERPL CALCULATED.3IONS-SCNC: 9 MMOL/L (ref 5–15)
BASOPHILS # BLD AUTO: 0.01 10*3/MM3 (ref 0–0.2)
BASOPHILS NFR BLD AUTO: 0.1 % (ref 0–1.5)
BUN SERPL-MCNC: 32 MG/DL (ref 8–23)
BUN/CREAT SERPL: 45.1 (ref 7–25)
CALCIUM SPEC-SCNC: 8.7 MG/DL (ref 8.6–10.5)
CHLORIDE SERPL-SCNC: 105 MMOL/L (ref 98–107)
CO2 SERPL-SCNC: 23 MMOL/L (ref 22–29)
CREAT SERPL-MCNC: 0.71 MG/DL (ref 0.57–1)
DEPRECATED RDW RBC AUTO: 43.5 FL (ref 37–54)
EOSINOPHIL # BLD AUTO: 0.02 10*3/MM3 (ref 0–0.4)
EOSINOPHIL NFR BLD AUTO: 0.2 % (ref 0.3–6.2)
ERYTHROCYTE [DISTWIDTH] IN BLOOD BY AUTOMATED COUNT: 13.2 % (ref 12.3–15.4)
GFR SERPL CREATININE-BSD FRML MDRD: 83 ML/MIN/1.73
GLUCOSE BLDC GLUCOMTR-MCNC: 108 MG/DL (ref 70–130)
GLUCOSE BLDC GLUCOMTR-MCNC: 87 MG/DL (ref 70–130)
GLUCOSE SERPL-MCNC: 112 MG/DL (ref 65–99)
HCT VFR BLD AUTO: 40 % (ref 34–46.6)
HGB BLD-MCNC: 13.3 G/DL (ref 12–15.9)
IMM GRANULOCYTES # BLD AUTO: 0.11 10*3/MM3 (ref 0–0.05)
IMM GRANULOCYTES NFR BLD AUTO: 1.2 % (ref 0–0.5)
LYMPHOCYTES # BLD AUTO: 2.64 10*3/MM3 (ref 0.7–3.1)
LYMPHOCYTES NFR BLD AUTO: 27.9 % (ref 19.6–45.3)
MAGNESIUM SERPL-MCNC: 2.4 MG/DL (ref 1.6–2.4)
MCH RBC QN AUTO: 30 PG (ref 26.6–33)
MCHC RBC AUTO-ENTMCNC: 33.3 G/DL (ref 31.5–35.7)
MCV RBC AUTO: 90.3 FL (ref 79–97)
MONOCYTES # BLD AUTO: 0.75 10*3/MM3 (ref 0.1–0.9)
MONOCYTES NFR BLD AUTO: 7.9 % (ref 5–12)
NEUTROPHILS NFR BLD AUTO: 5.94 10*3/MM3 (ref 1.7–7)
NEUTROPHILS NFR BLD AUTO: 62.7 % (ref 42.7–76)
NRBC BLD AUTO-RTO: 0 /100 WBC (ref 0–0.2)
PHOSPHATE SERPL-MCNC: 3 MG/DL (ref 2.5–4.5)
PLATELET # BLD AUTO: 296 10*3/MM3 (ref 140–450)
PMV BLD AUTO: 10.7 FL (ref 6–12)
POTASSIUM SERPL-SCNC: 3.9 MMOL/L (ref 3.5–5.2)
RBC # BLD AUTO: 4.43 10*6/MM3 (ref 3.77–5.28)
SODIUM SERPL-SCNC: 137 MMOL/L (ref 136–145)
WBC # BLD AUTO: 9.47 10*3/MM3 (ref 3.4–10.8)

## 2020-09-09 PROCEDURE — 82962 GLUCOSE BLOOD TEST: CPT

## 2020-09-09 PROCEDURE — 25010000002 PROPOFOL 10 MG/ML EMULSION: Performed by: EMERGENCY MEDICINE

## 2020-09-09 PROCEDURE — 63710000001 INSULIN ASPART PER 5 UNITS: Performed by: INTERNAL MEDICINE

## 2020-09-09 PROCEDURE — 25010000002 ENOXAPARIN PER 10 MG: Performed by: INTERNAL MEDICINE

## 2020-09-09 PROCEDURE — 25010000002 FENTANYL CITRATE (PF) 100 MCG/2ML SOLUTION: Performed by: INTERNAL MEDICINE

## 2020-09-09 PROCEDURE — 80048 BASIC METABOLIC PNL TOTAL CA: CPT | Performed by: INTERNAL MEDICINE

## 2020-09-09 PROCEDURE — 83735 ASSAY OF MAGNESIUM: CPT | Performed by: INTERNAL MEDICINE

## 2020-09-09 PROCEDURE — 94799 UNLISTED PULMONARY SVC/PX: CPT

## 2020-09-09 PROCEDURE — 94003 VENT MGMT INPAT SUBQ DAY: CPT

## 2020-09-09 PROCEDURE — 92610 EVALUATE SWALLOWING FUNCTION: CPT | Performed by: SPEECH-LANGUAGE PATHOLOGIST

## 2020-09-09 PROCEDURE — 25010000002 MIDAZOLAM PER 1 MG: Performed by: INTERNAL MEDICINE

## 2020-09-09 PROCEDURE — 25010000002 FUROSEMIDE PER 20 MG: Performed by: INTERNAL MEDICINE

## 2020-09-09 PROCEDURE — 84100 ASSAY OF PHOSPHORUS: CPT | Performed by: INTERNAL MEDICINE

## 2020-09-09 PROCEDURE — 93005 ELECTROCARDIOGRAM TRACING: CPT | Performed by: INTERNAL MEDICINE

## 2020-09-09 PROCEDURE — 85025 COMPLETE CBC W/AUTO DIFF WBC: CPT | Performed by: INTERNAL MEDICINE

## 2020-09-09 PROCEDURE — 94640 AIRWAY INHALATION TREATMENT: CPT

## 2020-09-09 PROCEDURE — 99233 SBSQ HOSP IP/OBS HIGH 50: CPT | Performed by: INTERNAL MEDICINE

## 2020-09-09 PROCEDURE — 93010 ELECTROCARDIOGRAM REPORT: CPT | Performed by: INTERNAL MEDICINE

## 2020-09-09 PROCEDURE — 94760 N-INVAS EAR/PLS OXIMETRY 1: CPT

## 2020-09-09 RX ORDER — ACETAMINOPHEN 325 MG/1
650 TABLET ORAL EVERY 6 HOURS PRN
Status: DISCONTINUED | OUTPATIENT
Start: 2020-09-09 | End: 2020-09-12 | Stop reason: HOSPADM

## 2020-09-09 RX ORDER — BUDESONIDE 0.5 MG/2ML
0.5 INHALANT ORAL
Status: DISCONTINUED | OUTPATIENT
Start: 2020-09-09 | End: 2020-09-12 | Stop reason: HOSPADM

## 2020-09-09 RX ORDER — IPRATROPIUM BROMIDE AND ALBUTEROL SULFATE 2.5; .5 MG/3ML; MG/3ML
3 SOLUTION RESPIRATORY (INHALATION)
Status: DISCONTINUED | OUTPATIENT
Start: 2020-09-09 | End: 2020-09-12 | Stop reason: HOSPADM

## 2020-09-09 RX ORDER — DOXYCYCLINE 100 MG/1
100 CAPSULE ORAL EVERY 12 HOURS SCHEDULED
Status: DISCONTINUED | OUTPATIENT
Start: 2020-09-09 | End: 2020-09-12 | Stop reason: HOSPADM

## 2020-09-09 RX ADMIN — MIDAZOLAM HYDROCHLORIDE 2 MG: 2 INJECTION, SOLUTION INTRAMUSCULAR; INTRAVENOUS at 05:28

## 2020-09-09 RX ADMIN — PROPOFOL 45 MCG/KG/MIN: 10 INJECTION, EMULSION INTRAVENOUS at 06:09

## 2020-09-09 RX ADMIN — BUDESONIDE AND FORMOTEROL FUMARATE DIHYDRATE 2 PUFF: 160; 4.5 AEROSOL RESPIRATORY (INHALATION) at 07:07

## 2020-09-09 RX ADMIN — MIDAZOLAM HYDROCHLORIDE 2 MG: 2 INJECTION, SOLUTION INTRAMUSCULAR; INTRAVENOUS at 08:01

## 2020-09-09 RX ADMIN — FUROSEMIDE 20 MG: 10 INJECTION, SOLUTION INTRAMUSCULAR; INTRAVENOUS at 08:02

## 2020-09-09 RX ADMIN — QUETIAPINE FUMARATE 50 MG: 25 TABLET ORAL at 21:14

## 2020-09-09 RX ADMIN — MIDAZOLAM HYDROCHLORIDE 2 MG: 2 INJECTION, SOLUTION INTRAMUSCULAR; INTRAVENOUS at 01:41

## 2020-09-09 RX ADMIN — INSULIN ASPART 2 UNITS: 100 INJECTION, SOLUTION INTRAVENOUS; SUBCUTANEOUS at 18:12

## 2020-09-09 RX ADMIN — MIDAZOLAM HYDROCHLORIDE 2 MG: 2 INJECTION, SOLUTION INTRAMUSCULAR; INTRAVENOUS at 03:26

## 2020-09-09 RX ADMIN — FENTANYL CITRATE 50 MCG: 50 INJECTION, SOLUTION INTRAMUSCULAR; INTRAVENOUS at 07:44

## 2020-09-09 RX ADMIN — DOXYCYCLINE 100 MG: 100 CAPSULE ORAL at 20:47

## 2020-09-09 RX ADMIN — SODIUM CHLORIDE, PRESERVATIVE FREE 10 ML: 5 INJECTION INTRAVENOUS at 08:02

## 2020-09-09 RX ADMIN — QUETIAPINE FUMARATE 50 MG: 25 TABLET ORAL at 05:04

## 2020-09-09 RX ADMIN — BUDESONIDE 0.5 MG: 0.5 INHALANT RESPIRATORY (INHALATION) at 19:27

## 2020-09-09 RX ADMIN — IPRATROPIUM BROMIDE AND ALBUTEROL SULFATE 3 ML: 2.5; .5 SOLUTION RESPIRATORY (INHALATION) at 19:27

## 2020-09-09 RX ADMIN — IPRATROPIUM BROMIDE AND ALBUTEROL SULFATE 3 ML: 2.5; .5 SOLUTION RESPIRATORY (INHALATION) at 15:15

## 2020-09-09 RX ADMIN — ENOXAPARIN SODIUM 40 MG: 40 INJECTION SUBCUTANEOUS at 08:02

## 2020-09-09 RX ADMIN — SODIUM CHLORIDE, PRESERVATIVE FREE 10 ML: 5 INJECTION INTRAVENOUS at 20:47

## 2020-09-09 RX ADMIN — ACETAMINOPHEN 650 MG: 325 TABLET, FILM COATED ORAL at 12:45

## 2020-09-09 RX ADMIN — FAMOTIDINE 20 MG: 10 INJECTION INTRAVENOUS at 08:01

## 2020-09-09 RX ADMIN — ENOXAPARIN SODIUM 40 MG: 40 INJECTION SUBCUTANEOUS at 20:46

## 2020-09-09 RX ADMIN — QUETIAPINE FUMARATE 50 MG: 25 TABLET ORAL at 14:05

## 2020-09-09 RX ADMIN — PROPOFOL 45 MCG/KG/MIN: 10 INJECTION, EMULSION INTRAVENOUS at 01:33

## 2020-09-09 RX ADMIN — ALBUTEROL SULFATE 2 PUFF: 90 AEROSOL, METERED RESPIRATORY (INHALATION) at 07:07

## 2020-09-09 RX ADMIN — DOXYCYCLINE 100 MG: 25 FOR SUSPENSION ORAL at 08:02

## 2020-09-09 NOTE — PROGRESS NOTES
Feng Valverde MD                          [phone]      Patient ID:    Name:  Theresa Esquivel    MRN:  [id number]    1955   64 y.o.  female            Patient Care Team:  Pat Montiel APRN as PCP - General (Family Medicine)    CC/ Reason for visit: Pneumonia, respiratory failure, ventilator management    Subjective: Pt seen and examined this AM.  Patient continues to have encephalopathy issues requiring PRN medications and propofol drip.  Started on Seroquel. Qtc improved after holding azithro.  Requiring no change in minimal ventilator settings.  SBT had to be discontinued due to agitation and not following commands but d/w RN to try again now.     ROS: Unable to obtain due to respiratory failure    Objective     Vital Signs past 24hrs    BP range: BP: ()/(42-78) 143/73  Pulse range: Heart Rate:  [54-97] 93  Resp rate range: Resp:  [18-22] 18  Temp range: Temp (24hrs), Av.8 °F (36.6 °C), Min:97 °F (36.1 °C), Max:98.5 °F (36.9 °C)      Ventilator/Non-Invasive Ventilation Settings (From admission, onward)     Start     Ordered    20 0312  Ventilator - AC/VC+; 20; 90%; 5; 500  Continuous     Question Answer Comment   Vent Mode AC/VC+    Breath rate 20    FiO2 titrate for Sp02% =/> 90%    PEEP 5    Tidal Volume 500        20 0313                Device (Oxygen Therapy): nasal cannula FiO2 (%): 35 %     81.8 kg (180 lb 5.4 oz); Body mass index is 30.95 kg/m².      Intake/Output Summary (Last 24 hours) at 2020 1351  Last data filed at 2020 1100  Gross per 24 hour   Intake 1990.8 ml   Output 2930 ml   Net -939.2 ml       PHYSICAL EXAM   Constitutional: Middle aged pt in bed, sedated on the mechanical ventilator  Head: - NCAT  Eyes: No pallor, Anicteric conjunctiva, EOMI.  ENMT:  ETT+, no oral thrush. Moist MM.   NECK: Trachea midline, No thyromegaly, no palpable cervical LNpathy  Heart: RRR, no murmur. No pedal edema   Lungs: ANGLE +,  No wheezes/ crackles heard    Abdomen: Soft. No tenderness, guarding or rigidity. No palpable masses  Extremities: Extremities warm and well perfused. No cyanosis/ clubbing  Neuro: Sedated, no gross focal neuro deficits  Psych: Mood and affect UTO    Scheduled meds:      albuterol sulfate HFA 2 puff Inhalation 4x Daily - RT   budesonide-formoterol 2 puff Inhalation BID - RT   cefTRIAXone 1 g Intravenous Q24H   doxycycline 100 mg Per G Tube Q12H   enoxaparin 40 mg Subcutaneous Q12H   famotidine 20 mg Intravenous Daily   furosemide 20 mg Intravenous Daily   insulin aspart 0-7 Units Subcutaneous TID AC   lisinopril 10 mg Oral Daily   QUEtiapine 50 mg Oral Q8H   sodium chloride 10 mL Intravenous Q12H       IV meds:                             Data Review:      Results from last 7 days   Lab Units 09/09/20  0536 09/08/20  0546 09/08/20  0509 09/07/20  2106 09/07/20  0422 09/06/20  0239  09/04/20  2316   SODIUM mmol/L 137 137  --   --  138 141   < > 138   POTASSIUM mmol/L 3.9 4.0  --  4.4 3.1* 4.1   < > 3.6   CHLORIDE mmol/L 105 104  --   --  105 106   < > 95*   CO2 mmol/L 23.0 23.0  --   --  24.0 23.0   < > 18.0*   BUN mg/dL 32* 25*  --   --  21 15   < > 10   CREATININE mg/dL 0.71 0.74  --   --  0.82 0.87   < > 1.13*   CALCIUM mg/dL 8.7 8.8  --   --  8.3* 9.3   < > 9.4   BILIRUBIN mg/dL  --   --   --   --   --   --   --  0.5   ALK PHOS U/L  --   --   --   --   --   --   --  115   ALT (SGPT) U/L  --   --   --   --   --   --   --  22   AST (SGOT) U/L  --   --   --   --   --   --   --  30   GLUCOSE mg/dL 112* 153*  --   --  109* 141*   < > 234*   WBC 10*3/mm3 9.47  --  12.13*  --  10.86* 17.89*   < > 15.90*   HEMOGLOBIN g/dL 13.3  --  14.7  --  13.6 15.6   < > 16.2*   PLATELETS 10*3/mm3 296  --  272  --  275 296   < > 363   PROBNP pg/mL  --   --   --   --   --   --   --  146.8   PROCALCITONIN ng/mL  --   --   --   --   --  0.97*  --   --     < > = values in this interval not displayed.       Lab Results   Component Value  Date    CALCIUM 8.7 09/09/2020    PHOS 3.0 09/09/2020       Results from last 7 days   Lab Units 09/05/20  1611 09/05/20  0531 09/05/20  0046   BLOODCX   --  No growth at 4 days No growth at 4 days   RESPCX  Scant growth (1+) Normal Respiratory Cele: NO S.aureus/MRSA or Pseudomonas aeruginosa  --   --        Results from last 7 days   Lab Units 09/08/20  0650 09/05/20  0453 09/05/20  0008   PH, ARTERIAL pH units 7.456* 7.385 7.318*   PO2 ART mm Hg 77.3* 92.5 176.0*   PCO2, ARTERIAL mm Hg 35.4 38.4 45.3*   HCO3 ART mmol/L 24.9 23.0 23.2        Results Review:    I have reviewed the relevant laboratory results and independently reviewed the chest imaging from this hospitalization including the available echocardiogram reports personally and summarized it if/ when appropriate below    Assessment    Acute hypoxic/hypercapnic respiratory failure s/p mechanical ventilation 9/5   Persistent metabolic encephalopathy  Post extubation stridor - needing reintubation 9/7  Bibasilar pneumonia  Sepsis  Acute on chronic congestive heart failure  COVID-19 negative x2  Prolonged Qtc  Mild lactic acidosis  Suspected drug abuse  Remote breast cancer  Chronic pain syndrome on narcotics  Remote tobacco abuse    PLAN:  Patient remains on the mechanical ventilator low ventilator support. Ongoing encephalopathy is of bigger concern likely multifactorial from pneumonia/drug abuse and baseline psychiatric issues reported per granddaughter. Will try SBT again now.  C/w Seroquel and watch Qtc.  Wean off steroids.   Continue with empiric antibiotics and spot diuresis to keep her negative.  Substance abuse issues and psych issues noted. Needs o/p f/u     Full code    DVT/GI prophylaxis    CC- 35 mins    I have discussed my findings and recommendations with patient, nursing staff and primary care team.     Feng Valverde MD  9/9/2020

## 2020-09-09 NOTE — PLAN OF CARE
Problem: Patient Care Overview  Goal: Plan of Care Review  Outcome: Ongoing (interventions implemented as appropriate)  Flowsheets  Taken 9/9/2020 1816 by Mitzi Cárdenas RN  Progress: improving  Outcome Summary: patient extubated today; pt alert and oriented and following commands; Razo removed; Will continue to monitor  Taken 9/9/2020 1402 by Pat Egan CCC-SLP  Plan of Care Reviewed With: patient

## 2020-09-09 NOTE — PROGRESS NOTES
[facility] Medicine Services  INPATIENT PROGRESS NOTE    Length of Stay: 4  Date of Admission: 9/4/2020  Primary Care Physician: Pat Montiel APRN    Subjective   Chief Complaint: Respiratory failure.    HPI: Patient is seen for follow-up.  She is a 64-year-old female with history of nicotine dependence, chronic pain syndrome, chronic bronchitis, hypertension who was admitted for acute hypoxic/hypercapnic respiratory failure requiring endotracheal intubation. Extubated 9/9/2020.   Complains of some soreness but no shortness of breath.    Review of Systems   Constitutional: Negative for chills, diaphoresis and fever.   HENT: Negative for sneezing and sore throat.    Eyes: Negative for pain and discharge.   Respiratory: Negative for cough and shortness of breath.    Gastrointestinal: Negative for constipation, diarrhea, nausea and vomiting.   Endocrine: Negative for cold intolerance and heat intolerance.   Genitourinary: Negative for difficulty urinating, dysuria, frequency and urgency.   Musculoskeletal: Negative for arthralgias and myalgias.   Skin: Negative for color change and pallor.   Allergic/Immunologic: Negative for environmental allergies and food allergies.   Neurological: Negative for dizziness, syncope and weakness.   Psychiatric/Behavioral: Negative for confusion and sleep disturbance.       Objective    Temp:  [97.2 °F (36.2 °C)-98.5 °F (36.9 °C)] 98 °F (36.7 °C)  Heart Rate:  [54-97] 93  Resp:  [18-22] 18  BP: ()/(42-78) 143/73  FiO2 (%):  [30 %-35 %] 35 %    Vent Settings:  FiO2 (%):  [30 %-35 %] 35 %  S RR:  [20] 20  PEEP/CPAP (cm H2O):  [5 cm H20] 5 cm H20  DE SUP:  [0 cm H20] 0 cm H20  MAP (cm H2O):  [11] 11    Physical Exam   Constitutional: She appears well-developed and well-nourished. She is cooperative. No distress.   HENT:   Head: Normocephalic and atraumatic.   Right Ear: External ear normal.   Left Ear: External ear normal.      Nose: Nose normal.   Mouth/Throat: Oropharynx is clear and moist.   Eyes: Conjunctivae and EOM are normal.   Neck: Neck supple. No JVD present. No thyromegaly present.   Cardiovascular: Normal rate, regular rhythm, normal heart sounds and intact distal pulses. Exam reveals no gallop and no friction rub.   No murmur heard.  Pulmonary/Chest: Effort normal. No stridor. No respiratory distress. She has decreased breath sounds. She has no wheezes. She has rhonchi. She has no rales. She exhibits no tenderness.   Abdominal: Soft. Bowel sounds are normal. She exhibits no distension and no mass. There is no tenderness. There is no rebound and no guarding. No hernia.   Musculoskeletal: She exhibits no edema, tenderness or deformity.   Neurological: She has normal reflexes. She exhibits normal muscle tone.   Skin: Skin is warm and dry. No rash noted. She is not diaphoretic. No erythema. No pallor.   Nursing note and vitals reviewed.        Medication Review:    Current Facility-Administered Medications:   •  acetaminophen (TYLENOL) tablet 650 mg, 650 mg, Oral, Q6H PRN, Pat Naranjo MD, 650 mg at 09/09/20 1245  •  budesonide (PULMICORT) nebulizer solution 0.5 mg, 0.5 mg, Nebulization, BID - RT, Feng Valverde MD  •  cefTRIAXone (ROCEPHIN) 1 g/100 mL 0.9% NS (MBP), 1 g, Intravenous, Q24H, Eulogio Gonzalez MD, 1 g at 09/08/20 2316  •  dextrose (D50W) 25 g/ 50mL Intravenous Solution 25 g, 25 g, Intravenous, Q15 Min PRN, Dagoberto Crum MD  •  dextrose (GLUTOSE) oral gel 15 g, 15 g, Oral, Q15 Min PRN, Dagoberto Crum MD  •  doxycycline (MONODOX) capsule 100 mg, 100 mg, Oral, Q12H, Pat Naranjo MD  •  enalaprilat (VASOTEC) injection 1.25 mg, 1.25 mg, Intravenous, Q6H PRN, Eulogio Gonzalez MD  •  enoxaparin (LOVENOX) syringe 40 mg, 40 mg, Subcutaneous, Q12H, Dagoberto Crum MD, 40 mg at 09/09/20 0802  •  famotidine (PEPCID) injection 20 mg, 20 mg, Intravenous, Daily, Eulogio Gonzalez,  MD, 20 mg at 09/09/20 0801  •  furosemide (LASIX) injection 20 mg, 20 mg, Intravenous, Daily, Feng Valverde MD, 20 mg at 09/09/20 0802  •  glucagon (human recombinant) (GLUCAGEN DIAGNOSTIC) injection 1 mg, 1 mg, Subcutaneous, Q15 Min PRN, Dagoberto Crum MD  •  insulin aspart (novoLOG) injection 0-7 Units, 0-7 Units, Subcutaneous, TID AC, Dagoberto Crum MD, 2 Units at 09/08/20 0640  •  ipratropium-albuterol (DUO-NEB) nebulizer solution 3 mL, 3 mL, Nebulization, 4x Daily - RT, Feng Valverde MD, 3 mL at 09/09/20 1515  •  lisinopril (PRINIVIL,ZESTRIL) tablet 10 mg, 10 mg, Oral, Daily, Eulogio Gonzalez MD, 10 mg at 09/07/20 0810  •  midazolam (VERSED) injection 2 mg, 2 mg, Intravenous, Q1H PRN, Feng Valverde MD, 2 mg at 09/09/20 0801  •  potassium chloride (KLOR-CON) packet 40 mEq, 40 mEq, Oral, PRN, Preet Chaidez DO, 40 mEq at 09/07/20 1712  •  QUEtiapine (SEROquel) tablet 50 mg, 50 mg, Oral, Q8H, Feng Valverde MD, 50 mg at 09/09/20 1405  •  sodium chloride 0.9 % flush 10 mL, 10 mL, Intravenous, PRN, Raghav Lopez MD, 10 mL at 09/05/20 2149  •  sodium chloride 0.9 % flush 10 mL, 10 mL, Intravenous, Q12H, Dagoberto Crum MD, 10 mL at 09/09/20 0802  •  sodium chloride 0.9 % flush 10 mL, 10 mL, Intravenous, PRN, Dagoberto Crum MD    Results Review:  I have reviewed the labs, radiology results, and diagnostic studies.    Laboratory Data:   Results from last 7 days   Lab Units 09/09/20  0536 09/08/20  0546 09/07/20  2106 09/07/20  0422  09/04/20  2316   SODIUM mmol/L 137 137  --  138   < > 138   POTASSIUM mmol/L 3.9 4.0 4.4 3.1*   < > 3.6   CHLORIDE mmol/L 105 104  --  105   < > 95*   CO2 mmol/L 23.0 23.0  --  24.0   < > 18.0*   BUN mg/dL 32* 25*  --  21   < > 10   CREATININE mg/dL 0.71 0.74  --  0.82   < > 1.13*   GLUCOSE mg/dL 112* 153*  --  109*   < > 234*   CALCIUM mg/dL 8.7 8.8  --  8.3*   < > 9.4   BILIRUBIN mg/dL  --   --   --   --   --  0.5    ALK PHOS U/L  --   --   --   --   --  115   ALT (SGPT) U/L  --   --   --   --   --  22   AST (SGOT) U/L  --   --   --   --   --  30   ANION GAP mmol/L 9.0 10.0  --  9.0   < > 25.0*    < > = values in this interval not displayed.     Estimated Creatinine Clearance: 82.8 mL/min (by C-G formula based on SCr of 0.71 mg/dL).  Results from last 7 days   Lab Units 09/09/20  0536 09/08/20  0546 09/07/20  0422   MAGNESIUM mg/dL 2.4 2.3 1.9   PHOSPHORUS mg/dL 3.0 3.3 3.7         Results from last 7 days   Lab Units 09/09/20  0536 09/08/20  0509 09/07/20  0422 09/06/20  0239 09/05/20  0532   WBC 10*3/mm3 9.47 12.13* 10.86* 17.89* 11.68*   HEMOGLOBIN g/dL 13.3 14.7 13.6 15.6 14.6   HEMATOCRIT % 40.0 43.6 39.6 46.8* 43.2   PLATELETS 10*3/mm3 296 272 275 296 297           Culture Data:   No results found for: BLOODCX  No results found for: URINECX  No results found for: RESPCX  No results found for: WOUNDCX  No results found for: STOOLCX  No components found for: BODYFLD    Radiology Data:   Imaging Results (Last 24 Hours)     Procedure Component Value Units Date/Time    CT Head Without Contrast [[id number]] Collected:  09/08/20 1746     Updated:  09/08/20 1805    Narrative:         CT Head Without Contrast    History: Decreased alertness. Respiratory failure. Hypoxia.  Pneumonia.    Axial scans of the brain were obtained without intravenous  contrast.  Coronal and sagital reconstructions were preformed.    This exam was performed according to our departmental  dose-optimization program, which includes automated exposure  control, adjustment of the mA and/or kV according to patient size  and/or use of iterative reconstruction technique.    DLP: 961.80    Comparison: None    Findings:  Bone windows are unremarkable.  Fluid opacifies the majority of the right maxillary sinus.  Nasogastric tube in place.  Endotracheal tube in place.    Minimal cerebral atrophy.  Minimal small vessel disease.  No hemorrhage.  No mass.  No abnormal  areas of increased attenuation.  No midline shift.  No abnormal extra-axial fluid collections.      Impression:       CONCLUSION:  Acute right maxillary sinusitis.  Nasogastric tube in place.  Endotracheal tube in place.  Minimal cerebral atrophy.  Minimal small vessel disease.    [id number]    Electronically signed by:  Satnam Johnson MD  9/8/2020 6:04 PM CDT  Workstation: [id number]          ABG:  Results from last 7 days   Lab Units 09/08/20  0650   PH, ARTERIAL pH units 7.456*   PO2 ART mm Hg 77.3*   PCO2, ARTERIAL mm Hg 35.4   HCO3 ART mmol/L 24.9       I have reviewed the patient's current medications.     Assessment/Plan     Hospital Problem List:  Active Problems:    Respiratory failure with hypoxia and hypercapnia (CMS/HCC)    Acute respiratory failure with hypoxia and hypercapnia (secondary to bilateral pneumonia to rule out COVID-19 viral infection): Pulm is following. Extubated 9/9/2020. Continue antibiotics.     Sepsis secondary to bilateral pneumonia (to rule out COVID-19 viral infection):  Blood cultures have shown no growth.  Lactic acidosis has resolved.     Azotemia: Resolved.    Elevated d-dimer is likely due to acute illness and CT pulmonary angiogram is negative for pulmonary embolism.    Hyperglycemia is likely due to acute illness as patient is not a known diabetic.  Hemoglobin A1c is 5.90.  Continue Accu-Cheks and sliding scale insulin.    Hypertension: Stable. Continue lisinopril.      Nicotine dependence: Continue nicotine patch.      Nutrition: advance diet as tolerated     Continue GI and DVT prophylaxis.        Discharge Planning: In progress.    Pat Naranjo MD   09/09/20   17:05

## 2020-09-09 NOTE — PLAN OF CARE
Problem: Ventilation, Mechanical Invasive (Adult)  Intervention: Prevent Airway Displacement/Mechanical Dysfunction  Flowsheets (Taken 9/9/2020 1267)  Airway Safety Measures: manual resuscitator/mask/valve in room  Intervention: Prevent Ventilator-Induced Lung Injury  Flowsheets (Taken 9/8/2020 1718 by Antonietta Gallo, RRT)  Lung Protection Measures: optimal PEEP applied;plateau/inspiratory pressure monitored  Intervention: Optimize Oxygenation/Ventilation  Flowsheets (Taken 9/9/2020 0505)  Airway/Ventilation Management: airway patency maintained  Note:   PT stable throughout the night on current vent settings.   Required minimal increase in O2 d/t decreasing SATs high 80's low 90's.    Will continue to monitor for weaning readiness.

## 2020-09-09 NOTE — NURSING NOTE
Patient extubated at 1030, patient alert and oriented. Patient states that her granddaughter, Diony Han is to make all decision for her if something happens. Advance care planning consult placed.

## 2020-09-09 NOTE — PLAN OF CARE
Problem: Patient Care Overview  Goal: Plan of Care Review  Outcome: Ongoing (interventions implemented as appropriate)  Flowsheets (Taken 9/9/2020 1402)  Progress: improving  Plan of Care Reviewed With: patient  Outcome Summary: bedside swallow evaluation completed on pt extubated today.  she has coretrak in place during evaluatioin and demonstrates a nonproductive throat clear.  she also has hoarse voice after extubation.  grandson is present.  she is missing some teeth, but states it does not limit what she eats. pt tolerates liquids via straw, puree, and moistened solids.  recommend mech soft solids with gravy on meats. SLP will f/u for diet toleration v/s advancement as appropriate.

## 2020-09-09 NOTE — THERAPY EVALUATION
Acute Care - Speech Language Pathology   Swallow Initial Evaluation [facility]     Patient Name: Theresa Esquivel  : 1955  MRN: [id number]  Today's Date: 2020               Admit Date: 2020  coretrak removed after session.  Pt with large amount of thick sputum spit out.  Throat clearing decreased after removal.    Pat Egan CCC-SLP 2020 14:09    Visit Dx:     ICD-10-CM ICD-9-CM   1. Respiratory failure with hypoxia and hypercapnia, unspecified chronicity (CMS/HCC) J96.91 518.81    J96.92    2. Pneumonia of both lungs due to infectious organism, unspecified part of lung J18.9 483.8   3. Elevated d-dimer R79.89 790.92   4. Pharyngeal dysphagia R13.13 787.23     Patient Active Problem List   Diagnosis   • Respiratory failure with hypoxia and hypercapnia (CMS/HCC)     Past Medical History:   Diagnosis Date   • Acute bronchitis, unspecified    • Acute exacerbation of chronic bronchitis (CMS/HCC)    • Acute exacerbation of chronic bronchitis (CMS/HCC)    • Acute frontal sinusitis, unspecified    • Acute laryngitis    • Allergic rhinitis    • Anxiety    • Cancer (CMS/HCC)    • Chronic back pain    • Encounter for therapeutic drug monitoring    • Family history of polyps in the colon    • H/O bone density study 2014   • H/O mammogram 2014   • Hyperlipidemia    • Hypertension    • Malignant neoplasm of female breast (CMS/HCC)      L breast, sp mastectomy      • Osteopenia    • Panic disorder    • Right lower quadrant pain     rule out appendicitis        Past Surgical History:   Procedure Laterality Date   • INJECTION OF MEDICATION  2016    Ramon(3)   • MASTECTOMY  2003    left breast for cancer   • PAP SMEAR  2013    negative for malignancy        SWALLOW EVALUATION (last 72 hours)      SLP Adult Swallow Evaluation     Row Name 20 1330                   Rehab Evaluation    Document Type  evaluation  -EC        Subjective Information  no complaints  -EC         Patient Observations  alert;cooperative;agree to therapy  -EC        Patient/Family Observations  grandson present  -EC        Patient Effort  good  -EC           General Information    Patient Profile Reviewed  yes  -EC        Pertinent History Of Current Problem  extubated today  -EC        Current Method of Nutrition  NPO;nasogastric feedings  -EC           Pain Assessment    Additional Documentation  Pain Scale: FACES Pre/Post-Treatment (Group)  -EC           Pain Scale: FACES Pre/Post-Treatment    Pain: FACES Scale, Pretreatment  0-->no hurt  -EC        Pain: FACES Scale, Post-Treatment  0-->no hurt  -EC           Oral Motor and Function    Dentition Assessment  missing teeth  -EC        Secretion Management  WNL/WFL  -EC        Mucosal Quality  moist, healthy  -EC        Gag Response  WFL  -EC           General Eating/Swallowing Observations    Respiratory Support Currently in Use  nasal cannula  -EC        Eating/Swallowing Skills  self-fed;fed by SLP  -EC        Positioning During Eating  upright 90 degree;upright in bed  -EC        Utensils Used  spoon;straw  -EC        Consistencies Trialed  thin liquids;pureed;regular textures  -EC           Clinical Swallow Eval    Oral Prep Phase  WFL  -EC        Oral Transit  WFL  -EC        Oral Residue  impaired  -EC        Pharyngeal Phase  suspected pharyngeal impairment  -EC        Esophageal Phase  unremarkable  -EC        Clinical Swallow Evaluation Summary  pt has throat clear prior to bedside swallow eval  -EC           Oral Residue Concerns    Oral Residue Concerns  diffuse residue throughout oral cavity  -EC        Diffuse Residue Throughout Oral Cavity  regular consistencies  -EC        Oral Residue Concerns, Comment  pt cleared cracker with liquid wash  -EC           Pharyngeal Phase Concerns    Pharyngeal Phase Concerns  throat clear  -EC        Throat Clear  other (see comments)  -EC        Pharyngeal Phase Concerns, Comment  pt has throat clear before ,  during , and after swallow eval.    -EC           Clinical Impression    SLP Swallowing Diagnosis  mild;pharyngeal dysfunction  -EC        Functional Impact  risk of aspiration/pneumonia  -EC        Rehab Potential/Prognosis, Swallowing  good, to achieve stated therapy goals  -EC        Swallow Criteria for Skilled Therapeutic Interventions Met  demonstrates skilled criteria  -EC           Recommendations    Therapy Frequency (Swallow)  3 days per week;5 days per week  -EC        Predicted Duration Therapy Intervention (Days)  until discharge  -EC        SLP Diet Recommendation  soft textures;ground;thin liquids  -EC        SLP Rec. for Method of Medication Administration  as tolerated  -EC           Swallow Goals (SLP)    Oral Nutrition/Hydration Goal Selection (SLP)  oral nutrition/hydration, SLP goal 1  -EC           Oral Nutrition/Hydration Goal 1 (SLP)    Oral Nutrition/Hydration Goal 1, SLP  pt to tolerate recommended diet for safe and adequate nutrition/hydration  -EC        Time Frame (Oral Nutrition/Hydration Goal 1, SLP)  by discharge  -EC        Barriers (Oral Nutrition/Hydration Goal 1, SLP)  throat clear  -EC        Progress/Outcomes (Oral Nutrition/Hydration Goal 1, SLP)  goal ongoing  -EC          User Key  (r) = Recorded By, (t) = Taken By, (c) = Cosigned By    Initials Name Effective Dates    Pat Redd CCC-SLP 02/11/20 -           EDUCATION  The patient has been educated in the following areas:   Dysphagia (Swallowing Impairment) Modified Diet Instruction.    SLP Recommendation and Plan  SLP Swallowing Diagnosis: mild, pharyngeal dysfunction  SLP Diet Recommendation: soft textures, ground, thin liquids     SLP Rec. for Method of Medication Administration: as tolerated           Swallow Criteria for Skilled Therapeutic Interventions Met: demonstrates skilled criteria     Rehab Potential/Prognosis, Swallowing: good, to achieve stated therapy goals  Therapy Frequency (Swallow): 3 days per  week, 5 days per week  Predicted Duration Therapy Intervention (Days): until discharge       Plan of Care Reviewed With: patient  Progress: improving  Outcome Summary: bedside swallow evaluation completed on pt extubated today.  she has coretrak in place during evaluatioin and demonstrates a nonproductive throat clear.  she also has hoarse voice after extubation.  grandson is present.  she is missing some teeth, but states it does not limit what she eats. pt tolerates liquids via straw, puree, and moistened solids.  recommend mech soft solids with gravy on meats. SLP will f/u for diet toleration v/s advancement as appropriate.    SLP GOALS     Row Name 09/09/20 1330             Oral Nutrition/Hydration Goal 1 (SLP)    Oral Nutrition/Hydration Goal 1, SLP  pt to tolerate recommended diet for safe and adequate nutrition/hydration  -EC      Time Frame (Oral Nutrition/Hydration Goal 1, SLP)  by discharge  -EC      Barriers (Oral Nutrition/Hydration Goal 1, SLP)  throat clear  -EC      Progress/Outcomes (Oral Nutrition/Hydration Goal 1, SLP)  goal ongoing  -EC        User Key  (r) = Recorded By, (t) = Taken By, (c) = Cosigned By    Initials Name Provider Type    EC Pat Egan CCC-SLP Speech and Language Pathologist             Time Calculation:   Time Calculation- SLP     Row Name 09/09/20 1407             Time Calculation- SLP    SLP Start Time  1330  -EC      SLP Stop Time  1400  -EC      SLP Time Calculation (min)  30 min  -EC      Total Timed Code Minutes- SLP  30 minute(s)  -EC      SLP Received On  09/09/20  -EC      SLP Goal Re-Cert Due Date  09/23/20  -EC        User Key  (r) = Recorded By, (t) = Taken By, (c) = Cosigned By    Initials Name Provider Type    EC Pat Egan CCC-SLP Speech and Language Pathologist          Therapy Charges for Today     Code Description Service Date Service Provider Modifiers Qty    [id number] HC ST EVAL ORAL PHARYNG SWALLOW 2 9/9/2020 Pat Egan CCC-SLP  GN 1               Pat Egan, CRIS-SLP  9/9/2020

## 2020-09-09 NOTE — PAYOR COMM NOTE
"Theresa Esquivel (64 y.o. Female)     Date of Birth Social Security Number Address Home Phone MRN    1955  [address] [facility] [address] [phone] [id number]    Yazdanism Marital Status          Taoism Legally        Admission Date Admission Type Admitting Provider Attending Provider Department, Room/Bed    9/4/20 Emergency Dagoberto Crum MD Haigler, Stuart S, MD [facility] CRITICAL CARE STEPDOWN, 06/A    Discharge Date Discharge Disposition Discharge Destination                       Attending Provider:  Dagoberto Crum MD    Allergies:  Augmentin [Amoxicillin-pot Clavulanate], Ciprofloxacin, Codeine, Eggs Or Egg-derived Products, Paxil [Paroxetine Hcl]    Isolation:  None   Infection:  None   Code Status:  CPR    Ht:  162.6 cm (64\")   Wt:  81.8 kg (180 lb 5.4 oz)    Admission Cmt:  None   Principal Problem:  None                Active Insurance as of 9/4/2020     Primary Coverage     Payor Plan Insurance Group Employer/Plan Group    [employer] [place]      Payor Plan Address Payor Plan Phone Number Payor Plan Fax Number Effective Dates    [address] [address]   1/1/2014 - None Entered    [place] [address]       Subscriber Name Subscriber Birth Date Member ID       JERRODTHERESA HWANG 1955 [id number]                 Emergency Contacts      (Rel.) Home Phone Work Phone Mobile Phone    Connie Collins (Daughter) [phone] -- --    EmelyDiony (Grandchild) -- -- [phone]    CarastephesvinEulogio john (Son) -- -- [phone]        Admitted 9/5 and review sent 9/5    Insurance Information                [place] Phone:     Subscriber: Theresa Esquivel Subscriber#: [id number]    Group#:  Precert#:           Vital Signs (last day)     Date/Time   Temp   Temp src   Pulse   Resp   BP   Patient Position   SpO2    09/09/20 1100   --   --   97   --   139/69   --   98    09/09/20 " 1000   --   --   71   --   139/74   --   96    09/09/20 0935   --   --   63   20   113/63   Lying   95    09/09/20 0900   --   --   57   --   (!) 76/48   --   94    09/09/20 0800   98.5 (36.9)   Oral   68   22   112/61   Lying   99    09/09/20 0730   --   --   59   --   108/57   --   95    09/09/20 0708   --   --   56   20   --   --   --    09/09/20 0700   --   --   54   --   (!) 74/42   --   93    09/09/20 0600   --   --   57   --   (!) 84/49   --   94    09/09/20 0500   --   --   56   --   90/53   --   94    09/09/20 0447   --   --   57   20   --   --   94    09/09/20 0400   98.4 (36.9)   Oral   57   --   (!) 77/47   --   94    09/09/20 0305   --   --   56   20   --   --   93    09/09/20 0300   --   --   57   --   102/59   --   93    09/09/20 0200   --   --   61   --   110/59   --   94    09/09/20 0112   --   --   67   20   --   --   97    09/09/20 0100   --   --   67   --   139/78   --   97    09/09/20 0000   97.2 (36.2)   Oral   56   --   103/58   --   93 09/08/20 2351   --   --   56   20   --   --   93    09/08/20 2300   --   --   58   --   103/60   --   93    09/08/20 2200   --   --   62   --   103/59   --   93    09/08/20 2106   --   --   64   19   --   --   96    09/08/20 2100   --   --   59   --   121/64   --   93    09/08/20 2000   --   --   60   --   121/60   --   93    09/08/20 1900   --   --   59   --   98/51   --   92    09/08/20 1833   --   --   59   20   --   --   90    09/08/20 1800   --   --   60   --   109/62   --   94    09/08/20 1718   --   --   66   20   111/60   --   92    09/08/20 1600   97 (36.1)   Oral   73   20   116/62   Lying   94    09/08/20 1532   --   --   72   20   95/56   --   95    09/08/20 1500   --   --   65   --   91/56   --   95    09/08/20 1400   --   --   68   22   97/59   Lying   95    09/08/20 1325   --   --   67   20   --   --   95    09/08/20 1300   --   --   66   --   95/55   --   94    09/08/20 1200   96.3 (35.7)   Axillary   62   --   112/59   Lying   98    09/08/20  1111   --   --   79   23   --   --   99    09/08/20 1000   --   --   66   20   91/53   Lying   95    09/08/20 0932   --   --   64   20   --   --   95    09/08/20 0900   --   --   71   --   112/66   --   97    09/08/20 0800   96.6 (35.9)   Axillary   72   20   97/57   Lying   95    09/08/20 0721   --   --   70   20   --   --   95    09/08/20 0717   --   --   70   20   --   --   95    09/08/20 0700   --   --   71   --   91/54   --   95    09/08/20 0600   --   --   74   --   97/56   --   94    09/08/20 0520   --   --   96   20   --   --   98    09/08/20 0500   --   --   94   --   153/80   --   98    09/08/20 0400   98.1 (36.7)   Oral   75   --   103/62   Lying   96    09/08/20 0344   --   --   93   25   --   --   100    09/08/20 0300   --   --   75   --   107/68   --   94    09/08/20 0200   --   --   74   --   100/62   --   94    09/08/20 0116   --   --   76   20   --   --   96    09/08/20 0100   --   --   76   --   102/64   --   97    09/08/20 0000   98 (36.7)   Oral   77   --   103/56   --   93                Current Facility-Administered Medications   Medication Dose Route Frequency Provider Last Rate Last Dose   • albuterol sulfate HFA (PROVENTIL HFA;VENTOLIN HFA;PROAIR HFA) inhaler 2 puff  2 puff Inhalation 4x Daily - RT Eulogio Gonzalez MD   2 puff at 09/09/20 0707   • budesonide-formoterol (SYMBICORT) 160-4.5 MCG/ACT inhaler 2 puff  2 puff Inhalation BID - RT Eulogio Gonzalez MD   2 puff at 09/09/20 0707   • cefTRIAXone (ROCEPHIN) 1 g/100 mL 0.9% NS (MBP)  1 g Intravenous Q24H Eulogio Gonzalez MD   1 g at 09/08/20 2316   • dextrose (D50W) 25 g/ 50mL Intravenous Solution 25 g  25 g Intravenous Q15 Min PRN Dagoberto Crum MD       • dextrose (GLUTOSE) oral gel 15 g  15 g Oral Q15 Min PRN Dagoberto Crum MD       • doxycycline (VIBRAMYCIN) oral suspension 100 mg  100 mg Per G Tube Q12H Feng Valverde MD   100 mg at 09/09/20 0802   • enalaprilat (VASOTEC) injection 1.25 mg  1.25 mg  Intravenous Q6H PRN Eulogio Gonzalez MD       • enoxaparin (LOVENOX) syringe 40 mg  40 mg Subcutaneous Q12H Dagoberto Crum MD   40 mg at 09/09/20 0802   • famotidine (PEPCID) injection 20 mg  20 mg Intravenous Daily Eulogio Gonzalez MD   20 mg at 09/09/20 0801   • fentaNYL citrate (PF) (SUBLIMAZE) injection 50 mcg  50 mcg Intravenous Q1H PRN Feng Valverde MD   50 mcg at 09/09/20 0744   • furosemide (LASIX) injection 20 mg  20 mg Intravenous Daily Feng Valverde MD   20 mg at 09/09/20 0802   • glucagon (human recombinant) (GLUCAGEN DIAGNOSTIC) injection 1 mg  1 mg Subcutaneous Q15 Min PRN Dagoberto Crum MD       • insulin aspart (novoLOG) injection 0-7 Units  0-7 Units Subcutaneous TID AC Dagoberto Crum MD   2 Units at 09/08/20 0640   • lisinopril (PRINIVIL,ZESTRIL) tablet 10 mg  10 mg Oral Daily Eulogio Gonzalez MD   10 mg at 09/07/20 0810   • midazolam (VERSED) 50mg/100mL normal saline infusion  1-10 mg/hr Intravenous Titrated Sage Norman MD   Stopped at 09/09/20 0953   • midazolam (VERSED) injection 2 mg  2 mg Intravenous Q1H PRN Feng Valverde MD   2 mg at 09/09/20 0801   • potassium chloride (KLOR-CON) packet 40 mEq  40 mEq Oral PRN Preet Chaidez, DO   40 mEq at 09/07/20 1712   • propofol (DIPRIVAN) infusion 10 mg/mL 100 mL  5-50 mcg/kg/min Intravenous Titrated Raghav Lopez MD   Stopped at 09/09/20 0929   • QUEtiapine (SEROquel) tablet 50 mg  50 mg Oral Q8H Feng Valverde MD   50 mg at 09/09/20 0504   • sodium chloride 0.9 % flush 10 mL  10 mL Intravenous PRN Raghav Lopez MD   10 mL at 09/05/20 2149   • sodium chloride 0.9 % flush 10 mL  10 mL Intravenous Q12H Dagoberto Crum MD   10 mL at 09/09/20 0802   • sodium chloride 0.9 % flush 10 mL  10 mL Intravenous PRN Dagoberto Crum MD            Physician Progress Notes (last 48 hours) (Notes from 09/07/20 1115 through 09/09/20 1115)      Feng Valverde MD at  20 1136                                      Feng Valverde MD                          [phone]      Patient ID:    Name:  Theresa Esquivel    MRN:  [id number]    1955   64 y.o.  female            Patient Care Team:  Pat Montiel APRN as PCP - General (Family Medicine)    CC/ Reason for visit: Pneumonia, respiratory failure, ventilator management    Subjective: Pt seen and examined this AM.  Patient continues to have encephalopathy issues requiring PRN medications and propofol drip.  Started on Seroquel yesterday with not much response.  Requiring minimal ventilator settings.  SBT had to be discontinued due to agitation and not following commands.    ROS: Unable to obtain due to respiratory failure    Objective     Vital Signs past 24hrs    BP range: BP: ()/(40-80) 91/53  Pulse range: Heart Rate:  [64-97] 79  Resp rate range: Resp:  [20-25] 23  Temp range: Temp (24hrs), Av.7 °F (36.5 °C), Min:96.6 °F (35.9 °C), Max:98.1 °F (36.7 °C)      Ventilator/Non-Invasive Ventilation Settings (From admission, onward)     Start     Ordered    20 0312  Ventilator - AC/VC+; 20; 90%; 5; 500  Continuous     Question Answer Comment   Vent Mode AC/VC+    Breath rate 20    FiO2 titrate for Sp02% =/> 90%    PEEP 5    Tidal Volume 500        20 0313                Device (Oxygen Therapy): ventilator FiO2 (%): 35 %     79 kg (174 lb 2.6 oz); Body mass index is 29.9 kg/m².      Intake/Output Summary (Last 24 hours) at 2020 1136  Last data filed at 2020 0800  Gross per 24 hour   Intake 3321.19 ml   Output 2095 ml   Net 1226.19 ml       PHYSICAL EXAM   Constitutional: Middle aged pt in bed, sedated on the mechanical ventilator  Head: - NCAT  Eyes: No pallor, Anicteric conjunctiva, EOMI.  ENMT:  ETT+, no oral thrush. Moist MM.   NECK: Trachea midline, No thyromegaly, no palpable cervical LNpathy  Heart: RRR, no murmur. No pedal edema   Lungs: ANGLE +, No wheezes/ crackles  heard    Abdomen: Soft. No tenderness, guarding or rigidity. No palpable masses  Extremities: Extremities warm and well perfused. No cyanosis/ clubbing  Neuro: Sedated, no gross focal neuro deficits  Psych: Mood and affect UTO    Scheduled meds:      albuterol sulfate HFA 2 puff Inhalation 4x Daily - RT   azithromycin 500 mg Intravenous Q24H   budesonide-formoterol 2 puff Inhalation BID - RT   cefTRIAXone 1 g Intravenous Q24H   dexamethasone 2 mg Intravenous Q8H   enoxaparin 40 mg Subcutaneous Q12H   famotidine 20 mg Intravenous Daily   furosemide 20 mg Intravenous Daily   insulin aspart 0-7 Units Subcutaneous TID AC   lisinopril 10 mg Oral Daily   QUEtiapine 50 mg Oral Q8H   sodium chloride 10 mL Intravenous Q12H       IV meds:                          propofol 5-50 mcg/kg/min Last Rate: 20 mcg/kg/min (09/08/20 1030)       Data Review:      Results from last 7 days   Lab Units 09/08/20  0546 09/08/20  0509 09/07/20  2106 09/07/20  0422 09/06/20  0239  09/04/20  2316   SODIUM mmol/L 137  --   --  138 141   < > 138   POTASSIUM mmol/L 4.0  --  4.4 3.1* 4.1   < > 3.6   CHLORIDE mmol/L 104  --   --  105 106   < > 95*   CO2 mmol/L 23.0  --   --  24.0 23.0   < > 18.0*   BUN mg/dL 25*  --   --  21 15   < > 10   CREATININE mg/dL 0.74  --   --  0.82 0.87   < > 1.13*   CALCIUM mg/dL 8.8  --   --  8.3* 9.3   < > 9.4   BILIRUBIN mg/dL  --   --   --   --   --   --  0.5   ALK PHOS U/L  --   --   --   --   --   --  115   ALT (SGPT) U/L  --   --   --   --   --   --  22   AST (SGOT) U/L  --   --   --   --   --   --  30   GLUCOSE mg/dL 153*  --   --  109* 141*   < > 234*   WBC 10*3/mm3  --  12.13*  --  10.86* 17.89*   < > 15.90*   HEMOGLOBIN g/dL  --  14.7  --  13.6 15.6   < > 16.2*   PLATELETS 10*3/mm3  --  272  --  275 296   < > 363   PROBNP pg/mL  --   --   --   --   --   --  146.8   PROCALCITONIN ng/mL  --   --   --   --  0.97*  --   --     < > = values in this interval not displayed.       Lab Results   Component Value Date     CALCIUM 8.8 09/08/2020    PHOS 3.3 09/08/2020       Results from last 7 days   Lab Units 09/05/20  1611 09/05/20  0531 09/05/20  0046   BLOODCX   --  No growth at 3 days No growth at 3 days   RESPCX  Scant growth (1+) Normal Respiratory Cele: NO S.aureus/MRSA or Pseudomonas aeruginosa  --   --        Results from last 7 days   Lab Units 09/08/20  0650 09/05/20  0453 09/05/20  0008   PH, ARTERIAL pH units 7.456* 7.385 7.318*   PO2 ART mm Hg 77.3* 92.5 176.0*   PCO2, ARTERIAL mm Hg 35.4 38.4 45.3*   HCO3 ART mmol/L 24.9 23.0 23.2        Results Review:    I have reviewed the relevant laboratory results and independently reviewed the chest imaging from this hospitalization including the available echocardiogram reports personally and summarized it if/ when appropriate below    Assessment    Acute hypoxic/hypercapnic respiratory failure s/p mechanical ventilation 9/5   Persistent metabolic encephalopathy  Post extubation stridor - needing reintubation 9/7  Bibasilar pneumonia  Sepsis  Acute on chronic congestive heart failure  COVID-19 negative x2  Prolonged Qtc  Mild lactic acidosis  Suspected drug abuse  Remote breast cancer  Chronic pain syndrome on narcotics  Remote tobacco abuse    PLAN:  Patient remains on the mechanical ventilator low ventilator support.  Had to be reintubated for postextubation stridor.  Ongoing encephalopathy is of bigger concern likely multifactorial from pneumonia/drug abuse and baseline psychiatric issues reported per granddaughter.  We will continue with current ventilator support and Seroquel and give her some more time.  Continue with steroids for post extubation stridor.  Will need to factor steroid-induced psychosis as well and hence we will plan to stop soon.  Continue with empiric antibiotics and spot diuresis to keep her negative.  UDS shows narcotics only.  Granddaughter unaware about her substance abuse charges.  Will DC azithro and switch to doxy due to prolonged qtc. Might need  to dc seroquel.     Full code    DVT/GI prophylaxis    CC- 35 mins    I have discussed my findings and recommendations with patient, nursing staff and primary care team.     Feng Valverde MD  9/8/2020    Electronically signed by Feng Valverde MD at 09/08/20 1145     [facility], Eulogio CASILLAS MD at 09/08/20 1053              [facility] Medicine Services  INPATIENT PROGRESS NOTE    Length of Stay: 3  Date of Admission: 9/4/2020  Primary Care Physician: Pat Montiel APRN    Subjective   Chief Complaint: Respiratory failure.    HPI: Patient is seen for follow-up.  She is a 64-year-old female with history of nicotine dependence, chronic pain syndrome, chronic bronchitis, hypertension who was admitted for acute hypoxic/hypercapnic respiratory failure requiring endotracheal intubation.    She remains intubated, sedated and restrained.    Review of Systems  Unable to review as the patient is intubated and sedated.  Objective    Temp:  [96.6 °F (35.9 °C)-98.1 °F (36.7 °C)] 96.6 °F (35.9 °C)  Heart Rate:  [64-97] 66  Resp:  [20-25] 20  BP: ()/(40-80) 91/53  FiO2 (%):  [35 %] 35 %    Vent Settings:  FiO2 (%):  [35 %] 35 %  S RR:  [20] 20  PEEP/CPAP (cm H2O):  [5 cm H20] 5 cm H20  [place] SUP:  [0 cm H20] 0 cm H20  MAP (cm H2O):  [9.3-14] 11    Physical Exam   Constitutional: She appears well-developed and well-nourished. She is cooperative. No distress. She is sedated, intubated and restrained.   HENT:   Head: Normocephalic and atraumatic.   Right Ear: External ear normal.   Left Ear: External ear normal.   Nose: Nose normal.   Mouth/Throat: Oropharynx is clear and moist.   Eyes: Conjunctivae and EOM are normal.   Neck: Neck supple. No JVD present. No thyromegaly present.   Cardiovascular: Normal rate, regular rhythm, normal heart sounds and intact distal pulses. Exam reveals no gallop and no friction rub.   No murmur heard.  Pulmonary/Chest: Effort normal. No  stridor. She is intubated. No respiratory distress. She has decreased breath sounds. She has no wheezes. She has rhonchi. She has no rales. She exhibits no tenderness.   Abdominal: Soft. Bowel sounds are normal. She exhibits no distension and no mass. There is no tenderness. There is no rebound and no guarding. No hernia.   Musculoskeletal: She exhibits no edema, tenderness or deformity.   Neurological: She has normal reflexes. She exhibits normal muscle tone.   Skin: Skin is warm and dry. No rash noted. She is not diaphoretic. No erythema. No pallor.   Nursing note and vitals reviewed.        Medication Review:    Current Facility-Administered Medications:   •  albuterol sulfate HFA (PROVENTIL HFA;VENTOLIN HFA;PROAIR HFA) inhaler 2 puff, 2 puff, Inhalation, 4x Daily - RT, Eulogio Gonzalez MD, 2 puff at 09/08/20 0721  •  AZITHROMYCIN 500 MG/250 ML 0.9% NS IVPB (vial-mate), 500 mg, Intravenous, Q24H, Dagoberto Crum MD, 500 mg at 09/07/20 2358  •  budesonide-formoterol (SYMBICORT) 160-4.5 MCG/ACT inhaler 2 puff, 2 puff, Inhalation, BID - RT, Eulogio Gonzalez MD, 2 puff at 09/08/20 0721  •  cefTRIAXone (ROCEPHIN) 1 g/100 mL 0.9% NS (MBP), 1 g, Intravenous, Q24H, Eulogio Gonzalez MD, 1 g at 09/07/20 2308  •  dexamethasone (DECADRON) injection 2 mg, 2 mg, Intravenous, Q8H, Feng Valverde MD, 2 mg at 09/08/20 0326  •  dextrose (D50W) 25 g/ 50mL Intravenous Solution 25 g, 25 g, Intravenous, Q15 Min PRN, Dagoberto Crum MD  •  dextrose (GLUTOSE) oral gel 15 g, 15 g, Oral, Q15 Min PRN, Dagoberto Crum MD  •  enalaprilat (VASOTEC) injection 1.25 mg, 1.25 mg, Intravenous, Q6H PRN, Eulogio Gonzalez MD  •  enoxaparin (LOVENOX) syringe 40 mg, 40 mg, Subcutaneous, Q12H, Dagoberto Crum MD, 40 mg at 09/08/20 0841  •  famotidine (PEPCID) injection 20 mg, 20 mg, Intravenous, Daily, Eulogio Gonzalez MD, 20 mg at 09/08/20 0842  •  fentaNYL citrate (PF) (SUBLIMAZE) injection 50 mcg, 50 mcg,  Intravenous, Q1H PRN, Feng Valverde MD, 50 mcg at 09/08/20 0851  •  glucagon (human recombinant) (GLUCAGEN DIAGNOSTIC) injection 1 mg, 1 mg, Subcutaneous, Q15 Min PRN, Dagoberto Crum MD  •  insulin aspart (novoLOG) injection 0-7 Units, 0-7 Units, Subcutaneous, TID AC, Dagoberto Crum MD, 2 Units at 09/08/20 0640  •  lisinopril (PRINIVIL,ZESTRIL) tablet 10 mg, 10 mg, Oral, Daily, Eulogio Gonzalez MD, 10 mg at 09/07/20 0810  •  midazolam (VERSED) injection 2 mg, 2 mg, Intravenous, Q1H PRN, Feng Valverde MD, 2 mg at 09/08/20 0525  •  potassium chloride (KLOR-CON) packet 40 mEq, 40 mEq, Oral, PRN, Chaidez, Preet Stantonish, DO, 40 mEq at 09/07/20 1712  •  propofol (DIPRIVAN) infusion 10 mg/mL 100 mL, 5-50 mcg/kg/min, Intravenous, Titrated, Raghav Lopez MD, Last Rate: 10.56 mL/hr at 09/08/20 1030, 20 mcg/kg/min at 09/08/20 1030  •  QUEtiapine (SEROquel) tablet 50 mg, 50 mg, Oral, Q8H, Feng Valverde MD, 50 mg at 09/08/20 0524  •  sodium chloride 0.9 % flush 10 mL, 10 mL, Intravenous, PRN, Raghav Lopez MD, 10 mL at 09/05/20 2149  •  sodium chloride 0.9 % flush 10 mL, 10 mL, Intravenous, Q12H, Dagoberto Crum MD, 10 mL at 09/08/20 0843  •  sodium chloride 0.9 % flush 10 mL, 10 mL, Intravenous, PRN, Dagoberto Crum MD    Results Review:  I have reviewed the labs, radiology results, and diagnostic studies.    Laboratory Data:   Results from last 7 days   Lab Units 09/08/20  0546 09/07/20 2106 09/07/20 0422 09/06/20  0239  09/04/20  2316   SODIUM mmol/L 137  --  138 141   < > 138   POTASSIUM mmol/L 4.0 4.4 3.1* 4.1   < > 3.6   CHLORIDE mmol/L 104  --  105 106   < > 95*   CO2 mmol/L 23.0  --  24.0 23.0   < > 18.0*   BUN mg/dL 25*  --  21 15   < > 10   CREATININE mg/dL 0.74  --  0.82 0.87   < > 1.13*   GLUCOSE mg/dL 153*  --  109* 141*   < > 234*   CALCIUM mg/dL 8.8  --  8.3* 9.3   < > 9.4   BILIRUBIN mg/dL  --   --   --   --   --  0.5   ALK PHOS U/L  --   --   --   --   --   115   ALT (SGPT) U/L  --   --   --   --   --  22   AST (SGOT) U/L  --   --   --   --   --  30   ANION GAP mmol/L 10.0  --  9.0 12.0   < > 25.0*    < > = values in this interval not displayed.     Estimated Creatinine Clearance: 78.1 mL/min (by C-G formula based on SCr of 0.74 mg/dL).  Results from last 7 days   Lab Units 09/08/20  0546 09/07/20  0422 09/06/20  0239   MAGNESIUM mg/dL 2.3 1.9 2.2   PHOSPHORUS mg/dL 3.3 3.7 3.4         Results from last 7 days   Lab Units 09/08/20  0509 09/07/20  0422 09/06/20  0239 09/05/20  0532 09/04/20  2316   WBC 10*3/mm3 12.13* 10.86* 17.89* 11.68* 15.90*   HEMOGLOBIN g/dL 14.7 13.6 15.6 14.6 16.2*   HEMATOCRIT % 43.6 39.6 46.8* 43.2 49.9*   PLATELETS 10*3/mm3 272 275 296 297 363           Culture Data:   No results found for: BLOODCX  No results found for: URINECX  Respiratory Culture   Date Value Ref Range Status   09/05/2020   Final    Scant growth (1+) Normal Respiratory Cele: NO S.aureus/MRSA or Pseudomonas aeruginosa     No results found for: WOUNDCX  No results found for: STOOLCX  No components found for: BODYFLD    Radiology Data:   Imaging Results (Last 24 Hours)     ** No results found for the last 24 hours. **          ABG:  Results from last 7 days   Lab Units 09/08/20  0650   PH, ARTERIAL pH units 7.456*   PO2 ART mm Hg 77.3*   PCO2, ARTERIAL mm Hg 35.4   HCO3 ART mmol/L 24.9       I have reviewed the patient's current medications.     Assessment/Plan     Hospital Problem List:  Active Problems:    Respiratory failure with hypoxia and hypercapnia (CMS/HCC)    Acute respiratory failure with hypoxia and hypercapnia (secondary to bilateral pneumonia to rule out COVID-19 viral infection): Continue ventilatory support, IV antibiotics and inhalers.  Blood cultures, respiratory cultures showed no growth and COVID-19 PCR were negative x2.  Leukocytosis is reactive and much improved.  Chest x-ray done 9/7/2020 showed much improvement.  Input by pulmonary is  appreciated.    Sepsis secondary to bilateral pneumonia (to rule out COVID-19 viral infection): Continue IV antibiotics.   Blood cultures have shown no growth.  Lactic acidosis has resolved.     Azotemia: Resolved.    Elevated d-dimer is likely due to acute illness and CT pulmonary angiogram is negative for pulmonary embolism.    Hyperglycemia is likely due to acute illness as patient is not a known diabetic.  Hemoglobin A1c is 5.90.  Continue Accu-Cheks and sliding scale insulin.    Hypertension: Stable. Continue lisinopril.      Nicotine dependence: Continue nicotine patch.  Patient will receive counseling postextubation.    Nutrition: Continue  NG tube feeding today.  Dietitian is following.      Continue GI and DVT prophylaxis.    Update was given to her daughter Connie Collins.    Discharge Planning: In progress.    Eulogio Gonzalez MD   09/08/20   10:53        Electronically signed by Eulogio Gonzalez MD at 09/08/20 1507     Feng Valverde MD at 09/07/20 1406                                      Feng Valverde MD                          [phone]      Patient ID:    Name:  Theresa Esquivel    MRN:  [id number]    1955   64 y.o.  female            Patient Care Team:  Pat Montiel APRN as PCP - General (Family Medicine)    CC/ Reason for visit: Pneumonia, respiratory failure, ventilator management    Subjective: Pt seen and examined this AM.  Patient did well overnight and was able to successfully wean off Versed and was on propofol this morning.  Extubated after she was nodding to questions and intermittently following commands when off sedation and did well initially but had postextubation stridor requiring reintubation after not doing well when given time and racemic epi nebulized medications.  No family at bedside to update.  Nurse notifies me that there is significant concern for drug abuse in the patient as well as family members.    ROS: Unable to obtain due to  respiratory failure    Objective     Vital Signs past 24hrs    BP range: BP: ()/() 88/55  Pulse range: Heart Rate:  [] 80  Resp rate range: Resp:  [20-25] 20  Temp range: Temp (24hrs), Av.6 °F (37 °C), Min:98 °F (36.7 °C), Max:99.2 °F (37.3 °C)      Ventilator/Non-Invasive Ventilation Settings (From admission, onward)     Start     Ordered    20 0312  Ventilator - AC/VC+; 20; 90%; 5; 500  Continuous     Question Answer Comment   Vent Mode AC/VC+    Breath rate 20    FiO2 titrate for Sp02% =/> 90%    PEEP 5    Tidal Volume 500        20 0313                Device (Oxygen Therapy): ventilator FiO2 (%): 35 %     80.2 kg (176 lb 12.9 oz); Body mass index is 30.35 kg/m².      Intake/Output Summary (Last 24 hours) at 2020 1406  Last data filed at 2020 1300  Gross per 24 hour   Intake 3408 ml   Output 3725 ml   Net -317 ml       PHYSICAL EXAM   Constitutional: Middle aged pt in bed, sedated on the mechanical ventilator  Head: - NCAT  Eyes: No pallor, Anicteric conjunctiva, EOMI.  ENMT:  ETT+, no oral thrush. Moist MM.   NECK: Trachea midline, No thyromegaly, no palpable cervical LNpathy  Heart: RRR, no murmur. No pedal edema   Lungs: ANGLE +, No wheezes/ crackles heard    Abdomen: Soft. No tenderness, guarding or rigidity. No palpable masses  Extremities: Extremities warm and well perfused. No cyanosis/ clubbing  Neuro: Sedated, no gross focal neuro deficits  Psych: Mood and affect UTO    Scheduled meds:    albuterol      albuterol sulfate HFA 2 puff Inhalation 4x Daily - RT   azithromycin 500 mg Intravenous Q24H   budesonide-formoterol 2 puff Inhalation BID - RT   cefTRIAXone 1 g Intravenous Q24H   dexamethasone 4 mg Intravenous Q8H   enoxaparin 40 mg Subcutaneous Q12H   famotidine 20 mg Intravenous Daily   insulin aspart 0-7 Units Subcutaneous TID AC   lisinopril 10 mg Oral Daily   sodium chloride 10 mL Intravenous Q12H       IV meds:                        midazolam 1 mg/hr Last  Rate: 1 mg/hr (09/07/20 1352)   propofol 5-50 mcg/kg/min Last Rate: 50 mcg/kg/min (09/07/20 1030)       Data Review:      Results from last 7 days   Lab Units 09/07/20  0422 09/06/20  0239 09/05/20  0532 09/04/20  2316   SODIUM mmol/L 138 141 138 138   POTASSIUM mmol/L 3.1* 4.1 3.5 3.6   CHLORIDE mmol/L 105 106 103 95*   CO2 mmol/L 24.0 23.0 23.0 18.0*   BUN mg/dL 21 15 15 10   CREATININE mg/dL 0.82 0.87 0.99 1.13*   CALCIUM mg/dL 8.3* 9.3 9.2 9.4   BILIRUBIN mg/dL  --   --   --  0.5   ALK PHOS U/L  --   --   --  115   ALT (SGPT) U/L  --   --   --  22   AST (SGOT) U/L  --   --   --  30   GLUCOSE mg/dL 109* 141* 180* 234*   WBC 10*3/mm3 10.86* 17.89* 11.68* 15.90*   HEMOGLOBIN g/dL 13.6 15.6 14.6 16.2*   PLATELETS 10*3/mm3 275 296 297 363   PROBNP pg/mL  --   --   --  146.8   PROCALCITONIN ng/mL  --  0.97*  --   --        Lab Results   Component Value Date    CALCIUM 8.3 (L) 09/07/2020    PHOS 3.7 09/07/2020       Results from last 7 days   Lab Units 09/05/20  1611 09/05/20  0531 09/05/20  0046   BLOODCX   --  No growth at 2 days No growth at 2 days   RESPCX  Scant growth (1+) Normal Respiratory Cele: NO S.aureus/MRSA or Pseudomonas aeruginosa  --   --        Results from last 7 days   Lab Units 09/05/20  0453 09/05/20  0008   PH, ARTERIAL pH units 7.385 7.318*   PO2 ART mm Hg 92.5 176.0*   PCO2, ARTERIAL mm Hg 38.4 45.3*   HCO3 ART mmol/L 23.0 23.2        Results Review:    I have reviewed the relevant laboratory results and independently reviewed the chest imaging from this hospitalization including the available echocardiogram reports personally and summarized it if/ when appropriate below    Assessment    Acute hypoxic/hypercapnic respiratory failure s/p mechanical ventilation 9/5   Post extubation stridor - needing reintubation 9/7  Bibasilar pneumonia  Sepsis  Acute on chronic congestive heart failure  COVID-19 negative x2  Mild lactic acidosis  Suspected drug abuse  Remote breast cancer  Chronic pain syndrome  on narcotics  Remote tobacco abuse    PLAN:  Patient was doing better this morning and on minimal ventilator support.  Opening eyes and nodding to questions but not completely following commands.  Was on significant sedation yesterday which has been tapered off and propofol was discontinued and patient extubated successfully but had post extubation stridor.  We attempted nebulizer treatment as well as racemic epi and observe the patient closely for several minutes but patient did not make progress and was getting increasingly anxious and short of breath at which point it was decided to reintubate the patient.  Patient started on steroids for suspected vocal cord edema.  Continue with antibiotics course.  Noted elevated procalcitonin.  Agree with diuretics to keep her negative.  Nurse reports suspected drug abuse with significant family concerns and hence we will get a UDS.  Continue with sedation as well as PRN narcotics  Full code    DVT/GI prophylaxis    CC-45 mins    I have discussed my findings and recommendations with patient, nursing staff and primary care team.     Feng Valverde MD  9/7/2020    Electronically signed by Feng Valverde MD at 09/07/20 6745

## 2020-09-09 NOTE — PLAN OF CARE
Problem: Patient Care Overview  Goal: Plan of Care Review  Outcome: Ongoing (interventions implemented as appropriate)  Flowsheets  Taken 9/9/2020 0524  Progress: no change  Outcome Summary: no changes thru night. pt unable to follow commands this AM. PRN meds given this shift for agitation. pt vital signs stable. urine out put adeqaute. will continue to monitor.  Taken 9/8/2020 2000  Plan of Care Reviewed With: massey (ren)

## 2020-09-09 NOTE — ACP (ADVANCE CARE PLANNING)
Discussed advance care planning with patient and granddaughter.  Patient completed a living will directive.  Copy sent to HIM to scan into patient's record.

## 2020-09-10 PROBLEM — J18.9 PNEUMONIA OF BOTH LUNGS DUE TO INFECTIOUS ORGANISM: Status: ACTIVE | Noted: 2020-09-10

## 2020-09-10 LAB
ANION GAP SERPL CALCULATED.3IONS-SCNC: 14 MMOL/L (ref 5–15)
BACTERIA SPEC AEROBE CULT: NORMAL
BACTERIA SPEC AEROBE CULT: NORMAL
BASOPHILS # BLD AUTO: 0.04 10*3/MM3 (ref 0–0.2)
BASOPHILS NFR BLD AUTO: 0.4 % (ref 0–1.5)
BUN SERPL-MCNC: 25 MG/DL (ref 8–23)
BUN/CREAT SERPL: 29.1 (ref 7–25)
CALCIUM SPEC-SCNC: 8.8 MG/DL (ref 8.6–10.5)
CHLORIDE SERPL-SCNC: 106 MMOL/L (ref 98–107)
CO2 SERPL-SCNC: 24 MMOL/L (ref 22–29)
CREAT SERPL-MCNC: 0.86 MG/DL (ref 0.57–1)
DEPRECATED RDW RBC AUTO: 43.3 FL (ref 37–54)
EOSINOPHIL # BLD AUTO: 0.11 10*3/MM3 (ref 0–0.4)
EOSINOPHIL NFR BLD AUTO: 1 % (ref 0.3–6.2)
ERYTHROCYTE [DISTWIDTH] IN BLOOD BY AUTOMATED COUNT: 13.2 % (ref 12.3–15.4)
GFR SERPL CREATININE-BSD FRML MDRD: 66 ML/MIN/1.73
GLUCOSE BLDC GLUCOMTR-MCNC: 128 MG/DL (ref 70–130)
GLUCOSE BLDC GLUCOMTR-MCNC: 189 MG/DL (ref 70–130)
GLUCOSE BLDC GLUCOMTR-MCNC: 83 MG/DL (ref 70–130)
GLUCOSE SERPL-MCNC: 91 MG/DL (ref 65–99)
HCT VFR BLD AUTO: 43.5 % (ref 34–46.6)
HGB BLD-MCNC: 14.6 G/DL (ref 12–15.9)
IMM GRANULOCYTES # BLD AUTO: 0.11 10*3/MM3 (ref 0–0.05)
IMM GRANULOCYTES NFR BLD AUTO: 1 % (ref 0–0.5)
LYMPHOCYTES # BLD AUTO: 4.7 10*3/MM3 (ref 0.7–3.1)
LYMPHOCYTES NFR BLD AUTO: 44.7 % (ref 19.6–45.3)
MAGNESIUM SERPL-MCNC: 2.2 MG/DL (ref 1.6–2.4)
MCH RBC QN AUTO: 30.2 PG (ref 26.6–33)
MCHC RBC AUTO-ENTMCNC: 33.6 G/DL (ref 31.5–35.7)
MCV RBC AUTO: 89.9 FL (ref 79–97)
MONOCYTES # BLD AUTO: 0.89 10*3/MM3 (ref 0.1–0.9)
MONOCYTES NFR BLD AUTO: 8.5 % (ref 5–12)
NEUTROPHILS NFR BLD AUTO: 4.66 10*3/MM3 (ref 1.7–7)
NEUTROPHILS NFR BLD AUTO: 44.4 % (ref 42.7–76)
NRBC BLD AUTO-RTO: 0 /100 WBC (ref 0–0.2)
PHOSPHATE SERPL-MCNC: 4.2 MG/DL (ref 2.5–4.5)
PLATELET # BLD AUTO: 307 10*3/MM3 (ref 140–450)
PMV BLD AUTO: 10 FL (ref 6–12)
POTASSIUM SERPL-SCNC: 3.5 MMOL/L (ref 3.5–5.2)
POTASSIUM SERPL-SCNC: 4.4 MMOL/L (ref 3.5–5.2)
RBC # BLD AUTO: 4.84 10*6/MM3 (ref 3.77–5.28)
SODIUM SERPL-SCNC: 144 MMOL/L (ref 136–145)
WBC # BLD AUTO: 10.51 10*3/MM3 (ref 3.4–10.8)

## 2020-09-10 PROCEDURE — 25010000002 CEFTRIAXONE PER 250 MG: Performed by: INTERNAL MEDICINE

## 2020-09-10 PROCEDURE — 99232 SBSQ HOSP IP/OBS MODERATE 35: CPT | Performed by: INTERNAL MEDICINE

## 2020-09-10 PROCEDURE — 94799 UNLISTED PULMONARY SVC/PX: CPT

## 2020-09-10 PROCEDURE — 97166 OT EVAL MOD COMPLEX 45 MIN: CPT

## 2020-09-10 PROCEDURE — 82962 GLUCOSE BLOOD TEST: CPT

## 2020-09-10 PROCEDURE — 93005 ELECTROCARDIOGRAM TRACING: CPT | Performed by: INTERNAL MEDICINE

## 2020-09-10 PROCEDURE — 84100 ASSAY OF PHOSPHORUS: CPT | Performed by: INTERNAL MEDICINE

## 2020-09-10 PROCEDURE — 25010000002 ENOXAPARIN PER 10 MG: Performed by: INTERNAL MEDICINE

## 2020-09-10 PROCEDURE — 97162 PT EVAL MOD COMPLEX 30 MIN: CPT

## 2020-09-10 PROCEDURE — 25010000002 FUROSEMIDE PER 20 MG: Performed by: INTERNAL MEDICINE

## 2020-09-10 PROCEDURE — 80048 BASIC METABOLIC PNL TOTAL CA: CPT | Performed by: INTERNAL MEDICINE

## 2020-09-10 PROCEDURE — 84132 ASSAY OF SERUM POTASSIUM: CPT | Performed by: STUDENT IN AN ORGANIZED HEALTH CARE EDUCATION/TRAINING PROGRAM

## 2020-09-10 PROCEDURE — 85025 COMPLETE CBC W/AUTO DIFF WBC: CPT | Performed by: INTERNAL MEDICINE

## 2020-09-10 PROCEDURE — 94760 N-INVAS EAR/PLS OXIMETRY 1: CPT

## 2020-09-10 PROCEDURE — 93010 ELECTROCARDIOGRAM REPORT: CPT | Performed by: INTERNAL MEDICINE

## 2020-09-10 PROCEDURE — 92526 ORAL FUNCTION THERAPY: CPT | Performed by: SPEECH-LANGUAGE PATHOLOGIST

## 2020-09-10 PROCEDURE — 83735 ASSAY OF MAGNESIUM: CPT | Performed by: INTERNAL MEDICINE

## 2020-09-10 RX ORDER — BENZONATATE 100 MG/1
100 CAPSULE ORAL 3 TIMES DAILY PRN
Status: DISCONTINUED | OUTPATIENT
Start: 2020-09-10 | End: 2020-09-12 | Stop reason: HOSPADM

## 2020-09-10 RX ORDER — AMLODIPINE BESYLATE 10 MG/1
10 TABLET ORAL
Status: DISCONTINUED | OUTPATIENT
Start: 2020-09-10 | End: 2020-09-12 | Stop reason: HOSPADM

## 2020-09-10 RX ORDER — HYDROXYZINE HYDROCHLORIDE 25 MG/1
25 TABLET, FILM COATED ORAL 3 TIMES DAILY PRN
Status: DISCONTINUED | OUTPATIENT
Start: 2020-09-10 | End: 2020-09-12 | Stop reason: HOSPADM

## 2020-09-10 RX ADMIN — FAMOTIDINE 20 MG: 10 INJECTION INTRAVENOUS at 08:11

## 2020-09-10 RX ADMIN — AMLODIPINE BESYLATE 10 MG: 10 TABLET ORAL at 10:59

## 2020-09-10 RX ADMIN — SODIUM CHLORIDE, PRESERVATIVE FREE 10 ML: 5 INJECTION INTRAVENOUS at 20:00

## 2020-09-10 RX ADMIN — ENOXAPARIN SODIUM 40 MG: 40 INJECTION SUBCUTANEOUS at 20:00

## 2020-09-10 RX ADMIN — POTASSIUM CHLORIDE 40 MEQ: 1.5 POWDER, FOR SOLUTION ORAL at 11:00

## 2020-09-10 RX ADMIN — BUDESONIDE 0.5 MG: 0.5 INHALANT RESPIRATORY (INHALATION) at 11:41

## 2020-09-10 RX ADMIN — SODIUM CHLORIDE, PRESERVATIVE FREE 10 ML: 5 INJECTION INTRAVENOUS at 08:11

## 2020-09-10 RX ADMIN — BENZONATATE 100 MG: 100 CAPSULE ORAL at 10:59

## 2020-09-10 RX ADMIN — BENZONATATE 100 MG: 100 CAPSULE ORAL at 19:52

## 2020-09-10 RX ADMIN — HYDROXYZINE HYDROCHLORIDE 25 MG: 25 TABLET, FILM COATED ORAL at 21:35

## 2020-09-10 RX ADMIN — CEFTRIAXONE 1 G: 1 INJECTION, POWDER, FOR SOLUTION INTRAMUSCULAR; INTRAVENOUS at 00:10

## 2020-09-10 RX ADMIN — FUROSEMIDE 20 MG: 10 INJECTION, SOLUTION INTRAMUSCULAR; INTRAVENOUS at 08:11

## 2020-09-10 RX ADMIN — DOXYCYCLINE 100 MG: 100 CAPSULE ORAL at 08:10

## 2020-09-10 RX ADMIN — IPRATROPIUM BROMIDE AND ALBUTEROL SULFATE 3 ML: 2.5; .5 SOLUTION RESPIRATORY (INHALATION) at 11:41

## 2020-09-10 RX ADMIN — QUETIAPINE FUMARATE 50 MG: 25 TABLET ORAL at 06:11

## 2020-09-10 RX ADMIN — POTASSIUM CHLORIDE 40 MEQ: 1.5 POWDER, FOR SOLUTION ORAL at 06:10

## 2020-09-10 RX ADMIN — LISINOPRIL 10 MG: 10 TABLET ORAL at 08:11

## 2020-09-10 RX ADMIN — HYDROXYZINE HYDROCHLORIDE 25 MG: 25 TABLET, FILM COATED ORAL at 15:10

## 2020-09-10 RX ADMIN — DOXYCYCLINE 100 MG: 100 CAPSULE ORAL at 20:00

## 2020-09-10 RX ADMIN — ENOXAPARIN SODIUM 40 MG: 40 INJECTION SUBCUTANEOUS at 08:10

## 2020-09-10 RX ADMIN — IPRATROPIUM BROMIDE AND ALBUTEROL SULFATE 3 ML: 2.5; .5 SOLUTION RESPIRATORY (INHALATION) at 07:30

## 2020-09-10 NOTE — PROGRESS NOTES
[facility] Medicine Services  INPATIENT PROGRESS NOTE    Length of Stay: 5  Date of Admission: 9/4/2020  Primary Care Physician: Pat Montiel APRN    Subjective   Chief Complaint: Respiratory failure.    HPI: Patient is seen for follow-up.  She is a 64-year-old female with history of nicotine dependence, chronic pain syndrome, chronic bronchitis, hypertension who was admitted for acute hypoxic/hypercapnic respiratory failure requiring endotracheal intubation. Extubated 9/9/2020.   Complains of some soreness but no shortness of breath.  Does have a cough. She did not take lisinopril outpatient and this hospitalization was the first time she has taken that medication. She reports she has not been to the doctor in a long time.  Has some anxiety.     Review of Systems   Constitutional: Negative for chills, diaphoresis and fever.   HENT: Negative for sneezing and sore throat.    Eyes: Negative for pain and discharge.   Respiratory: Negative for cough and shortness of breath.    Gastrointestinal: Negative for constipation, diarrhea, nausea and vomiting.   Endocrine: Negative for cold intolerance and heat intolerance.   Genitourinary: Negative for difficulty urinating, dysuria, frequency and urgency.   Musculoskeletal: Negative for arthralgias and myalgias.   Skin: Negative for color change and pallor.   Allergic/Immunologic: Negative for environmental allergies and food allergies.   Neurological: Negative for dizziness, syncope and weakness.   Psychiatric/Behavioral: Negative for confusion and sleep disturbance.       Objective    Temp:  [97.5 °F (36.4 °C)-98.5 °F (36.9 °C)] 98.1 °F (36.7 °C)  Heart Rate:  [] 103  Resp:  [18-23] 22  BP: (116-185)/() 141/76    Vent Settings:       Physical Exam   Constitutional: She appears well-developed and well-nourished. She is cooperative. No distress.   HENT:   Head: Normocephalic and atraumatic.   Right Ear:  External ear normal.   Left Ear: External ear normal.   Nose: Nose normal.   Mouth/Throat: Oropharynx is clear and moist.   Eyes: Conjunctivae and EOM are normal.   Neck: Neck supple. No JVD present. No thyromegaly present.   Cardiovascular: Normal rate, regular rhythm, normal heart sounds and intact distal pulses. Exam reveals no gallop and no friction rub.   No murmur heard.  Pulmonary/Chest: Effort normal. No stridor. No respiratory distress. She has decreased breath sounds. She has no wheezes. She has rhonchi. She has no rales. She exhibits no tenderness.   Abdominal: Soft. Bowel sounds are normal. She exhibits no distension and no mass. There is no tenderness. There is no rebound and no guarding. No hernia.   Musculoskeletal: She exhibits no edema, tenderness or deformity.   Neurological: She has normal reflexes. She exhibits normal muscle tone.   Skin: Skin is warm and dry. No rash noted. She is not diaphoretic. No erythema. No pallor.   Nursing note and vitals reviewed.        Medication Review:    Current Facility-Administered Medications:   •  acetaminophen (TYLENOL) tablet 650 mg, 650 mg, Oral, Q6H PRN, Pat Naranjo MD, 650 mg at 09/09/20 1245  •  amLODIPine (NORVASC) tablet 10 mg, 10 mg, Oral, Q24H, Pat Naranjo MD  •  benzonatate (TESSALON) capsule 100 mg, 100 mg, Oral, TID PRN, Pat Naranjo MD  •  budesonide (PULMICORT) nebulizer solution 0.5 mg, 0.5 mg, Nebulization, BID - RT, Feng Valverde MD, 0.5 mg at 09/09/20 1927  •  cefTRIAXone (ROCEPHIN) 1 g/100 mL 0.9% NS (MBP), 1 g, Intravenous, Q24H, Eulogio Gonzalez MD, 1 g at 09/10/20 0010  •  dextrose (D50W) 25 g/ 50mL Intravenous Solution 25 g, 25 g, Intravenous, Q15 Min PRN, Dagoberto Crum MD  •  dextrose (GLUTOSE) oral gel 15 g, 15 g, Oral, Q15 Min PRN, Dagoberto Crum MD  •  doxycycline (MONODOX) capsule 100 mg, 100 mg, Oral, Q12H, Pat Naranjo MD, 100 mg at 09/10/20 0810  •  enalaprilat  (VASOTEC) injection 1.25 mg, 1.25 mg, Intravenous, Q6H PRN, Eulogio Gonzalez MD  •  enoxaparin (LOVENOX) syringe 40 mg, 40 mg, Subcutaneous, Q12H, Dagoberto Crum MD, 40 mg at 09/10/20 0810  •  famotidine (PEPCID) injection 20 mg, 20 mg, Intravenous, Daily, Eulogio Gonzalez MD, 20 mg at 09/10/20 0811  •  furosemide (LASIX) injection 20 mg, 20 mg, Intravenous, Daily, Feng Valverde MD, 20 mg at 09/10/20 0811  •  glucagon (human recombinant) (GLUCAGEN DIAGNOSTIC) injection 1 mg, 1 mg, Subcutaneous, Q15 Min PRN, Dagoberto Crum MD  •  insulin aspart (novoLOG) injection 0-7 Units, 0-7 Units, Subcutaneous, TID AC, Dagoberto Crum MD, 2 Units at 09/09/20 1812  •  ipratropium-albuterol (DUO-NEB) nebulizer solution 3 mL, 3 mL, Nebulization, 4x Daily - RT, Feng Valverde MD, 3 mL at 09/10/20 0730  •  midazolam (VERSED) injection 2 mg, 2 mg, Intravenous, Q1H PRN, Feng Valverde MD, 2 mg at 09/09/20 0801  •  potassium chloride (KLOR-CON) packet 40 mEq, 40 mEq, Oral, PRN, Preet Chaidez DO, 40 mEq at 09/10/20 0610  •  QUEtiapine (SEROquel) tablet 50 mg, 50 mg, Oral, Q8H, Feng Valverde MD, 50 mg at 09/10/20 0611  •  sodium chloride 0.9 % flush 10 mL, 10 mL, Intravenous, PRN, Raghav Lopez MD, 10 mL at 09/05/20 2149  •  sodium chloride 0.9 % flush 10 mL, 10 mL, Intravenous, Q12H, Dagoberto Crum MD, 10 mL at 09/10/20 0811  •  sodium chloride 0.9 % flush 10 mL, 10 mL, Intravenous, PRN, Dagoberto Crum MD    Results Review:  I have reviewed the labs, radiology results, and diagnostic studies.    Laboratory Data:   Results from last 7 days   Lab Units 09/10/20  0329 09/09/20  0536 09/08/20  0546  09/04/20  2316   SODIUM mmol/L 144 137 137   < > 138   POTASSIUM mmol/L 3.5 3.9 4.0   < > 3.6   CHLORIDE mmol/L 106 105 104   < > 95*   CO2 mmol/L 24.0 23.0 23.0   < > 18.0*   BUN mg/dL 25* 32* 25*   < > 10   CREATININE mg/dL 0.86 0.71 0.74   < > 1.13*   GLUCOSE mg/dL  91 112* 153*   < > 234*   CALCIUM mg/dL 8.8 8.7 8.8   < > 9.4   BILIRUBIN mg/dL  --   --   --   --  0.5   ALK PHOS U/L  --   --   --   --  115   ALT (SGPT) U/L  --   --   --   --  22   AST (SGOT) U/L  --   --   --   --  30   ANION GAP mmol/L 14.0 9.0 10.0   < > 25.0*    < > = values in this interval not displayed.     Estimated Creatinine Clearance: 66.9 mL/min (by C-G formula based on SCr of 0.86 mg/dL).  Results from last 7 days   Lab Units 09/10/20  0329 09/09/20  0536 09/08/20  0546   MAGNESIUM mg/dL 2.2 2.4 2.3   PHOSPHORUS mg/dL 4.2 3.0 3.3         Results from last 7 days   Lab Units 09/10/20  0329 09/09/20  0536 09/08/20  0509 09/07/20  0422 09/06/20  0239   WBC 10*3/mm3 10.51 9.47 12.13* 10.86* 17.89*   HEMOGLOBIN g/dL 14.6 13.3 14.7 13.6 15.6   HEMATOCRIT % 43.5 40.0 43.6 39.6 46.8*   PLATELETS 10*3/mm3 307 296 272 275 296           Culture Data:   No results found for: BLOODCX  No results found for: URINECX  No results found for: RESPCX  No results found for: WOUNDCX  No results found for: STOOLCX  No components found for: BODYFLD    Radiology Data:   Imaging Results (Last 24 Hours)     ** No results found for the last 24 hours. **          ABG:  Results from last 7 days   Lab Units 09/08/20  0650   PH, ARTERIAL pH units 7.456*   PO2 ART mm Hg 77.3*   PCO2, ARTERIAL mm Hg 35.4   HCO3 ART mmol/L 24.9       I have reviewed the patient's current medications.     Assessment/Plan     Hospital Problem List:  Active Problems:    Respiratory failure with hypoxia and hypercapnia (CMS/HCC)    Acute respiratory failure with hypoxia and hypercapnia (secondary to bilateral pneumonia to rule out COVID-19 viral infection): Pulm is following. Extubated 9/9/2020. Continue antibiotics. Tessalon pearls for cough.    Sepsis secondary to bilateral pneumonia (to rule out COVID-19 viral infection):  Blood cultures have shown no growth.  Lactic acidosis has resolved.     Azotemia: Resolved.    Anxiety: start Atarax, needs  outpatient follow up.     Elevated d-dimer is likely due to acute illness and CT pulmonary angiogram is negative for pulmonary embolism.    Hyperglycemia is likely due to acute illness as patient is not a known diabetic.  Hemoglobin A1c is 5.90.  Continue Accu-Cheks and sliding scale insulin.    Hypertension: Stable. Stop Lisinopril and start Norvasc. Monitor BP     Nicotine dependence: Continue nicotine patch.      Nutrition: advance diet as tolerated     Continue GI and DVT prophylaxis.        Discharge Planning: In progress. Transfer to the floor today.    Pat Naranjo MD   09/10/20   10:00

## 2020-09-10 NOTE — PLAN OF CARE
Problem: Patient Care Overview  Goal: Plan of Care Review  Outcome: Ongoing (interventions implemented as appropriate)  Flowsheets (Taken 9/10/2020 1223)  Progress: improving  Plan of Care Reviewed With: caregiver; patient  Outcome Summary: Pt extubated and po diet started after SLP eval.  She reports eating well pta.  Will monitor po intake.  Pt made aware of snacks that are available in the pantry prn.

## 2020-09-10 NOTE — PLAN OF CARE
"  Problem: Patient Care Overview  Goal: Plan of Care Review  Outcome: Ongoing (interventions implemented as appropriate)  Flowsheets (Taken 9/10/2020 0840)  Progress: improving  Plan of Care Reviewed With: patient  Outcome Summary: dysphagia therapy: pt tolerates mech soft solids and thin liquids with no overt s/s of aspiration.  she continues to hack/cough (before and during PO) d/t feeling as if something is stuck in her throat.  this vocal abuse is not a good habit to form and pt was educated.  but said \"I can't help it\"  recommend:   upgrade pt to regular diet consistency   f/u for additional session  rule out candida as source of throat clearing,     "

## 2020-09-10 NOTE — PROGRESS NOTES
Discharge Planning Assessment  [facility]     Patient Name: Theresa Esquivel  MRN: [id number]  Today's Date: 9/10/2020    Admit Date: 9/4/2020    Discharge Needs Assessment     Row Name 09/10/20 1341       Living Environment    Lives With  alone    Current Living Arrangements  home/apartment/condo    Primary Care Provided by  self    Provides Primary Care For  no one    Caregiving Concerns  lives alone,  dtr has small children that she is caring for    Quality of Family Relationships  helpful    Able to Return to Prior Arrangements  yes    Living Arrangement Comments  lives alone, was independent prior to admission       Resource/Environmental Concerns    Resource/Environmental Concerns  none    Transportation Concerns  car, none       Transition Planning    Patient/Family Anticipates Transition to  home    Patient/Family Anticipated Services at Transition  none    Transportation Anticipated  car, drives self;family or friend will provide       Discharge Needs Assessment    Concerns to be Addressed  denies needs/concerns at this time    Concerns Comments  pending therapy recommendations.     Equipment Currently Used at Home  none    Anticipated Changes Related to Illness  none    Equipment Needed After Discharge  -- uncertain at this time    Discharge Coordination/Progress  pt lives alone,  has transportation and rx coverage.   no needs anticipated.  will be based on therapy recommendations.          Discharge Plan     Row Name 09/10/20 1329       Plan    Plan Comments  pt under aerosol precautions.  unable to reach pt or family for lace assessment  janell amaya rn        Destination      Coordination has not been started for this encounter.      Durable Medical Equipment      Coordination has not been started for this encounter.      Dialysis/Infusion      Coordination has not been started for this encounter.      Home Medical Care      Coordination has not been started for this encounter.      Therapy       Coordination has not been started for this encounter.      Community Resources      Coordination has not been started for this encounter.        Expected Discharge Date and Time     Expected Discharge Date Expected Discharge Time    Sep 15, 2020         Demographic Summary     Row Name 09/10/20 4693       General Information    Admission Type  inpatient    Arrived From  home    Referral Source  high risk screening    Reason for Consult  discharge planning    Preferred Language  English     Used During This Interaction  no    General Information Comments  pt under aerosol precautions,  unable to reach pt in her room,  spoke with dtr Connie, who is her next of kin and listed on her emergency contact sheet        Functional Status    No documentation.       Psychosocial    No documentation.       Abuse/Neglect    No documentation.       Legal    No documentation.       Substance Abuse    No documentation.       Patient Forms    No documentation.           Martha Bingham, RN

## 2020-09-10 NOTE — PLAN OF CARE
Problem: Patient Care Overview  Goal: Plan of Care Review  Outcome: Ongoing (interventions implemented as appropriate)  Flowsheets  Taken 9/10/2020 1223 by Sabina Messer RD  Progress: improving  Taken 9/10/2020 1342 by Altagracia Rowan OT  Plan of Care Reviewed With: patient  Outcome Summary: OT eval completed; co-eval with PT. Pt pleasant and cooperative. Pt performed supine>sit with min A, sit<>stand with CGA using RW, and amb to chair with RW and CGA. Pt verbalized increased anxiety with participation due to fear of falling, but very appreciative to be up sitting in a chair. Pt presents overall deconditioned with BUE weakness (most prominant in shlds), decreased balance, and decreased activity tolerance limiting independence in ADLs. Pt would benefit from continued skilled OT services to address deficits and promote return to highest level of functioning. Pt lives alone but reports she plans to d/c to her [facility] home. Anticipate home with 24/7 assist and  OT at d/c.  Note:   Transfer orders received awaiting an open bed. Patient VSS, afebrile. Anxious at times, orders for hydroxyzine received. See eMAR. Daughter in to visit. Per patient it is ok to speak with daughter about patient care and she is aware of password.   Goal: Individualization and Mutuality  Outcome: Ongoing (interventions implemented as appropriate)  Goal: Discharge Needs Assessment  Outcome: Ongoing (interventions implemented as appropriate)  Flowsheets (Taken 9/10/2020 1341 by Martha Bingham, RN)  Equipment Needed After Discharge: -- (uncertain at this time)  Discharge Coordination/Progress: pt lives alone,  has transportation and rx coverage.   no needs anticipated.  will be based on therapy recommendations.    Equipment Currently Used at Home: none  Anticipated Changes Related to Illness: none  Concerns Comments: pending therapy recommendations.   Transportation Anticipated: car, drives self;family or friend will provide  Transportation  Concerns: car, none  Concerns to be Addressed: denies needs/concerns at this time  Patient/Family Anticipated Services at Transition: none  Patient/Family Anticipates Transition to: home  Goal: Interprofessional Rounds/Family Conf  Outcome: Ongoing (interventions implemented as appropriate)     Problem: Skin Injury Risk (Adult)  Goal: Identify Related Risk Factors and Signs and Symptoms  Outcome: Ongoing (interventions implemented as appropriate)  Flowsheets (Taken 9/10/2020 1806)  Related Risk Factors (Skin Injury Risk): medical devices; critical care admission  Goal: Skin Health and Integrity  Outcome: Ongoing (interventions implemented as appropriate)  Flowsheets (Taken 9/10/2020 1806)  Skin Health and Integrity: making progress toward outcome     Problem: Fall Risk (Adult)  Goal: Identify Related Risk Factors and Signs and Symptoms  Outcome: Ongoing (interventions implemented as appropriate)  Flowsheets (Taken 9/10/2020 1806)  Related Risk Factors (Fall Risk): environment unfamiliar; slippery/uneven surfaces; sleep pattern alteration; gait/mobility problems; depression/anxiety  Signs and Symptoms (Fall Risk): presence of risk factors  Goal: Absence of Fall  Outcome: Ongoing (interventions implemented as appropriate)  Flowsheets (Taken 9/10/2020 1806)  Absence of Fall: making progress toward outcome     Problem: Pain, Chronic (Adult)  Goal: Identify Related Risk Factors and Signs and Symptoms  Outcome: Ongoing (interventions implemented as appropriate)  Goal: Acceptable Pain/Comfort Level and Functional Ability  Outcome: Ongoing (interventions implemented as appropriate)  Flowsheets (Taken 9/10/2020 1806)  Acceptable Pain/Comfort Level and Functional Ability: making progress toward outcome     Problem: Pneumonia (Adult)  Goal: Signs and Symptoms of Listed Potential Problems Will be Absent, Minimized or Managed (Pneumonia)  Outcome: Ongoing (interventions implemented as appropriate)  Flowsheets (Taken 9/10/2020  8445)  Problems Assessed (Pneumonia): respiratory compromise  Problems Present (Pneumonia): infection progression

## 2020-09-10 NOTE — THERAPY EVALUATION
Patient Name: Theresa Esquivel  : 1955    MRN: [id number]                              Today's Date: 9/10/2020     Initial Evaluation  Admit Date: 2020    Visit Dx:     ICD-10-CM ICD-9-CM   1. Respiratory failure with hypoxia and hypercapnia, unspecified chronicity (CMS/HCC) J96.91 518.81    J96.92    2. Pneumonia of both lungs due to infectious organism, unspecified part of lung J18.9 483.8   3. Elevated d-dimer R79.89 790.92   4. Pharyngeal dysphagia R13.13 787.23   5. Impaired functional mobility, balance, gait, and endurance Z74.09 V49.89     Patient Active Problem List   Diagnosis   • Respiratory failure with hypoxia and hypercapnia (CMS/HCC)   • Pneumonia of both lungs due to infectious organism     Past Medical History:   Diagnosis Date   • Acute bronchitis, unspecified    • Acute exacerbation of chronic bronchitis (CMS/HCC)    • Acute exacerbation of chronic bronchitis (CMS/HCC)    • Acute frontal sinusitis, unspecified    • Acute laryngitis    • Allergic rhinitis    • Anxiety    • Cancer (CMS/HCC)    • Chronic back pain    • Encounter for therapeutic drug monitoring    • Family history of polyps in the colon    • H/O bone density study 2014   • H/O mammogram 2014   • Hyperlipidemia    • Hypertension    • Malignant neoplasm of female breast (CMS/HCC)      L breast, sp mastectomy      • Osteopenia    • Panic disorder    • Right lower quadrant pain     rule out appendicitis        Past Surgical History:   Procedure Laterality Date   • INJECTION OF MEDICATION  2016    Ramon(3)   • MASTECTOMY  2003    left breast for cancer   • PAP SMEAR  2013    negative for malignancy     General Information     Row Name 09/10/20 0940          PT Evaluation Time/Intention    Document Type  evaluation  -KW     Mode of Treatment  physical therapy;occupational therapy;co-treatment  -KW     Row Name 09/10/20 0940          General Information    Patient Profile Reviewed?  yes  -KW     Prior Level  "of Function  independent:  -KW     Existing Precautions/Restrictions  fall  -KW     Barriers to Rehab  cognitive status  -KW     Row Name 09/10/20 0940          Relationship/Environment    Lives With  alone;other (see comments) but states she will go stay with her daughter upon arrival  -KW     Row Name 09/10/20 0940          Resource/Environmental Concerns    Current Living Arrangements  home/apartment/condo  -KW     Row Name 09/10/20 0940          Home Main Entrance    Number of Stairs, Main Entrance  none  -KW     Row Name 09/10/20 0940          Stairs Within Home, Primary    Stairs, Within Home, Primary  niether pt nor dtr's home have stairs; no AD/DME  -KW     Number of Stairs, Within Home, Primary  none  -KW     Row Name 09/10/20 0940          Cognitive Assessment/Intervention- PT/OT    Orientation Status (Cognition)  person;place;situation pt stated \"1996\"  -KW     Row Name 09/10/20 0940          Safety Issues, Functional Mobility    Safety Issues Affecting Function (Mobility)  ability to follow commands;at risk behavior observed;awareness of need for assistance;insight into deficits/self awareness;judgment;positioning of assistive device;safety precaution awareness;problem solving;safety precautions follow-through/compliance;sequencing abilities  -KW     Impairments Affecting Function (Mobility)  balance;cognition;coordination;endurance/activity tolerance;motor planning;shortness of breath;strength  -KW     Comment, Safety Issues/Impairments (Mobility)  Pt demonstrated difficulty following motor commands during eval.  -KW       User Key  (r) = Recorded By, (t) = Taken By, (c) = Cosigned By    Initials Name Provider Type    KW Georgi Rowe, PT Physical Therapist        Mobility     Row Name 09/10/20 0940          Bed Mobility Assessment/Treatment    Bed Mobility Assessment/Treatment  supine-sit  -KW     Supine-Sit [place] (Bed Mobility)  minimum assist (75% patient effort);1 person assist  -KW     " Assistive Device (Bed Mobility)  head of bed elevated;bed rails  -KW     Row Name 09/10/20 0940          Transfer Assessment/Treatment    Comment (Transfers)  Pt very fearful of performing sit>stand and ambulating to chair but demonstrated good ability  -KW     Row Name 09/10/20 0940          Sit-Stand Transfer    Sit-Stand [place] (Transfers)  contact guard;verbal cues  -KW     Assistive Device (Sit-Stand Transfers)  walker, front-wheeled  -KW     Row Name 09/10/20 0940          Gait/Stairs Assessment/Training    Gait/Stairs Assessment/Training  gait/ambulation independence;gait/ambulation assistive device;distance ambulated  -KW     [place] Level (Gait)  contact guard;1 person assist  -KW     Assistive Device (Gait)  walker, front-wheeled  -KW     Distance in Feet (Gait)  3' + pivot  -KW       User Key  (r) = Recorded By, (t) = Taken By, (c) = Cosigned By    Initials Name Provider Type    KW Georgi Rowe, PT Physical Therapist        Obj/Interventions     Row Name 09/10/20 0940          General ROM    GENERAL ROM COMMENTS  BLE ROM WFL  -KW     Row Name 09/10/20 0940          MMT (Manual Muscle Testing)    General MMT Comments  BLE MMT 5/5 grossly  -KW     Row Name 09/10/20 0940          Static Sitting Balance    Level of [place] (Unsupported Sitting, Static Balance)  supervision  -KW     Sitting Position (Unsupported Sitting, Static Balance)  sitting on edge of bed  -KW     Time Able to Maintain Position (Unsupported Sitting, Static Balance)  more than 5 minutes  -KW     Row Name 09/10/20 0940          Static Standing Balance    Level of [place] (Supported Standing, Static Balance)  contact guard assist;1 person assist  -KW     Time Able to Maintain Position (Supported Standing, Static Balance)  1 to 2 minutes  -KW     Assistive Device Utilized (Supported Standing, Static Balance)  walker, rolling  -KW     Row Name 09/10/20 0940          Sensory Assessment/Intervention    Sensory General  Assessment  no sensation deficits identified  -KW       User Key  (r) = Recorded By, (t) = Taken By, (c) = Cosigned By    Initials Name Provider Type    KW Georgi Rowe, PT Physical Therapist        Goals/Plan     Row Name 09/10/20 0940          Bed Mobility Goal 1 (PT)    Activity/Assistive Device (Bed Mobility Goal 1, PT)  bed mobility activities, all  -KW     [place] Level/Cues Needed (Bed Mobility Goal 1, PT)  independent  -KW     Time Frame (Bed Mobility Goal 1, PT)  long term goal (LTG);by discharge  -KW     Progress/Outcomes (Bed Mobility Goal 1, PT)  goal not met  -KW     Row Name 09/10/20 0940          Transfer Goal 1 (PT)    Activity/Assistive Device (Transfer Goal 1, PT)  sit-to-stand/stand-to-sit;walker, rolling  -KW     [place] Level/Cues Needed (Transfer Goal 1, PT)  conditional independence;independent  -KW     Time Frame (Transfer Goal 1, PT)  long term goal (LTG);by discharge  -KW     Progress/Outcome (Transfer Goal 1, PT)  goal not met  -KW     Row Name 09/10/20 0940          Gait Training Goal 1 (PT)    Activity/Assistive Device (Gait Training Goal 1, PT)  gait (walking locomotion);assistive device use;decrease fall risk;improve balance and speed;increase endurance/gait distance;walker, rolling  -KW     [place] Level (Gait Training Goal 1, PT)  conditional independence;independent  -KW     Distance (Gait Goal 1, PT)  150' x2 or more  -KW     Time Frame (Gait Training Goal 1, PT)  long term goal (LTG);by discharge  -KW     Progress/Outcome (Gait Training Goal 1, PT)  goal not met  -KW       User Key  (r) = Recorded By, (t) = Taken By, (c) = Cosigned By    Initials Name Provider Type    KW Georgi Rowe, PT Physical Therapist        Clinical Impression     Row Name 09/10/20 0940          Pain Assessment    Additional Documentation  Pain Scale: Numbers Pre/Post-Treatment (Group)  -KW     Row Name 09/10/20 0940          Pain Scale: Numbers Pre/Post-Treatment    Pain Scale: Numbers,  Pretreatment  0/10 - no pain  -KW     Pain Scale: Numbers, Post-Treatment  0/10 - no pain  -KW     Pre/Post Treatment Pain Comment  Pt states her anxiety levels are high  -KW     Pain Intervention(s)  Medication (See MAR);Ambulation/increased activity;Repositioned  -KW     Row Name 09/10/20 0940          Plan of Care Review    Plan of Care Reviewed With  patient  -KW     Row Name 09/10/20 0940          Physical Therapy Clinical Impression    Criteria for Skilled Interventions Met (PT Clinical Impression)  yes;treatment indicated  -KW     Rehab Potential (PT Clinical Summary)  good, to achieve stated therapy goals  -KW     Row Name 09/10/20 0940          Vital Signs    Pre Systolic BP Rehab  182 upper arm  -KW     Pre Treatment Diastolic BP  108  -KW     Intra Systolic BP Rehab  136 repositioned and taken on forearm  -KW     Intra Treatment Diastolic BP  84  -KW     Post Systolic BP Rehab  156  -KW     Post Treatment Diastolic BP  98  -KW     Pretreatment Heart Rate (beats/min)  100  -KW     Posttreatment Heart Rate (beats/min)  102  -KW     Pre SpO2 (%)  93  -KW     O2 Delivery Pre Treatment  nasal cannula 2L  -KW     Intra SpO2 (%)  98  -KW     Post SpO2 (%)  98  -KW     O2 Delivery Post Treatment  nasal cannula upper arm  -KW     Pre Patient Position  Supine  -KW     Intra Patient Position  Supine forearm  -KW     Post Patient Position  Sitting  -KW     Recovery Time  --  -KW     Row Name 09/10/20 0940          Positioning and Restraints    Pre-Treatment Position  in bed  -KW     Post Treatment Position  chair  -KW     In Chair  notified nsg;reclined;call light within reach;encouraged to call for assist  -KW       User Key  (r) = Recorded By, (t) = Taken By, (c) = Cosigned By    Initials Name Provider Type    Georgi Parikh, VERO Physical Therapist        Outcome Measures     Row Name 09/10/20 0940          How much help from another person do you currently need...    Turning from your back to your side while in  flat bed without using bedrails?  3  -KW     Moving from lying on back to sitting on the side of a flat bed without bedrails?  3  -KW     Moving to and from a bed to a chair (including a wheelchair)?  3  -KW     Standing up from a chair using your arms (e.g., wheelchair, bedside chair)?  3  -KW     Climbing 3-5 steps with a railing?  3  -KW     To walk in hospital room?  3  -KW     AM-PAC 6 Clicks Score (PT)  18  -KW     Row Name 09/10/20 0943          Functional Assessment    Outcome Measure Options  AM-PAC 6 Clicks Basic Mobility (PT)  -KW       User Key  (r) = Recorded By, (t) = Taken By, (c) = Cosigned By    Initials Name Provider Type    Georgi Parikh PT Physical Therapist        Physical Therapy Education                 Title: PT OT SLP Therapies (In Progress)     Topic: Physical Therapy (In Progress)     Point: Mobility training (Done)     Description:   Instruct learner(s) on safety and technique for assisting patient out of bed, chair or wheelchair.  Instruct in the proper use of assistive devices, such as walker, crutches, cane or brace.              Patient Friendly Description:   It's important to get you on your feet again, but we need to do so in a way that is safe for you. Falling has serious consequences, and your personal safety is the most important thing of all.        When it's time to get out of bed, one of us or a family member will sit next to you on the bed to give you support.     If your doctor or nurse tells you to use a walker, crutches, a cane, or a brace, be sure you use it every time you get out of bed, even if you think you don't need it.    Learning Progress Summary           Patient Acceptance, E, NR,VU by DULCE at 9/10/2020 0288    Comment:  PT POC and prognosis; benefits of using RW while recovering; safe hand placement with FWW                   Point: Home exercise program (Not Started)     Description:   Instruct learner(s) on appropriate technique for monitoring, assisting  and/or progressing patient with therapeutic exercises and activities.              Learner Progress:   Not documented in this visit.          Point: Body mechanics (Done)     Description:   Instruct learner(s) on proper positioning and spine alignment for patient and/or caregiver during mobility tasks and/or exercises.              Learning Progress Summary           Patient Acceptance, E, NR,VU by  at 9/10/2020 1306    Comment:  PT POC and prognosis; benefits of using RW while recovering; safe hand placement with FWW                   Point: Precautions (Done)     Description:   Instruct learner(s) on prescribed precautions during mobility and gait tasks              Learning Progress Summary           Patient Acceptance, E, NR,VU by DULCE at 9/10/2020 1306    Comment:  PT POC and prognosis; benefits of using RW while recovering; safe hand placement with FWW                               User Key     Initials Effective Dates Name Provider Type Discipline     08/09/20 -  Georgi Rowe PT Physical Therapist PT              PT Recommendation and Plan  Planned Therapy Interventions (PT Eval): balance training, bed mobility training, gait training, home exercise program, patient/family education, stair training, ROM (range of motion), strengthening, stretching, transfer training, neuromuscular re-education  Outcome Summary/Treatment Plan (PT)  Anticipated Equipment Needs at Discharge (PT): front wheeled walker  Anticipated Discharge Disposition (PT): home with assist  Plan of Care Reviewed With: patient  Outcome Summary: PT eval complete; co-eval with OT. Pt pleasant and agreeable to PT/OT. BLE ROM and MMT WFL. Pt demonstrated some difficulty following motor commands throughout eval. Requiring min Ax1 for sup>sit. Pt very fearful of performing sit>stand and ambulating to chair but demonstrated good ability; CGA and FWW; VC required for safe hand placement during sit>stand. VSS. Goals established. Recommend home with  assist upon d/c from facility (pt states she will be staying with her dtr). RW would be beneficial for improved safety, stability and pt comfort to decrease potential fall risk. Continue skilled PT.     Time Calculation:   PT Charges     Row Name 09/10/20 1310             Time Calculation    Start Time  0940  -KW      Stop Time  1018  -KW      Time Calculation (min)  38 min  -KW      PT Received On  09/10/20  -KW      PT Goal Re-Cert Due Date  09/23/20  -KW        User Key  (r) = Recorded By, (t) = Taken By, (c) = Cosigned By    Initials Name Provider Type    Georgi Parikh, PT Physical Therapist        Therapy Charges for Today     Code Description Service Date Service Provider Modifiers Qty    [id number] HC PT EVAL MOD COMPLEXITY 3 9/10/2020 Georgi Rowe PT GP 1          PT G-Codes  Outcome Measure Options: AM-PAC 6 Clicks Basic Mobility (PT)  AM-PAC 6 Clicks Score (PT): 18    Georgi Rowe PT  9/10/2020

## 2020-09-10 NOTE — PLAN OF CARE
Problem: Patient Care Overview  Goal: Plan of Care Review  Outcome: Ongoing (interventions implemented as appropriate)  Flowsheets (Taken 9/10/2020 2245)  Outcome Summary: PT eval complete; co-eval with OT. Pt pleasant and agreeable to PT/OT. BLE ROM and MMT WFL. Pt demonstrated some difficulty following motor commands throughout eval. Requiring min Ax1 for sup>sit. Pt very fearful of performing sit>stand and ambulating to chair but demonstrated good ability; CGA and FWW; VC required for safe hand placement during sit>stand. VSS. Goals established. Recommend home with assist upon d/c from facility (pt states she will be staying with her dtr). RW would be beneficial for improved safety, stability and pt comfort to decrease potential fall risk. Continue skilled PT.

## 2020-09-10 NOTE — THERAPY EVALUATION
Acute Care - Occupational Therapy Initial Evaluation  [facility]     Patient Name: Theresa Esquivel  : 1955  MRN: [id number]  Today's Date: 9/10/2020  Onset of Illness/Injury or Date of Surgery: 20     Referring Physician: Dr. Naranjo    Admit Date: 2020       ICD-10-CM ICD-9-CM   1. Respiratory failure with hypoxia and hypercapnia, unspecified chronicity (CMS/HCC) J96.91 518.81    J96.92    2. Pneumonia of both lungs due to infectious organism, unspecified part of lung J18.9 483.8   3. Elevated d-dimer R79.89 790.92   4. Pharyngeal dysphagia R13.13 787.23   5. Impaired functional mobility, balance, gait, and endurance Z74.09 V49.89   6. Impaired mobility and ADLs Z74.09 V49.89    Z78.9      Patient Active Problem List   Diagnosis   • Respiratory failure with hypoxia and hypercapnia (CMS/HCC)   • Pneumonia of both lungs due to infectious organism     Past Medical History:   Diagnosis Date   • Acute bronchitis, unspecified    • Acute exacerbation of chronic bronchitis (CMS/HCC)    • Acute exacerbation of chronic bronchitis (CMS/HCC)    • Acute frontal sinusitis, unspecified    • Acute laryngitis    • Allergic rhinitis    • Anxiety    • Cancer (CMS/HCC)    • Chronic back pain    • Encounter for therapeutic drug monitoring    • Family history of polyps in the colon    • H/O bone density study 2014   • H/O mammogram 2014   • Hyperlipidemia    • Hypertension    • Malignant neoplasm of female breast (CMS/HCC)      L breast, sp mastectomy      • Osteopenia    • Panic disorder    • Right lower quadrant pain     rule out appendicitis        Past Surgical History:   Procedure Laterality Date   • INJECTION OF MEDICATION  2016    Ramon(3)   • MASTECTOMY  2003    left breast for cancer   • PAP SMEAR  2013    negative for malignancy          OT ASSESSMENT FLOWSHEET (last 12 hours)      Occupational Therapy Evaluation     Row Name 09/10/20 0941                   OT Evaluation  Time/Intention    Subjective Information  complains of anxiety  -AS        Document Type  evaluation  -AS        Mode of Treatment  co-treatment;occupational therapy;physical therapy  -AS        Total Evaluation Minutes, Occupational Therapy  38  -AS        Patient Effort  good  -AS           General Information    Patient Profile Reviewed?  yes  -AS        Onset of Illness/Injury or Date of Surgery  09/05/20  -AS        Referring Physician  Dr. Naranjo  -AS        Patient Observations  alert;cooperative;agree to therapy  -AS        Patient/Family Observations  no family present  -AS        General Observations of Patient  supine in bed, O2 (2LPM), tele  -AS        Prior Level of Function  independent:;gait;transfer;all household mobility;community mobility;home management;cooking;cleaning  -AS        Equipment Currently Used at Home  none  -AS        Existing Precautions/Restrictions  fall  -AS        Limitations/Impairments  safety/cognitive  -AS        Equipment Issued to Patient  gait belt  -AS        Risks Reviewed  patient:;LOB;nausea/vomiting;dizziness;increased discomfort;change in vital signs;increased drainage;lines disloged  -AS        Benefits Reviewed  patient:;improve function;increase independence;increase strength;increase balance;decrease risk of DVT;decrease pain;improve skin integrity;increase knowledge  -AS           Relationship/Environment    Primary Source of Support/Comfort  child(sabrina) dght  -AS        Lives With  alone  -AS        Concerns About Impact on Relationships  P  -AS           Resource/Environmental Concerns    Current Living Arrangements  home/apartment/condo  -AS           Home Main Entrance    Number of Stairs, Main Entrance  none  -AS           Stairs Within Home, Primary    Stairs, Within Home, Primary  pt and pt dght home has no stairs  -AS        Number of Stairs, Within Home, Primary  none  -AS           Cognitive Assessment/Intervention- PT/OT    Orientation Status  "(Cognition)  oriented to;person;situation stated year \"1996\"  -AS           Safety Issues, Functional Mobility    Impairments Affecting Function (Mobility)  balance;cognition;coordination;endurance/activity tolerance;motor planning;shortness of breath;strength  -AS           Bed Mobility Assessment/Treatment    Bed Mobility Assessment/Treatment  supine-sit  -AS        Supine-Sit Howe (Bed Mobility)  minimum assist (75% patient effort)  -AS        Assistive Device (Bed Mobility)  head of bed elevated;bed rails  -AS           Functional Mobility    Functional Mobility- Ind. Level  contact guard assist  -AS        Functional Mobility- Device  rolling walker  -AS        Functional Mobility- Comment  amb >chair  -AS           Transfer Assessment/Treatment    Transfer Assessment/Treatment  sit-stand transfer;stand-sit transfer  -AS           Sit-Stand Transfer    Sit-Stand Howe (Transfers)  contact guard;verbal cues  -AS        Assistive Device (Sit-Stand Transfers)  walker, front-wheeled  -AS           Stand-Sit Transfer    Stand-Sit Howe (Transfers)  contact guard  -AS        Assistive Device (Stand-Sit Transfers)  walker, front-wheeled  -AS           ADL Assessment/Intervention    BADL Assessment/Intervention  lower body dressing  -AS           Lower Body Dressing Assessment/Training    Lower Body Dressing Howe Level  don;socks;maximum assist (25% patient effort)  -AS        Lower Body Dressing Position  supine  -AS        Comment (Lower Body Dressing)  Pt able to cross legs; OT initated start of sock and pt able to pull to ankle  -AS           BADL Safety/Performance    Impairments, BADL Safety/Performance  balance;cognition;endurance/activity tolerance;strength;range of motion  -AS           General ROM    GENERAL ROM COMMENTS  BUE AROM WFL w exception of [place], approx 90 deg AROM, PROM WFL  -AS           MMT (Manual Muscle Testing)    General MMT Comments  [place] 3-/5; all " else 3+/5   -AS           Static Sitting Balance    Level of [place] (Unsupported Sitting, Static Balance)  supervision  -AS        Sitting Position (Unsupported Sitting, Static Balance)  sitting on edge of bed  -AS        Time Able to Maintain Position (Unsupported Sitting, Static Balance)  more than 5 minutes  -AS           Static Standing Balance    Level of [place] (Supported Standing, Static Balance)  contact guard assist;1 person assist  -AS        Time Able to Maintain Position (Supported Standing, Static Balance)  1 to 2 minutes  -AS        Assistive Device Utilized (Supported Standing, Static Balance)  walker, rolling  -AS           Sensory Assessment/Intervention    Sensory General Assessment  no sensation deficits identified  -AS           Positioning and Restraints    Pre-Treatment Position  in bed  -AS        Post Treatment Position  chair  -AS        In Chair  notified nsg;reclined;call light within reach;encouraged to call for assist  -AS           Pain Assessment    Additional Documentation  Pain Scale: Numbers Pre/Post-Treatment (Group)  -AS           Pain Scale: Numbers Pre/Post-Treatment    Pain Scale: Numbers, Pretreatment  0/10 - no pain  -AS        Pain Scale: Numbers, Post-Treatment  0/10 - no pain  -AS        Pre/Post Treatment Pain Comment  Pt states her anxiety levels are high  -AS        Pain Intervention(s)  Medication (See MAR);Ambulation/increased activity;Repositioned  -AS           Plan of Care Review    Plan of Care Reviewed With  patient  -AS           Clinical Impression (OT)    OT Diagnosis  impaired mobility and ADLs  -AS        Patient/Family Goals Statement (OT Eval)  return home  -AS        Criteria for Skilled Therapeutic Interventions Met (OT Eval)  yes;treatment indicated  -AS        Rehab Potential (OT Eval)  good, to achieve stated therapy goals  -AS        Therapy Frequency (OT Eval)  other (see comments) 6-7 days/wk  -AS        Predicted Duration of Therapy  Intervention (Therapy Eval)  until goals met or d/c from facility  -AS        Care Plan Review (OT)  evaluation/treatment results reviewed;care plan/treatment goals reviewed;risks/benefits reviewed;current/potential barriers reviewed;patient/other agree to care plan  -AS        Anticipated Discharge Disposition (OT)  home with /7 care;home with home health  -AS           Vital Signs    Pre Systolic BP Rehab  182 upper arm; cuff repositioned and BP retaken  -AS        Pre Treatment Diastolic BP  108  -AS        Intra Systolic BP Rehab  136 repositioned and taken on   -AS        Intra Treatment Diastolic BP  84  -AS        Post Systolic BP Rehab  146  -AS        Post Treatment Diastolic BP  87  -AS        Pretreatment Heart Rate (beats/min)  100  -AS        Posttreatment Heart Rate (beats/min)  105  -AS        Pre SpO2 (%)  93  -AS        O2 Delivery Pre Treatment  nasal cannula  -AS        Intra SpO2 (%)  98  -AS        O2 Delivery Intra Treatment  nasal cannula  -AS        Post SpO2 (%)  98  -AS        O2 Delivery Post Treatment  nasal cannula  -AS        Pre Patient Position  Supine  -AS        Intra Patient Position  Supine  -AS        Post Patient Position  Sitting  -AS           Planned OT Interventions    Planned Therapy Interventions (OT Eval)  activity tolerance training;adaptive equipment training;BADL retraining;functional balance retraining;neuromuscular control/coordination retraining;occupation/activity based interventions;patient/caregiver education/training;ROM/therapeutic exercise;strengthening exercise;transfer/mobility retraining  -AS           OT Goals    Transfer Goal Selection (OT)  transfer, OT goal 1  -AS        Bathing Goal Selection (OT)  bathing, OT goal 1  -AS        Dressing Goal Selection (OT)  dressing, OT goal 1  -AS        Toileting Goal Selection (OT)  toileting, OT goal 1  -AS        ROM Goal Selection (OT)  ROM, OT goal 1  -AS        Strength Goal Selection (OT)  strength, OT goal  1  -AS        Additional Documentation  Strength Goal Selection (OT) (Row);ROM Goal Selection (OT) (Row)  -AS           Transfer Goal 1 (OT)    Activity/Assistive Device (Transfer Goal 1, OT)  sit-to-stand/stand-to-sit;bed-to-chair/chair-to-bed;toilet  -AS        Rains Level/Cues Needed (Transfer Goal 1, OT)  supervision required  -AS        Time Frame (Transfer Goal 1, OT)  long term goal (LTG);by discharge  -AS        Progress/Outcome (Transfer Goal 1, OT)  goal not met  -AS           Bathing Goal 1 (OT)    Activity/Assistive Device (Bathing Goal 1, OT)  bathing skills, all  -AS        Rains Level/Cues Needed (Bathing Goal 1, OT)  supervision required;set-up required  -AS        Time Frame (Bathing Goal 1, OT)  long term goal (LTG);by discharge  -AS        Progress/Outcomes (Bathing Goal 1, OT)  goal not met  -AS           Dressing Goal 1 (OT)    Activity/Assistive Device (Dressing Goal 1, OT)  lower body dressing  -AS        Rains/Cues Needed (Dressing Goal 1, OT)  supervision required;set-up required  -AS        Time Frame (Dressing Goal 1, OT)  long term goal (LTG);by discharge  -AS        Progress/Outcome (Dressing Goal 1, OT)  goal not met  -AS           Toileting Goal 1 (OT)    Activity/Device (Toileting Goal 1, OT)  toileting skills, all  -AS        Rains Level/Cues Needed (Toileting Goal 1, OT)  conditional independence  -AS        Time Frame (Toileting Goal 1, OT)  long term goal (LTG);by discharge  -AS        Progress/Outcome (Toileting Goal 1, OT)  goal not met  -AS           ROM Goal 1 (OT)    ROM Goal 1 (OT)  Pt will increase bilat SF to 160 deg to benefit ADL participation.   -AS        Time Frame (ROM Goal 1, OT)  long term goal (LTG);by discharge  -AS        Progress/Outcome (ROM Goal 1, OT)  goal not met  -AS           Strength Goal 1 (OT)    Strength Goal 1 (OT)  Pt will complete 3 sets 10 reps of bilat UE exercises.   -AS        Time Frame (Strength Goal 1, OT)  long  term goal (LTG);by discharge  -AS        Progress/Outcome (Strength Goal 1, OT)  goal not met  -AS          User Key  (r) = Recorded By, (t) = Taken By, (c) = Cosigned By    Initials Name Effective Dates    AS Altagracia Rowan OT 07/05/20 -          Occupational Therapy Education                 Title: PT OT SLP Therapies (In Progress)     Topic: Occupational Therapy (In Progress)     Point: ADL training (Done)     Description:   Instruct learner(s) on proper safety adaptation and remediation techniques during self care or transfers.   Instruct in proper use of assistive devices.              Learning Progress Summary           Patient Acceptance, E, VU,NR by AS at 9/10/2020 1341    Comment:  role of OT, OT POC, ADL training, transfer training                   Point: Home exercise program (Not Started)     Description:   Instruct learner(s) on appropriate technique for monitoring, assisting and/or progressing therapeutic exercises/activities.              Learner Progress:   Not documented in this visit.          Point: Precautions (Done)     Description:   Instruct learner(s) on prescribed precautions during self-care and functional transfers.              Learning Progress Summary           Patient Acceptance, E, VU,NR by AS at 9/10/2020 1341    Comment:  role of OT, OT POC, ADL training, transfer training                   Point: Body mechanics (Not Started)     Description:   Instruct learner(s) on proper positioning and spine alignment during self-care, functional mobility activities and/or exercises.              Learner Progress:   Not documented in this visit.                      User Key     Initials Effective Dates Name Provider Type Discipline    AS 07/05/20 -  Altagracia Rowan OT Occupational Therapist OT                  OT Recommendation and Plan  Outcome Summary/Treatment Plan (OT)  Anticipated Discharge Disposition (OT): home with 24/7 care, home with home health  Planned Therapy Interventions  (OT Eval): activity tolerance training, adaptive equipment training, BADL retraining, functional balance retraining, neuromuscular control/coordination retraining, occupation/activity based interventions, patient/caregiver education/training, ROM/therapeutic exercise, strengthening exercise, transfer/mobility retraining  Therapy Frequency (OT Eval): other (see comments)(6-7 days/wk)  Plan of Care Review  Plan of Care Reviewed With: patient  Plan of Care Reviewed With: patient  Outcome Summary: OT eval completed; co-eval with PT. Pt pleasant and cooperative. Pt performed supine>sit with min A, sit<>stand with CGA using RW, and amb to chair with RW and CGA. Pt verbalized increased anxiety with participation due to fear of falling, but very appreciative to be up sitting in a chair. Pt presents overall deconditioned with BUE weakness (most prominant in shlds), decreased balance, and decreased activity tolerance limiting independence in ADLs. Pt would benefit from continued skilled OT services to address deficits and promote return to highest level of functioning. Pt lives alone but reports she plans to d/c to her [facility] home. Anticipate home with 24/7 assist and  OT at d/c.    Outcome Measures     Row Name 09/10/20 0941             How much help from another is currently needed...    Putting on and taking off regular lower body clothing?  2  -AS      Bathing (including washing, rinsing, and drying)  2  -AS      Toileting (which includes using toilet bed pan or urinal)  2  -AS      Putting on and taking off regular upper body clothing  2  -AS      Taking care of personal grooming (such as brushing teeth)  3  -AS      Eating meals  3  -AS      AM-PAC 6 Clicks Score (OT)  14  -AS         Functional Assessment    Outcome Measure Options  AM-PAC 6 Clicks Daily Activity (OT)  -AS        User Key  (r) = Recorded By, (t) = Taken By, (c) = Cosigned By    Initials Name Provider Type    AS Altagracia Rowan, OT Occupational  Therapist          Time Calculation:   Time Calculation- OT     Row Name 09/10/20 1349             Time Calculation- OT    OT Start Time  0940  -AS      OT Stop Time  1018  -AS      OT Time Calculation (min)  38 min  -AS      OT Received On  09/10/20  -AS      OT Goal Re-Cert Due Date  09/23/20  -AS        User Key  (r) = Recorded By, (t) = Taken By, (c) = Cosigned By    Initials Name Provider Type    AS Altagracia Rowan OT Occupational Therapist        Therapy Charges for Today     Code Description Service Date Service Provider Modifiers Qty    [id number]  OT EVAL MOD COMPLEXITY 3 9/10/2020 Altagracia Rowan OT GO 1               Altagracia Rowan OT  9/10/2020

## 2020-09-10 NOTE — PLAN OF CARE
Problem: Patient Care Overview  Goal: Plan of Care Review  Flowsheets (Taken 9/10/2020 7148)  Plan of Care Reviewed With: patient  Outcome Summary: OT eval completed; co-eval with PT. Pt pleasant and cooperative. Pt performed supine>sit with min A, sit<>stand with CGA using RW, and amb to chair with RW and CGA. Pt verbalized increased anxiety with participation due to fear of falling, but very appreciative to be up sitting in a chair. Pt presents overall deconditioned with BUE weakness (most prominant in shlds), decreased balance, and decreased activity tolerance limiting independence in ADLs. Pt would benefit from continued skilled OT services to address deficits and promote return to highest level of functioning. Pt lives alone but reports she plans to d/c to her [facility] home. Anticipate home with 24/7 assist and  OT at d/c.

## 2020-09-10 NOTE — PROGRESS NOTES
Feng Valverde MD                          [phone]      Patient ID:    Name:  Theresa Esquivel    MRN:  [id number]    1955   64 y.o.  female            Patient Care Team:  Pat Montiel APRN as PCP - General (Family Medicine)    CC/ Reason for visit: Pneumonia, respiratory failure, ventilator management    Subjective: Pt seen and examined this AM.  No acute overnight events.  Patient extubated and is currently on minimal supplemental oxygen.  No new or worsening cough or sputum production.  No fevers noted overnight.  Tolerating antibiotics and diuresing well.     ROS- denies any new fevers or chills worsening cough or sputum production nausea vomiting or diarrhea.  Tolerating p.o.    Objective     Vital Signs past 24hrs    BP range: BP: (108-187)/() 108/73  Pulse range: Heart Rate:  [] 97  Resp rate range: Resp:  [16-23] 18  Temp range: Temp (24hrs), Av.2 °F (36.8 °C), Min:97.5 °F (36.4 °C), Max:98.6 °F (37 °C)      Ventilator/Non-Invasive Ventilation Settings (From admission, onward)     Device (Oxygen Therapy): nasal cannula FiO2 (%): 35 %     78.2 kg (172 lb 6.4 oz); Body mass index is 29.59 kg/m².      Intake/Output Summary (Last 24 hours) at 9/10/2020 1248  Last data filed at 9/10/2020 0900  Gross per 24 hour   Intake 220 ml   Output 1775 ml   Net -1555 ml       PHYSICAL EXAM   Constitutional: Middle aged pt in in chair, appreciative the care   head: - NCAT  Eyes: No pallor, Anicteric conjunctiva, EOMI.  ENMT:  Mallampati 4, no oral thrush. Moist MM.   NECK: Trachea midline, No thyromegaly, no palpable cervical LNpathy  Heart: RRR, no murmur. No pedal edema   Lungs: ANGLE +, decreased breath sounds at the bases, no wheezes/ crackles heard    Abdomen: Soft. No tenderness, guarding or rigidity. No palpable masses  Extremities: Extremities warm and well perfused. No cyanosis/ clubbing  Neuro:  Awake, sitting up in a chair, answers questions  appropriately, no gross focal neuro deficits  Psych: Mood and affect -appropriate    Scheduled meds:      amLODIPine 10 mg Oral Q24H   budesonide 0.5 mg Nebulization BID - RT   cefTRIAXone 1 g Intravenous Q24H   doxycycline 100 mg Oral Q12H   enoxaparin 40 mg Subcutaneous Q12H   famotidine 20 mg Intravenous Daily   furosemide 20 mg Intravenous Daily   insulin aspart 0-7 Units Subcutaneous TID AC   ipratropium-albuterol 3 mL Nebulization 4x Daily - RT   QUEtiapine 50 mg Oral Q8H   sodium chloride 10 mL Intravenous Q12H       IV meds:                             Data Review:      Results from last 7 days   Lab Units 09/10/20  0329 09/09/20  0536 09/08/20  0546 09/08/20  0509  09/06/20  0239  09/04/20  2316   SODIUM mmol/L 144 137 137  --    < > 141   < > 138   POTASSIUM mmol/L 3.5 3.9 4.0  --    < > 4.1   < > 3.6   CHLORIDE mmol/L 106 105 104  --    < > 106   < > 95*   CO2 mmol/L 24.0 23.0 23.0  --    < > 23.0   < > 18.0*   BUN mg/dL 25* 32* 25*  --    < > 15   < > 10   CREATININE mg/dL 0.86 0.71 0.74  --    < > 0.87   < > 1.13*   CALCIUM mg/dL 8.8 8.7 8.8  --    < > 9.3   < > 9.4   BILIRUBIN mg/dL  --   --   --   --   --   --   --  0.5   ALK PHOS U/L  --   --   --   --   --   --   --  115   ALT (SGPT) U/L  --   --   --   --   --   --   --  22   AST (SGOT) U/L  --   --   --   --   --   --   --  30   GLUCOSE mg/dL 91 112* 153*  --    < > 141*   < > 234*   WBC 10*3/mm3 10.51 9.47  --  12.13*   < > 17.89*   < > 15.90*   HEMOGLOBIN g/dL 14.6 13.3  --  14.7   < > 15.6   < > 16.2*   PLATELETS 10*3/mm3 307 296  --  272   < > 296   < > 363   PROBNP pg/mL  --   --   --   --   --   --   --  146.8   PROCALCITONIN ng/mL  --   --   --   --   --  0.97*  --   --     < > = values in this interval not displayed.       Lab Results   Component Value Date    CALCIUM 8.8 09/10/2020    PHOS 4.2 09/10/2020       Results from last 7 days   Lab Units 09/05/20  1611 09/05/20  0531 09/05/20  0046   BLOODCX   --  No growth at 5 days No growth at  5 days   RESPCX  Scant growth (1+) Normal Respiratory Cele: NO S.aureus/MRSA or Pseudomonas aeruginosa  --   --        Results from last 7 days   Lab Units 09/08/20  0650 09/05/20  0453 09/05/20  0008   PH, ARTERIAL pH units 7.456* 7.385 7.318*   PO2 ART mm Hg 77.3* 92.5 176.0*   PCO2, ARTERIAL mm Hg 35.4 38.4 45.3*   HCO3 ART mmol/L 24.9 23.0 23.2        Results Review:    I have reviewed the relevant laboratory results and independently reviewed the chest imaging from this hospitalization including the available echocardiogram reports personally and summarized it if/ when appropriate below    Assessment    Acute hypoxic/hypercapnic respiratory failure s/p mechanical ventilation 9/5 -9/9  Persistent metabolic encephalopathy  Post extubation stridor - needing reintubation 9/7  Bibasilar pneumonia  Sepsis  Acute on chronic congestive heart failure  COVID-19 negative x2  Prolonged Qtc  Mild lactic acidosis  Suspected drug abuse  Remote breast cancer  Chronic pain syndrome on narcotics  Remote tobacco abuse    PLAN:  Patient successfully extubated now and does not have any mild stridor.  On minimal supplemental oxygen  Continue with antibiotics for pneumonia.  We will discontinue Seroquel as she is doing well and likely does not need antipsychotics  Wean off steroids.   Counseled about smoking cessation and need for compliance with medical recommendations and follow-up with primary care.    We will sign off and see as needed for any new pulmonary or critical care issues.    I have discussed my findings and recommendations with patient, nursing staff and primary care team.     Feng Valverde MD  9/10/2020

## 2020-09-10 NOTE — THERAPY TREATMENT NOTE
"Acute Care - Speech Language Pathology   Swallow Treatment Note [facility]     Patient Name: Theresa Esquivel  : 1955  MRN: [id number]  Today's Date: 9/10/2020               Admit Date: 2020  dysphagia therapy: pt indicates anxiety and not able to sleep last night d/t worry.  Pt tolerates mech soft solids and thin liquids with no overt s/s of aspiration.  she continues to hack/cough (before and during PO) d/t feeling as if something is stuck in her throat.  this vocal abuse is concerning as it is very constant.  Pt educated that this vocal abuse is not a good habit to form, but said, \"I can't help It.\" Overall intake is poor.  SLP assisted pt with meal plan for next 3 meals and called dietary to inform them of choices.      recommend:   1. upgrade pt to regular diet consistency   2. f/u for additional session  3. rule out candida as source of throat clearing  4. MD to address anxiety?   Pat Egan, CCC-SLP 9/10/2020 08:46      Visit Dx:      ICD-10-CM ICD-9-CM   1. Respiratory failure with hypoxia and hypercapnia, unspecified chronicity (CMS/HCC) J96.91 518.81    J96.92    2. Pneumonia of both lungs due to infectious organism, unspecified part of lung J18.9 483.8   3. Elevated d-dimer R79.89 790.92   4. Pharyngeal dysphagia R13.13 787.23     Patient Active Problem List   Diagnosis   • Respiratory failure with hypoxia and hypercapnia (CMS/HCC)       Therapy Treatment  Rehabilitation Treatment Summary     Row Name 09/10/20 0820             Treatment Time/Intention    Discipline  speech language pathologist  -EC      Document Type  therapy note (daily note)  -EC      Subjective Information  no complaints  -EC      Mode of Treatment  individual therapy  -EC      Patient/Family Observations  none  -EC      Care Plan Review  care plan/treatment goals reviewed  -EC      Therapy Frequency (Swallow)  1 day per week;2 days per week  -EC      Patient Effort  good  -EC      Comment  pt with hacking cough " before and during treatment  -EC      Recorded by [EC] Pat Egan CCC-SLP 09/10/20 0839      Row Name 09/10/20 0820             Positioning and Restraints    Pre-Treatment Position  in bed  -EC      Post Treatment Position  bed  -EC      In Bed  fowlers;sitting;call light within reach;encouraged to call for assist;exit alarm on;patient within staff view  -EC      Recorded by [EC] Pat Egan CCC-SLP 09/10/20 0839      Row Name 09/10/20 0820             Pain Scale: FACES Pre/Post-Treatment    Pain: FACES Scale, Pretreatment  0-->no hurt  -EC      Pain: FACES Scale, Post-Treatment  0-->no hurt  -EC      Recorded by [EC] Pat Egan CCC-SLP 09/10/20 0839        User Key  (r) = Recorded By, (t) = Taken By, (c) = Cosigned By    Initials Name Effective Dates Discipline    EC Pat Egan CCC-SLP 02/11/20 -  SLP          Outcome Summary         SLP GOALS     Row Name 09/10/20 0820 09/09/20 1330          Oral Nutrition/Hydration Goal 1 (SLP)    Oral Nutrition/Hydration Goal 1, SLP  pt to tolerate recommended diet for safe and adequate nutrition/hydration  -EC  pt to tolerate recommended diet for safe and adequate nutrition/hydration  -EC     Time Frame (Oral Nutrition/Hydration Goal 1, SLP)  by discharge  -EC  by discharge  -EC     Barriers (Oral Nutrition/Hydration Goal 1, SLP)  throat clear  -EC  throat clear  -EC     Progress/Outcomes (Oral Nutrition/Hydration Goal 1, SLP)  goal partially met  -EC  goal ongoing  -EC       User Key  (r) = Recorded By, (t) = Taken By, (c) = Cosigned By    Initials Name Provider Type    EC Pat Egan CCC-SLP Speech and Language Pathologist          EDUCATION  The patient has been educated in the following areas:   Dysphagia (Swallowing Impairment) vocal abuse.    SLP Recommendation and Plan                                Time Calculation:   Time Calculation- SLP     Row Name 09/10/20 0844             Time Calculation- SLP    SLP Start Time  0820   -EC      SLP Stop Time  0900  -EC      SLP Time Calculation (min)  40 min  -EC      Total Timed Code Minutes- SLP  40 minute(s)  -EC      SLP Received On  09/10/20  -EC        User Key  (r) = Recorded By, (t) = Taken By, (c) = Cosigned By    Initials Name Provider Type    EC Pat Egan CCC-SLP Speech and Language Pathologist          Therapy Charges for Today     Code Description Service Date Service Provider Modifiers Qty    [id number]  ST EVAL ORAL PHARYNG SWALLOW 2 9/9/2020 Pat Egan CCC-SLP GN 1    [id number]  ST TREATMENT SWALLOW 3 9/10/2020 Pat Egan CCC-SLP GN 1                 IHSAN Macario  9/10/2020

## 2020-09-11 LAB
ANION GAP SERPL CALCULATED.3IONS-SCNC: 13 MMOL/L (ref 5–15)
BASOPHILS # BLD AUTO: 0.06 10*3/MM3 (ref 0–0.2)
BASOPHILS NFR BLD AUTO: 0.6 % (ref 0–1.5)
BUN SERPL-MCNC: 20 MG/DL (ref 8–23)
BUN/CREAT SERPL: 25 (ref 7–25)
CALCIUM SPEC-SCNC: 9.2 MG/DL (ref 8.6–10.5)
CHLORIDE SERPL-SCNC: 101 MMOL/L (ref 98–107)
CO2 SERPL-SCNC: 25 MMOL/L (ref 22–29)
CREAT SERPL-MCNC: 0.8 MG/DL (ref 0.57–1)
DEPRECATED RDW RBC AUTO: 43.5 FL (ref 37–54)
EOSINOPHIL # BLD AUTO: 0.33 10*3/MM3 (ref 0–0.4)
EOSINOPHIL NFR BLD AUTO: 3 % (ref 0.3–6.2)
ERYTHROCYTE [DISTWIDTH] IN BLOOD BY AUTOMATED COUNT: 13.2 % (ref 12.3–15.4)
GFR SERPL CREATININE-BSD FRML MDRD: 72 ML/MIN/1.73
GLUCOSE BLDC GLUCOMTR-MCNC: 101 MG/DL (ref 70–130)
GLUCOSE BLDC GLUCOMTR-MCNC: 108 MG/DL (ref 70–130)
GLUCOSE BLDC GLUCOMTR-MCNC: 108 MG/DL (ref 70–130)
GLUCOSE BLDC GLUCOMTR-MCNC: 157 MG/DL (ref 70–130)
GLUCOSE SERPL-MCNC: 102 MG/DL (ref 65–99)
HCT VFR BLD AUTO: 46.2 % (ref 34–46.6)
HGB BLD-MCNC: 15.5 G/DL (ref 12–15.9)
IMM GRANULOCYTES # BLD AUTO: 0.25 10*3/MM3 (ref 0–0.05)
IMM GRANULOCYTES NFR BLD AUTO: 2.3 % (ref 0–0.5)
LYMPHOCYTES # BLD AUTO: 3.7 10*3/MM3 (ref 0.7–3.1)
LYMPHOCYTES NFR BLD AUTO: 34.2 % (ref 19.6–45.3)
MAGNESIUM SERPL-MCNC: 2.3 MG/DL (ref 1.6–2.4)
MCH RBC QN AUTO: 30 PG (ref 26.6–33)
MCHC RBC AUTO-ENTMCNC: 33.5 G/DL (ref 31.5–35.7)
MCV RBC AUTO: 89.5 FL (ref 79–97)
MONOCYTES # BLD AUTO: 0.98 10*3/MM3 (ref 0.1–0.9)
MONOCYTES NFR BLD AUTO: 9 % (ref 5–12)
NEUTROPHILS NFR BLD AUTO: 5.51 10*3/MM3 (ref 1.7–7)
NEUTROPHILS NFR BLD AUTO: 50.9 % (ref 42.7–76)
NRBC BLD AUTO-RTO: 0 /100 WBC (ref 0–0.2)
PHOSPHATE SERPL-MCNC: 3.9 MG/DL (ref 2.5–4.5)
PLATELET # BLD AUTO: 362 10*3/MM3 (ref 140–450)
PMV BLD AUTO: 10.1 FL (ref 6–12)
POTASSIUM SERPL-SCNC: 4.2 MMOL/L (ref 3.5–5.2)
RBC # BLD AUTO: 5.16 10*6/MM3 (ref 3.77–5.28)
SODIUM SERPL-SCNC: 139 MMOL/L (ref 136–145)
WBC # BLD AUTO: 10.83 10*3/MM3 (ref 3.4–10.8)

## 2020-09-11 PROCEDURE — 93010 ELECTROCARDIOGRAM REPORT: CPT | Performed by: INTERNAL MEDICINE

## 2020-09-11 PROCEDURE — 84100 ASSAY OF PHOSPHORUS: CPT | Performed by: INTERNAL MEDICINE

## 2020-09-11 PROCEDURE — 94799 UNLISTED PULMONARY SVC/PX: CPT

## 2020-09-11 PROCEDURE — 25010000002 ENOXAPARIN PER 10 MG: Performed by: INTERNAL MEDICINE

## 2020-09-11 PROCEDURE — 85025 COMPLETE CBC W/AUTO DIFF WBC: CPT | Performed by: INTERNAL MEDICINE

## 2020-09-11 PROCEDURE — 97110 THERAPEUTIC EXERCISES: CPT

## 2020-09-11 PROCEDURE — 92526 ORAL FUNCTION THERAPY: CPT | Performed by: SPEECH-LANGUAGE PATHOLOGIST

## 2020-09-11 PROCEDURE — 82962 GLUCOSE BLOOD TEST: CPT

## 2020-09-11 PROCEDURE — 83735 ASSAY OF MAGNESIUM: CPT | Performed by: INTERNAL MEDICINE

## 2020-09-11 PROCEDURE — 63710000001 PREDNISONE PER 1 MG: Performed by: STUDENT IN AN ORGANIZED HEALTH CARE EDUCATION/TRAINING PROGRAM

## 2020-09-11 PROCEDURE — 93005 ELECTROCARDIOGRAM TRACING: CPT | Performed by: INTERNAL MEDICINE

## 2020-09-11 PROCEDURE — 80048 BASIC METABOLIC PNL TOTAL CA: CPT | Performed by: INTERNAL MEDICINE

## 2020-09-11 PROCEDURE — 25010000002 CEFTRIAXONE PER 250 MG: Performed by: INTERNAL MEDICINE

## 2020-09-11 PROCEDURE — 97530 THERAPEUTIC ACTIVITIES: CPT

## 2020-09-11 PROCEDURE — 97116 GAIT TRAINING THERAPY: CPT

## 2020-09-11 RX ORDER — PREDNISONE 20 MG/1
40 TABLET ORAL
Status: DISCONTINUED | OUTPATIENT
Start: 2020-09-11 | End: 2020-09-12 | Stop reason: HOSPADM

## 2020-09-11 RX ORDER — LANOLIN ALCOHOL/MO/W.PET/CERES
3 CREAM (GRAM) TOPICAL NIGHTLY
Status: DISCONTINUED | OUTPATIENT
Start: 2020-09-11 | End: 2020-09-12 | Stop reason: HOSPADM

## 2020-09-11 RX ADMIN — MELATONIN 3 MG: 3 TAB ORAL at 21:23

## 2020-09-11 RX ADMIN — ENOXAPARIN SODIUM 40 MG: 40 INJECTION SUBCUTANEOUS at 09:16

## 2020-09-11 RX ADMIN — IPRATROPIUM BROMIDE AND ALBUTEROL SULFATE 3 ML: 2.5; .5 SOLUTION RESPIRATORY (INHALATION) at 15:55

## 2020-09-11 RX ADMIN — IPRATROPIUM BROMIDE AND ALBUTEROL SULFATE 3 ML: 2.5; .5 SOLUTION RESPIRATORY (INHALATION) at 20:27

## 2020-09-11 RX ADMIN — SODIUM CHLORIDE, PRESERVATIVE FREE 10 ML: 5 INJECTION INTRAVENOUS at 21:24

## 2020-09-11 RX ADMIN — SODIUM CHLORIDE, PRESERVATIVE FREE 10 ML: 5 INJECTION INTRAVENOUS at 09:18

## 2020-09-11 RX ADMIN — AMLODIPINE BESYLATE 10 MG: 10 TABLET ORAL at 09:17

## 2020-09-11 RX ADMIN — ENOXAPARIN SODIUM 40 MG: 40 INJECTION SUBCUTANEOUS at 21:24

## 2020-09-11 RX ADMIN — DOXYCYCLINE 100 MG: 100 CAPSULE ORAL at 21:23

## 2020-09-11 RX ADMIN — CEFTRIAXONE 1 G: 1 INJECTION, POWDER, FOR SOLUTION INTRAMUSCULAR; INTRAVENOUS at 00:06

## 2020-09-11 RX ADMIN — BUDESONIDE 0.5 MG: 0.5 INHALANT RESPIRATORY (INHALATION) at 20:27

## 2020-09-11 RX ADMIN — PREDNISONE 40 MG: 20 TABLET ORAL at 16:11

## 2020-09-11 RX ADMIN — DOXYCYCLINE 100 MG: 100 CAPSULE ORAL at 09:16

## 2020-09-11 RX ADMIN — FAMOTIDINE 20 MG: 10 INJECTION INTRAVENOUS at 09:17

## 2020-09-11 NOTE — THERAPY TREATMENT NOTE
Acute Care - Physical Therapy Treatment Note  [facility]     Patient Name: Theresa Esquivel  : 1955  MRN: [id number]  Today's Date: 2020  Onset of Illness/Injury or Date of Surgery: 20     Referring Physician: Dr. Naranjo    Admit Date: 2020    Visit Dx:    ICD-10-CM ICD-9-CM   1. Respiratory failure with hypoxia and hypercapnia, unspecified chronicity (CMS/HCC) J96.91 518.81    J96.92    2. Pneumonia of both lungs due to infectious organism, unspecified part of lung J18.9 483.8   3. Elevated d-dimer R79.89 790.92   4. Pharyngeal dysphagia R13.13 787.23   5. Impaired functional mobility, balance, gait, and endurance Z74.09 V49.89   6. Impaired mobility and ADLs Z74.09 V49.89    Z78.9      Patient Active Problem List   Diagnosis   • Respiratory failure with hypoxia and hypercapnia (CMS/HCC)   • Pneumonia of both lungs due to infectious organism       Therapy Treatment    Rehabilitation Treatment Summary     Row Name 20 1320 20 1027 20 0910       Treatment Time/Intention    Discipline  physical therapy assistant  -TW  occupational therapy assistant  -RC  speech language pathologist  -EC    Document Type  therapy note (daily note)  -TW  therapy note (daily note)  -RC2  discharge treatment  -EC    Subjective Information  complains of;weakness;fatigue  -TW  complains of;pain  -RC2  no complaints  -EC    Mode of Treatment  physical therapy;individual therapy  -TW  occupational therapy;individual therapy  -RC2  individual therapy;speech-language pathology  -EC    Patient/Family Observations  No visitors present.  -TW  no visitor present   -RC2  none present  -EC    Care Plan Review  --  --  care plan/treatment goals reviewed  -EC    Total Minutes, Occupational Therapy Treatment  --  20  -RC2  --    Therapy Frequency (OT Eval)  --  -- 6-7 days per week   -RC2  --    Therapy Frequency (Swallow)  --  --  1 day per week  -EC    Patient Effort  good  -TW  good  -RC2  excellent  -EC     Existing Precautions/Restrictions  fall  -TW  fall  -RC2  --    Recorded by [TW] Nato Andres, PTA 09/11/20 1601 [RC] Luzma Effie G, GILMAN/L 09/11/20 1034  [RC2] Effie Segal, GILMAN/L 09/11/20 1051 [EC] Pat Egan CCC-SLP 09/11/20 0936    Row Name 09/11/20 1320 09/11/20 1027          Vital Signs    Pre Systolic BP Rehab  --  151  -RC     Pre Treatment Diastolic BP  --  95  -RC     Post Systolic BP Rehab  --  167  -RC2     Post Treatment Diastolic BP  --  94  -RC2     Pretreatment Heart Rate (beats/min)  98  -TW  --     Intratreatment Heart Rate (beats/min)  --  94  -RC2     Posttreatment Heart Rate (beats/min)  102  -TW  --     Pre SpO2 (%)  96  -TW  97  -RC2     O2 Delivery Pre Treatment  room air  -TW  room air  -RC2     Post SpO2 (%)  97  -TW  97  -RC2     O2 Delivery Post Treatment  --  room air  -RC2     Pre Patient Position  Supine  -TW  --     Intra Patient Position  Standing  -TW  --     Post Patient Position  Sitting  -TW  --     Recorded by [TW] Nato Andres, PTA 09/11/20 1601 [RC] Effie Segal, GILMAN/L 09/11/20 1034  [RC2] Luzma Effie TEE, GILMAN/L 09/11/20 1055     Row Name 09/11/20 1320 09/11/20 1027          Cognitive Assessment/Intervention- PT/OT    Orientation Status (Cognition)  oriented x 4  -TW  oriented x 4  -RC     Recorded by [TW] Nato Andres, YE 09/11/20 1601 [RC] Luzma Effie G, GILMAN/L 09/11/20 1055     Row Name 09/11/20 1320             Bed Mobility Assessment/Treatment    Supine-Sit [place] (Bed Mobility)  contact guard  -TW      Assistive Device (Bed Mobility)  head of bed elevated  -TW      Comment (Bed Mobility)  Pt sat EOB to perform LE ther ex.  -TW      Recorded by [TW] Nato Andres, PTA 09/11/20 1601      Row Name 09/11/20 1027             Transfer Assessment/Treatment    Transfer Assessment/Treatment  -- chair to chair   -RC      Comment (Transfers)  cga  -RC      Recorded by [RC] Effie Segal COTA/L 09/11/20 7782      Row  Name 09/11/20 1320 09/11/20 1027          Sit-Stand Transfer    Sit-Stand [place] (Transfers)  contact guard  -TW  contact guard  -RC     Assistive Device (Sit-Stand Transfers)  walker, front-wheeled  -TW  --     Recorded by [TW] Nato Andres, PTA 09/11/20 1601 [RC] Effie Sgeal COTA/L 09/11/20 1055     Row Name 09/11/20 1320 09/11/20 1027          Stand-Sit Transfer    Stand-Sit [place] (Transfers)  contact guard  -TW  contact guard  -RC     Assistive Device (Stand-Sit Transfers)  walker, front-wheeled  -TW  --     Recorded by [TW] Nato Andres, PTA 09/11/20 1601 [RC] Effie Segal COTA/L 09/11/20 1055     Row Name 09/11/20 1320             Gait/Stairs Assessment/Training    Gait/Stairs Assessment/Training  gait/ambulation assistive device  -TW      [place] Level (Gait)  contact guard  -TW      Assistive Device (Gait)  walker, front-wheeled  -TW      Distance in Feet (Gait)  18ft   -TW      Deviations/Abnormal Patterns (Gait)  base of support, narrow;stride length decreased  -TW      Bilateral Gait Deviations  forward flexed posture  -TW      Recorded by [TW] Nato Andres, PTA 09/11/20 1601      Row Name 09/11/20 1027             Upper Extremity Seated Therapeutic Exercise    Performed, Seated Upper Extremity (Therapeutic Exercise)  shoulder flexion/extension;shoulder external/internal rotation;elbow flexion/extension  -RC      Exercise Type, Seated Upper Extremity (Therapeutic Exercise)  AROM (active range of motion)  -RC      Expected Outcomes, Seated Upper Extremity (Therapeutic Exercise)  improve functional tolerance, self-care activity;improve performance, BADLs  -RC      Sets/Reps Detail, Seated Upper Extremity (Therapeutic Exercise)  15  -RC      Recorded by [RC] Effie Segal COTA/L 09/11/20 1055      Row Name 09/11/20 1320             Lower Extremity Seated Therapeutic Exercise    Performed, Seated Lower Extremity (Therapeutic Exercise)  hip  flexion/extension;hip abduction/adduction;ankle dorsiflexion/plantarflexion;LAQ (long arc quad), knee extension  -TW      Exercise Type, Seated Lower Extremity (Therapeutic Exercise)  AROM (active range of motion)  -TW      Sets/Reps Detail, Seated Lower Extremity (Therapeutic Exercise)  1/10  -TW      Recorded by [TW] Nato Andres, YE 09/11/20 1601      Row Name 09/11/20 1320             Lower Extremity Supine Therapeutic Exercise    Performed, Supine Lower Extremity (Therapeutic Exercise)  gluteal sets;quadriceps sets;ankle dorsiflexion/plantarflexion;hip external/internal rotation;heel slides  -TW      Exercise Type, Supine Lower Extremity (Therapeutic Exercise)  AROM (active range of motion)  -TW      Sets/Reps Detail, Supine Lower Extremity (Therapeutic Exercise)  1/10  -TW      Recorded by [TW] Nato Andres, YE 09/11/20 1601      Row Name 09/11/20 1320             Static Sitting Balance    Level of [place] (Unsupported Sitting, Static Balance)  supervision  -TW      Sitting Position (Unsupported Sitting, Static Balance)  sitting on edge of bed  -TW      Time Able to Maintain Position (Unsupported Sitting, Static Balance)  more than 5 minutes  -TW      Recorded by [TW] Nato Andres PTA 09/11/20 1601      Row Name 09/11/20 1320             Static Standing Balance    Level of [place] (Supported Standing, Static Balance)  contact guard assist  -TW      Time Able to Maintain Position (Supported Standing, Static Balance)  1 to 2 minutes  -TW      Assistive Device Utilized (Supported Standing, Static Balance)  walker, rolling  -TW      Recorded by [TW] Nato Andres PTA 09/11/20 1601      Row Name 09/11/20 1320 09/11/20 1027 09/11/20 0910       Positioning and Restraints    Pre-Treatment Position  in bed  -TW  sitting in chair/recliner  -RC  sitting in chair/recliner  -EC    Post Treatment Position  chair  -TW  wheelchair  -RC  chair  -EC    In Chair  sitting;call light within  reach;encouraged to call for assist;exit alarm on;notified nsg  -TW  --  sitting;call light within reach;encouraged to call for assist;exit alarm on  -EC    In Wheelchair  --  with other staff MT taking to new room   -RC  --    Recorded by [TW] Nato Andres, PTA 09/11/20 1601 [RC] Effie Segal, GILMAN/L 09/11/20 1055 [EC] Pat Egan CCC-SLP 09/11/20 0936    Row Name 09/11/20 1320 09/11/20 1027 09/11/20 0910       Pain Scale: Numbers Pre/Post-Treatment    Pain Scale: Numbers, Pretreatment  4/10  -TW  6/10  -RC  0/10 - no pain  -EC    Pain Scale: Numbers, Post-Treatment  5/10  -TW  6/10  -RC2  0/10 - no pain  -EC    Pain Location - Orientation  lower  -TW  upper  -RC  --    Pain Location  extremity  -TW  extremity B  -RC  --    Pain Intervention(s)  --  Medication (See MAR)  -RC  --    Recorded by [TW] Nato Andres, PTA 09/11/20 1601 [RC] Effie Segal, GILMAN/L 09/11/20 1034  [RC2] Effie Segal, GILMAN/L 09/11/20 1055 [EC] Pat Egan CCC-SLP 09/11/20 0936    Row Name 09/11/20 1027             Outcome Summary/Treatment Plan (OT)    Daily Summary of Progress (OT)  progress toward functional goals as expected  -RC      Plan for Continued Treatment (OT)  cont poc   -RC      Recorded by [RC] Effie Segal, GILMAN/L 09/11/20 1055      Row Name 09/11/20 1320             Outcome Summary/Treatment Plan (PT)    Daily Summary of Progress (PT)  progress toward functional goals is good  -TW      Plan for Continued Treatment (PT)  Cont  -TW      Anticipated Discharge Disposition (PT)  anticipate therapy at next level of care  -TW      Recorded by [TW] Nato Andres, PTA 09/11/20 1601        User Key  (r) = Recorded By, (t) = Taken By, (c) = Cosigned By    Initials Name Effective Dates Discipline    EC Pat Egan CCC-SLP 02/11/20 -  SLP    Nato Bahena, YE 03/07/18 -  PT    Effie Garcia COTA/L 03/07/18 -  OT               Rehab Goal Summary     Row Name  09/11/20 1320 09/11/20 1027 09/11/20 0910       Bed Mobility Goal 1 (PT)    Activity/Assistive Device (Bed Mobility Goal 1, PT)  bed mobility activities, all  -TW  --  --    [place] Level/Cues Needed (Bed Mobility Goal 1, PT)  independent  -TW  --  --    Time Frame (Bed Mobility Goal 1, PT)  long term goal (LTG);by discharge  -TW  --  --    Progress/Outcomes (Bed Mobility Goal 1, PT)  goal not met  -TW  --  --       Transfer Goal 1 (PT)    Activity/Assistive Device (Transfer Goal 1, PT)  sit-to-stand/stand-to-sit;walker, rolling  -TW  --  --    [place] Level/Cues Needed (Transfer Goal 1, PT)  conditional independence;independent  -TW  --  --    Time Frame (Transfer Goal 1, PT)  long term goal (LTG);by discharge  -TW  --  --    Progress/Outcome (Transfer Goal 1, PT)  goal not met  -TW  --  --       Gait Training Goal 1 (PT)    Activity/Assistive Device (Gait Training Goal 1, PT)  gait (walking locomotion);assistive device use;decrease fall risk;improve balance and speed;increase endurance/gait distance;walker, rolling  -TW  --  --    [place] Level (Gait Training Goal 1, PT)  conditional independence;independent  -TW  --  --    Distance (Gait Goal 1, PT)  150' x2 or more  -TW  --  --    Time Frame (Gait Training Goal 1, PT)  long term goal (LTG);by discharge  -TW  --  --    Progress/Outcome (Gait Training Goal 1, PT)  goal not met  -TW  --  --       Occupational Therapy Goals    Transfer Goal Selection (OT)  --  transfer, OT goal 1  -RC  --    Bathing Goal Selection (OT)  --  bathing, OT goal 1  -RC  --    Dressing Goal Selection (OT)  --  dressing, OT goal 1  -RC  --    Toileting Goal Selection (OT)  --  toileting, OT goal 1  -RC  --    ROM Goal Selection (OT)  --  ROM, OT goal 1  -RC  --    Strength Goal Selection (OT)  --  strength, OT goal 1  -RC  --       Transfer Goal 1 (OT)    Activity/Assistive Device (Transfer Goal 1, OT)  --  sit-to-stand/stand-to-sit;bed-to-chair/chair-to-bed;toilet  -RC  --     [place] Level/Cues Needed (Transfer Goal 1, OT)  --  supervision required  -RC  --    Time Frame (Transfer Goal 1, OT)  --  long term goal (LTG);by discharge  -RC  --    Progress/Outcome (Transfer Goal 1, OT)  --  goal not met  -RC  --       Bathing Goal 1 (OT)    Activity/Assistive Device (Bathing Goal 1, OT)  --  bathing skills, all  -RC  --    [place] Level/Cues Needed (Bathing Goal 1, OT)  --  supervision required;set-up required  -RC  --    Time Frame (Bathing Goal 1, OT)  --  long term goal (LTG);by discharge  -RC  --    Progress/Outcomes (Bathing Goal 1, OT)  --  goal not met  -RC  --       Dressing Goal 1 (OT)    Activity/Assistive Device (Dressing Goal 1, OT)  --  lower body dressing  -RC  --    [place]/Cues Needed (Dressing Goal 1, OT)  --  supervision required;set-up required  -RC  --    Time Frame (Dressing Goal 1, OT)  --  long term goal (LTG);by discharge  -RC  --    Progress/Outcome (Dressing Goal 1, OT)  --  goal not met  -RC  --       Toileting Goal 1 (OT)    Activity/Device (Toileting Goal 1, OT)  --  toileting skills, all  -RC  --    [place] Level/Cues Needed (Toileting Goal 1, OT)  --  conditional independence  -RC  --    Time Frame (Toileting Goal 1, OT)  --  long term goal (LTG);by discharge  -RC  --    Progress/Outcome (Toileting Goal 1, OT)  --  goal not met  -RC  --       ROM Goal 1 (OT)    ROM Goal 1 (OT)  --  Pt will increase bilat SF to 160 deg to benefit ADL participation.   -RC  --    Time Frame (ROM Goal 1, OT)  --  long term goal (LTG);by discharge  -RC  --    Progress/Outcome (ROM Goal 1, OT)  --  goal not met  -RC  --       Strength Goal 1 (OT)    Strength Goal 1 (OT)  --  Pt will complete 3 sets 10 reps of bilat UE exercises.   -RC  --    Time Frame (Strength Goal 1, OT)  --  long term goal (LTG);by discharge  -RC  --    Progress/Outcome (Strength Goal 1, OT)  --  goal not met  -RC  --       Swallow Goals (SLP)    Oral Nutrition/Hydration Goal Selection (SLP)   --  --  oral nutrition/hydration, SLP goal 1  -EC       Oral Nutrition/Hydration Goal 1 (SLP)    Oral Nutrition/Hydration Goal 1, SLP  --  --  pt to tolerate recommended diet for safe and adequate nutrition/hydration  -EC    Time Frame (Oral Nutrition/Hydration Goal 1, SLP)  --  --  by discharge  -EC    Barriers (Oral Nutrition/Hydration Goal 1, SLP)  --  --  none  -EC    Progress/Outcomes (Oral Nutrition/Hydration Goal 1, SLP)  --  --  (S) goal met  -EC      User Key  (r) = Recorded By, (t) = Taken By, (c) = Cosigned By    Initials Name Provider Type Discipline    EC Pat Egan CCC-SLP Speech and Language Pathologist SLP    Nato Bahena, PTA Physical Therapy Assistant PT    Effie Garcia COTA/L Occupational Therapy Assistant OT          Physical Therapy Education                 Title: PT OT SLP Therapies (In Progress)     Topic: Physical Therapy (Done)     Point: Mobility training (Done)     Description:   Instruct learner(s) on safety and technique for assisting patient out of bed, chair or wheelchair.  Instruct in the proper use of assistive devices, such as walker, crutches, cane or brace.              Patient Friendly Description:   It's important to get you on your feet again, but we need to do so in a way that is safe for you. Falling has serious consequences, and your personal safety is the most important thing of all.        When it's time to get out of bed, one of us or a family member will sit next to you on the bed to give you support.     If your doctor or nurse tells you to use a walker, crutches, a cane, or a brace, be sure you use it every time you get out of bed, even if you think you don't need it.    Learning Progress Summary           Patient Acceptance, E,TB, VU by CM at 9/11/2020 0936    Acceptance, E, NR,VU by DULCE at 9/10/2020 4856    Comment:  PT POC and prognosis; benefits of using RW while recovering; safe hand placement with FWW                   Point: Home  exercise program (Done)     Description:   Instruct learner(s) on appropriate technique for monitoring, assisting and/or progressing patient with therapeutic exercises and activities.              Learning Progress Summary           Patient Acceptance, E,TB, VU by WATSON at 9/11/2020 0943                   Point: Body mechanics (Done)     Description:   Instruct learner(s) on proper positioning and spine alignment for patient and/or caregiver during mobility tasks and/or exercises.              Learning Progress Summary           Patient Acceptance, E,TB, VU by WATSON at 9/11/2020 0943    Acceptance, E, NR,VU by KW at 9/10/2020 1306    Comment:  PT POC and prognosis; benefits of using RW while recovering; safe hand placement with FWW                   Point: Precautions (Done)     Description:   Instruct learner(s) on prescribed precautions during mobility and gait tasks              Learning Progress Summary           Patient Acceptance, E,TB, VU by WATSON at 9/11/2020 0943    Acceptance, E, NR,VU by KW at 9/10/2020 1306    Comment:  PT POC and prognosis; benefits of using RW while recovering; safe hand placement with FWW                               User Key     Initials Effective Dates Name Provider Type Discipline    WATSON 05/28/19 -  Tamara Porras, RN Registered Nurse Nurse    DULCE 08/09/20 -  Georgi Rowe, PT Physical Therapist PT                PT Recommendation and Plan  Anticipated Discharge Disposition (PT): anticipate therapy at next level of care  Outcome Summary/Treatment Plan (PT)  Daily Summary of Progress (PT): progress toward functional goals is good  Plan for Continued Treatment (PT): Cont  Anticipated Discharge Disposition (PT): anticipate therapy at next level of care  Plan of Care Reviewed With: patient  Progress: improving  Outcome Summary: Pt agreeable to therapy. Pt performed sup B LE ther ex and t/f to sitting EOB with CGA. Pt performed seated B LE ther ex and then stood with CGA and amb 18ft with RW  with CGA. Pt with no c/o voiced at completion of tx. Nsg notified of pt up in chair. Pt would cont to benefit from therapy upon DC.  Outcome Measures     Row Name 09/11/20 1320 09/11/20 1027 09/10/20 0941       How much help from another person do you currently need...    Turning from your back to your side while in flat bed without using bedrails?  3  -TW  --  --    Moving from lying on back to sitting on the side of a flat bed without bedrails?  3  -TW  --  --    Moving to and from a bed to a chair (including a wheelchair)?  3  -TW  --  --    Standing up from a chair using your arms (e.g., wheelchair, bedside chair)?  3  -TW  --  --    Climbing 3-5 steps with a railing?  3  -TW  --  --    To walk in hospital room?  3  -TW  --  --    AM-PAC 6 Clicks Score (PT)  18  -TW  --  --       How much help from another is currently needed...    Putting on and taking off regular lower body clothing?  --  2  -RC  2  -AS    Bathing (including washing, rinsing, and drying)  --  2  -RC  2  -AS    Toileting (which includes using toilet bed pan or urinal)  --  2  -RC  2  -AS    Putting on and taking off regular upper body clothing  --  2  -RC  2  -AS    Taking care of personal grooming (such as brushing teeth)  --  3  -RC  3  -AS    Eating meals  --  3  -RC  3  -AS    AM-PAC 6 Clicks Score (OT)  --  14  -RC  14  -AS       Functional Assessment    Outcome Measure Options  --  --  AM-PAC 6 Clicks Daily Activity (OT)  -AS      User Key  (r) = Recorded By, (t) = Taken By, (c) = Cosigned By    Initials Name Provider Type    TW Nato Andres, PTA Physical Therapy Assistant    Effie Garcia COTA/L Occupational Therapy Assistant    AS Altagracia Rowan, OT Occupational Therapist         Time Calculation:   PT Charges     Row Name 09/11/20 1604             Time Calculation    Start Time  1320  -TW      Stop Time  1359  -TW      Time Calculation (min)  39 min  -TW      PT Received On  09/11/20  -TW      PT Goal Re-Cert Due Date   09/23/20  -TW         Time Calculation- PT    Total Timed Code Minutes- PT  39 minute(s)  -TW        User Key  (r) = Recorded By, (t) = Taken By, (c) = Cosigned By    Initials Name Provider Type     Nato Andres PTA Physical Therapy Assistant        Therapy Charges for Today     Code Description Service Date Service Provider Modifiers Qty    59058817218 HC GAIT TRAINING EA 15 MIN 9/11/2020 Nato Andres, YE GP 1    20824490968 HC PT THERAPEUTIC ACT EA 15 MIN 9/11/2020 Nato Andres PTA GP 1    [id number]  PT THER PROC EA 15 MIN 9/11/2020 Nato Andres, YE GP 1          PT G-Codes  Outcome Measure Options: AM-PAC 6 Clicks Daily Activity (OT)  AM-PAC 6 Clicks Score (PT): 18  AM-PAC 6 Clicks Score (OT): 14    Nato Andres PTA  9/11/2020

## 2020-09-11 NOTE — THERAPY DISCHARGE NOTE
Acute Care - Speech Language Pathology   Swallow Treatment Note/Discharge   [facility]     Patient Name: Theresa Esquivel  : 1955  MRN: [id number]  Today's Date: 2020  Onset of Illness/Injury or Date of Surgery: 20     Referring Physician: Dr. Naranjo      Admit Date: 2020  dysphagia therapy: pt tolerates diet with no s/ sof aspiration.  she is missing some teeth and has increased oral prep time.  she is self feeding.  consumed whole pills, sausage, [place] toast, and liquids with no s/s of aspiration.  no cough this date.  d/c skilled ST at this time.  Visit Dx:      ICD-10-CM ICD-9-CM   1. Respiratory failure with hypoxia and hypercapnia, unspecified chronicity (CMS/HCC) J96.91 518.81    J96.92    2. Pneumonia of both lungs due to infectious organism, unspecified part of lung J18.9 483.8   3. Elevated d-dimer R79.89 790.92   4. Pharyngeal dysphagia R13.13 787.23   5. Impaired functional mobility, balance, gait, and endurance Z74.09 V49.89   6. Impaired mobility and ADLs Z74.09 V49.89    Z78.9      Patient Active Problem List   Diagnosis   • Respiratory failure with hypoxia and hypercapnia (CMS/HCC)   • Pneumonia of both lungs due to infectious organism       Therapy Treatment  Rehabilitation Treatment Summary     Row Name 20             Treatment Time/Intention    Discipline  speech language pathologist  -EC      Document Type  discharge treatment  -EC      Subjective Information  no complaints  -EC      Mode of Treatment  individual therapy;speech-language pathology  -EC      Patient/Family Observations  none present  -EC      Care Plan Review  care plan/treatment goals reviewed  -EC      Therapy Frequency (Swallow)  1 day per week  -EC      Patient Effort  excellent  -EC      Recorded by [EC] Pat Egan CCC-SLP 20 0942      Row Name 20             Positioning and Restraints    Pre-Treatment Position  sitting in chair/recliner  -EC      Post Treatment  Position  chair  -EC      In Chair  sitting;call light within reach;encouraged to call for assist;exit alarm on  -EC      Recorded by [EC] Pat Egan CCC-SLP 09/11/20 0936      Row Name 09/11/20 0910             Pain Scale: Numbers Pre/Post-Treatment    Pain Scale: Numbers, Pretreatment  0/10 - no pain  -EC      Pain Scale: Numbers, Post-Treatment  0/10 - no pain  -EC      Recorded by [EC] Pat Egan CCC-SLP 09/11/20 0936        User Key  (r) = Recorded By, (t) = Taken By, (c) = Cosigned By    Initials Name Effective Dates Discipline    EC Pat Egan CCC-SLP 02/11/20 -  SLP        Outcome Summary     SLP GOALS     Row Name 09/11/20 0910 09/10/20 0820 09/09/20 1330       Oral Nutrition/Hydration Goal 1 (SLP)    Oral Nutrition/Hydration Goal 1, SLP  pt to tolerate recommended diet for safe and adequate nutrition/hydration  -EC  pt to tolerate recommended diet for safe and adequate nutrition/hydration  -EC  pt to tolerate recommended diet for safe and adequate nutrition/hydration  -EC    Time Frame (Oral Nutrition/Hydration Goal 1, SLP)  by discharge  -EC  by discharge  -EC  by discharge  -EC    Barriers (Oral Nutrition/Hydration Goal 1, SLP)  none  -EC  throat clear  -EC  throat clear  -EC    Progress/Outcomes (Oral Nutrition/Hydration Goal 1, SLP)  (S) goal met  -EC  goal partially met  -EC  goal ongoing  -EC      User Key  (r) = Recorded By, (t) = Taken By, (c) = Cosigned By    Initials Name Provider Type    EC Pat Egan CCC-SLP Speech and Language Pathologist          EDUCATION  The patient has been educated in the following areas:   Dysphagia (Swallowing Impairment) Modified Diet Instruction.    SLP Recommendation and Plan                                Time Calculation:   Time Calculation- SLP     Row Name 09/11/20 0938             Time Calculation- SLP    SLP Start Time  0910  -EC      SLP Stop Time  0933  -EC      SLP Time Calculation (min)  23 min  -EC      Total Timed  Code Minutes- SLP  23 minute(s)  -EC      SLP Received On  09/11/20  -EC        User Key  (r) = Recorded By, (t) = Taken By, (c) = Cosigned By    Initials Name Provider Type    Pat Redd CCC-SLP Speech and Language Pathologist          Therapy Charges for Today     Code Description Service Date Service Provider Modifiers Qty    18465596202 HC ST TREATMENT SWALLOW 3 9/10/2020 Pat gEan CCC-SLP GN 1    [id number] HC ST TREATMENT SWALLOW 2 9/11/2020 Pat Egan CCC-SLP GN 1                    IHSAN Macario  9/11/2020

## 2020-09-11 NOTE — THERAPY TREATMENT NOTE
Acute Care - Occupational Therapy Treatment Note  [facility]     Patient Name: Theresa Esquivel  : 1955  MRN: [id number]  Today's Date: 2020  Onset of Illness/Injury or Date of Surgery: 20     Referring Physician: Dr. Naranjo    Admit Date: 2020       ICD-10-CM ICD-9-CM   1. Respiratory failure with hypoxia and hypercapnia, unspecified chronicity (CMS/HCC) J96.91 518.81    J96.92    2. Pneumonia of both lungs due to infectious organism, unspecified part of lung J18.9 483.8   3. Elevated d-dimer R79.89 790.92   4. Pharyngeal dysphagia R13.13 787.23   5. Impaired functional mobility, balance, gait, and endurance Z74.09 V49.89   6. Impaired mobility and ADLs Z74.09 V49.89    Z78.9      Patient Active Problem List   Diagnosis   • Respiratory failure with hypoxia and hypercapnia (CMS/HCC)   • Pneumonia of both lungs due to infectious organism     Past Medical History:   Diagnosis Date   • Acute bronchitis, unspecified    • Acute exacerbation of chronic bronchitis (CMS/HCC)    • Acute exacerbation of chronic bronchitis (CMS/HCC)    • Acute frontal sinusitis, unspecified    • Acute laryngitis    • Allergic rhinitis    • Anxiety    • Cancer (CMS/HCC)    • Chronic back pain    • Encounter for therapeutic drug monitoring    • Family history of polyps in the colon    • H/O bone density study 2014   • H/O mammogram 2014   • Hyperlipidemia    • Hypertension    • Malignant neoplasm of female breast (CMS/HCC)      L breast, sp mastectomy      • Osteopenia    • Panic disorder    • Right lower quadrant pain     rule out appendicitis        Past Surgical History:   Procedure Laterality Date   • INJECTION OF MEDICATION  2016    Ramon(3)   • MASTECTOMY      left breast for cancer   • PAP SMEAR  2013    negative for malignancy       Therapy Treatment    Rehabilitation Treatment Summary     Row Name 20 1027 20 0910          Treatment Time/Intention    Discipline   occupational therapy assistant  -RC  speech language pathologist  -EC     Document Type  therapy note (daily note)  -RC2  discharge treatment  -EC     Subjective Information  complains of;pain  -RC2  no complaints  -EC     Mode of Treatment  occupational therapy;individual therapy  -RC2  individual therapy;speech-language pathology  -EC     Patient/Family Observations  no visitor present   -RC2  none present  -EC     Care Plan Review  --  care plan/treatment goals reviewed  -EC     Total Minutes, Occupational Therapy Treatment  20  -RC2  --     Therapy Frequency (OT Eval)  -- 6-7 days per week   -RC2  --     Therapy Frequency (Swallow)  --  1 day per week  -EC     Patient Effort  good  -RC2  excellent  -EC     Existing Precautions/Restrictions  fall  -RC2  --     Recorded by [RC] Effie Segal GILMAN/L 09/11/20 1034  [RC2] Effie Segal GILMAN/L 09/11/20 1051 [EC] Pat Egan CCC-SLP 09/11/20 0936     Row Name 09/11/20 1027             Vital Signs    Pre Systolic BP Rehab  151  -RC      Pre Treatment Diastolic BP  95  -RC      Post Systolic BP Rehab  167  -RC2      Post Treatment Diastolic BP  94  -RC2      Intratreatment Heart Rate (beats/min)  94  -RC2      Pre SpO2 (%)  97  -RC2      O2 Delivery Pre Treatment  room air  -RC2      Post SpO2 (%)  97  -RC2      O2 Delivery Post Treatment  room air  -RC2      Recorded by [RC] Effie Segal GILMAN/L 09/11/20 1034  [RC2] Effie Segal GILMAN/L 09/11/20 1055      Row Name 09/11/20 1027             Cognitive Assessment/Intervention- PT/OT    Orientation Status (Cognition)  oriented x 4  -RC      Recorded by [RC] Effie Segal GILMAN/L 09/11/20 1055      Row Name 09/11/20 1027             Transfer Assessment/Treatment    Transfer Assessment/Treatment  -- chair to chair   -RC      Comment (Transfers)  cga  -RC      Recorded by [RC] Effie Segal GILMAN/L 09/11/20 1055      Row Name 09/11/20 1027             Sit-Stand Transfer    Sit-Stand [place]  (Transfers)  contact guard  -RC      Recorded by [RC] Effie Segal GILMAN/L 09/11/20 1055      Row Name 09/11/20 1027             Stand-Sit Transfer    Stand-Sit [place] (Transfers)  contact guard  -RC      Recorded by [RC] Effie Segal GILMAN/L 09/11/20 1055      Row Name 09/11/20 1027             Upper Extremity Seated Therapeutic Exercise    Performed, Seated Upper Extremity (Therapeutic Exercise)  shoulder flexion/extension;shoulder external/internal rotation;elbow flexion/extension  -RC      Exercise Type, Seated Upper Extremity (Therapeutic Exercise)  AROM (active range of motion)  -RC      Expected Outcomes, Seated Upper Extremity (Therapeutic Exercise)  improve functional tolerance, self-care activity;improve performance, BADLs  -RC      Sets/Reps Detail, Seated Upper Extremity (Therapeutic Exercise)  15  -RC      Recorded by [RC] Effie Segal GILMAN/L 09/11/20 1055      Row Name 09/11/20 1027 09/11/20 0910          Positioning and Restraints    Pre-Treatment Position  sitting in chair/recliner  -RC  sitting in chair/recliner  -EC     Post Treatment Position  wheelchair  -RC  chair  -EC     In Chair  --  sitting;call light within reach;encouraged to call for assist;exit alarm on  -EC     In Wheelchair  with other staff MT taking to new room   -RC  --     Recorded by [RC] Effie Segal COTA/ISAAC 09/11/20 1055 [EC] Pat Egan CCC-SLP 09/11/20 0936     Row Name 09/11/20 1027 09/11/20 0910          Pain Scale: Numbers Pre/Post-Treatment    Pain Scale: Numbers, Pretreatment  6/10  -RC  0/10 - no pain  -EC     Pain Scale: Numbers, Post-Treatment  6/10  -RC2  0/10 - no pain  -EC     Pain Location - Orientation  upper  -RC  --     Pain Location  extremity B  -RC  --     Pain Intervention(s)  Medication (See MAR)  -RC  --     Recorded by [RC] Effie Segal GILMAN/L 09/11/20 1034  [RC2] Effie Segal GILMAN/L 09/11/20 1055 [EC] Pat Egan CCC-SLP 09/11/20 0936     Row Name  09/11/20 1027             Outcome Summary/Treatment Plan (OT)    Daily Summary of Progress (OT)  progress toward functional goals as expected  -RC      Plan for Continued Treatment (OT)  cont poc   -RC      Recorded by [RC] Effie Segal COTA/ISAAC 09/11/20 1055        User Key  (r) = Recorded By, (t) = Taken By, (c) = Cosigned By    Initials Name Effective Dates Discipline     Pat Egan CCC-SLP 02/11/20 -  SLP    RC Effie Segal COTA/L 03/07/18 -  OT           Rehab Goal Summary     Row Name 09/11/20 1027 09/11/20 0910          Occupational Therapy Goals    Transfer Goal Selection (OT)  transfer, OT goal 1  -RC  --     Bathing Goal Selection (OT)  bathing, OT goal 1  -RC  --     Dressing Goal Selection (OT)  dressing, OT goal 1  -RC  --     Toileting Goal Selection (OT)  toileting, OT goal 1  -RC  --     ROM Goal Selection (OT)  ROM, OT goal 1  -RC  --     Strength Goal Selection (OT)  strength, OT goal 1  -RC  --        Transfer Goal 1 (OT)    Activity/Assistive Device (Transfer Goal 1, OT)  sit-to-stand/stand-to-sit;bed-to-chair/chair-to-bed;toilet  -RC  --     [place] Level/Cues Needed (Transfer Goal 1, OT)  supervision required  -RC  --     Time Frame (Transfer Goal 1, OT)  long term goal (LTG);by discharge  -RC  --     Progress/Outcome (Transfer Goal 1, OT)  goal not met  -RC  --        Bathing Goal 1 (OT)    Activity/Assistive Device (Bathing Goal 1, OT)  bathing skills, all  -RC  --     [place] Level/Cues Needed (Bathing Goal 1, OT)  supervision required;set-up required  -RC  --     Time Frame (Bathing Goal 1, OT)  long term goal (LTG);by discharge  -RC  --     Progress/Outcomes (Bathing Goal 1, OT)  goal not met  -RC  --        Dressing Goal 1 (OT)    Activity/Assistive Device (Dressing Goal 1, OT)  lower body dressing  -RC  --     [place]/Cues Needed (Dressing Goal 1, OT)  supervision required;set-up required  -RC  --     Time Frame (Dressing Goal 1, OT)  long term goal  (LTG);by discharge  -RC  --     Progress/Outcome (Dressing Goal 1, OT)  goal not met  -RC  --        Toileting Goal 1 (OT)    Activity/Device (Toileting Goal 1, OT)  toileting skills, all  -RC  --     [place] Level/Cues Needed (Toileting Goal 1, OT)  conditional independence  -RC  --     Time Frame (Toileting Goal 1, OT)  long term goal (LTG);by discharge  -RC  --     Progress/Outcome (Toileting Goal 1, OT)  goal not met  -RC  --        ROM Goal 1 (OT)    ROM Goal 1 (OT)  Pt will increase bilat SF to 160 deg to benefit ADL participation.   -RC  --     Time Frame (ROM Goal 1, OT)  long term goal (LTG);by discharge  -RC  --     Progress/Outcome (ROM Goal 1, OT)  goal not met  -RC  --        Strength Goal 1 (OT)    Strength Goal 1 (OT)  Pt will complete 3 sets 10 reps of bilat UE exercises.   -RC  --     Time Frame (Strength Goal 1, OT)  long term goal (LTG);by discharge  -RC  --     Progress/Outcome (Strength Goal 1, OT)  goal not met  -RC  --        Swallow Goals (SLP)    Oral Nutrition/Hydration Goal Selection (SLP)  --  oral nutrition/hydration, SLP goal 1  -EC        Oral Nutrition/Hydration Goal 1 (SLP)    Oral Nutrition/Hydration Goal 1, SLP  --  pt to tolerate recommended diet for safe and adequate nutrition/hydration  -EC     Time Frame (Oral Nutrition/Hydration Goal 1, SLP)  --  by discharge  -EC     Barriers (Oral Nutrition/Hydration Goal 1, SLP)  --  none  -EC     Progress/Outcomes (Oral Nutrition/Hydration Goal 1, SLP)  --  (S) goal met  -EC       User Key  (r) = Recorded By, (t) = Taken By, (c) = Cosigned By    Initials Name Provider Type Discipline    Pat Redd CCC-SLP Speech and Language Pathologist SLP    Effie Garcia COTA/L Occupational Therapy Assistant OT        Occupational Therapy Education                 Title: PT OT SLP Therapies (In Progress)     Topic: Occupational Therapy (In Progress)     Point: ADL training (Done)     Description:   Instruct learner(s) on proper  safety adaptation and remediation techniques during self care or transfers.   Instruct in proper use of assistive devices.              Learning Progress Summary           Patient Acceptance, E,TB, VU by CM at 9/11/2020 0943    Acceptance, E, VU,NR by AS at 9/10/2020 1341    Comment:  role of OT, OT POC, ADL training, transfer training                   Point: Home exercise program (Done)     Description:   Instruct learner(s) on appropriate technique for monitoring, assisting and/or progressing therapeutic exercises/activities.              Learning Progress Summary           Patient Acceptance, E,TB, VU by CM at 9/11/2020 0943                   Point: Precautions (In Progress)     Description:   Instruct learner(s) on prescribed precautions during self-care and functional transfers.              Learning Progress Summary           Patient Acceptance, E, NR by RC at 9/11/2020 1056    Acceptance, E,TB, VU by CM at 9/11/2020 0943    Acceptance, E, VU,NR by AS at 9/10/2020 1341    Comment:  role of OT, OT POC, ADL training, transfer training                   Point: Body mechanics (In Progress)     Description:   Instruct learner(s) on proper positioning and spine alignment during self-care, functional mobility activities and/or exercises.              Learning Progress Summary           Patient Acceptance, E, NR by RC at 9/11/2020 1056    Acceptance, E,TB, VU by CM at 9/11/2020 0943                               User Key     Initials Effective Dates Name Provider Type Discipline     03/07/18 -  Effie Segal COTA/L Occupational Therapy Assistant OT    CM 05/28/19 -  Tamara Porras, RN Registered Nurse Nurse    AS 07/05/20 -  Altagracia Rowan, OT Occupational Therapist OT                OT Recommendation and Plan  Outcome Summary/Treatment Plan (OT)  Daily Summary of Progress (OT): progress toward functional goals as expected  Plan for Continued Treatment (OT): cont poc   Therapy Frequency (OT Eval): (6-7 days  per week )  Daily Summary of Progress (OT): progress toward functional goals as expected  Plan of Care Review  Plan of Care Reviewed With: patient  Plan of Care Reviewed With: patient  Outcome Summary: participated in ue ex until MT arrived to take to new room. johnna w/o co. transfered w/ cga.  cont poc  Outcome Measures     Row Name 09/11/20 1027 09/10/20 0941          How much help from another is currently needed...    Putting on and taking off regular lower body clothing?  2  -RC  2  -AS     Bathing (including washing, rinsing, and drying)  2  -RC  2  -AS     Toileting (which includes using toilet bed pan or urinal)  2  -RC  2  -AS     Putting on and taking off regular upper body clothing  2  -RC  2  -AS     Taking care of personal grooming (such as brushing teeth)  3  -RC  3  -AS     Eating meals  3  -RC  3  -AS     AM-PAC 6 Clicks Score (OT)  14  -RC  14  -AS        Functional Assessment    Outcome Measure Options  --  AM-PAC 6 Clicks Daily Activity (OT)  -AS       User Key  (r) = Recorded By, (t) = Taken By, (c) = Cosigned By    Initials Name Provider Type    RC Effie Segal GILMAN/L Occupational Therapy Assistant    AS Altagracia Rowan, OT Occupational Therapist           Time Calculation:   Time Calculation- OT     Row Name 09/11/20 1051             Time Calculation- OT    OT Start Time  1027  -RC      OT Stop Time  1047  -RC      OT Time Calculation (min)  20 min  -      Total Timed Code Minutes- OT  20 minute(s)  -      OT Received On  09/11/20  -        User Key  (r) = Recorded By, (t) = Taken By, (c) = Cosigned By    Initials Name Provider Type    Effie Garcia GILMAN/L Occupational Therapy Assistant        Therapy Charges for Today     Code Description Service Date Service Provider Modifiers Qty    [id number] HC OT THER PROC EA 15 MIN 9/11/2020 Effie Segal COTA/ISAAC GO 1               MUKUL Zee/ISAAC  9/11/2020

## 2020-09-11 NOTE — PLAN OF CARE
Problem: Patient Care Overview  Goal: Plan of Care Review  Outcome: Ongoing (interventions implemented as appropriate)  Flowsheets (Taken 9/11/2020 7031)  Progress: improving  Plan of Care Reviewed With: patient  Outcome Summary: dysphagia therapy: pt tolerates diet with no s/ sof aspiration.  she is missing some teeth and has increased oral prep time.  she is self feeding.  consumed whole pills, sausage, [place] toast, and liquids with no s/s of aspiration.  no cough this date.  d/c skilled ST at this time.

## 2020-09-11 NOTE — PROGRESS NOTES
[facility]  INPATIENT PROGRESS NOTE    Length of Stay: 6  Date of Admission: 9/4/2020  Primary Care Physician: Pat Montiel APRN    Subjective   Chief Complaint: Respiratory failure.    HPI: Patient is seen for follow-up.  She is a 64-year-old female with history of nicotine dependence, chronic pain syndrome, chronic bronchitis, hypertension who was admitted for acute hypoxic/hypercapnic respiratory failure requiring endotracheal intubation. Extubated 9/9/2020.     She is feeling better today. Has ambulated. Shortness of breath has improved. Complains she did not sleep well.     Review of Systems   Constitutional: Negative for chills, diaphoresis and fever.   HENT: Negative for sneezing and sore throat.    Eyes: Negative for pain and discharge.   Respiratory: Negative for cough and shortness of breath.    Gastrointestinal: Negative for constipation, diarrhea, nausea and vomiting.   Endocrine: Negative for cold intolerance and heat intolerance.   Genitourinary: Negative for difficulty urinating, dysuria, frequency and urgency.   Musculoskeletal: Negative for arthralgias and myalgias.   Skin: Negative for color change and pallor.   Allergic/Immunologic: Negative for environmental allergies and food allergies.   Neurological: Negative for dizziness, syncope and weakness.   Psychiatric/Behavioral: Negative for confusion and sleep disturbance.       Objective    Temp:  [97.1 °F (36.2 °C)-98.4 °F (36.9 °C)] 97.4 °F (36.3 °C)  Heart Rate:  [77-90] 81  Resp:  [15-18] 16  BP: (115-179)/(77-99) 166/94    Vent Settings:       Physical Exam   Constitutional: She appears well-developed and well-nourished. She is cooperative. No distress.   HENT:   Head: Normocephalic and atraumatic.   Right Ear: External ear normal.   Left Ear: External ear normal.   Nose: Nose normal.   Mouth/Throat: Oropharynx is clear and moist.   Eyes: Conjunctivae and EOM are normal.     Neck: Neck supple. No JVD present. No thyromegaly present.   Cardiovascular: Normal rate, regular rhythm, normal heart sounds and intact distal pulses. Exam reveals no gallop and no friction rub.   No murmur heard.  Pulmonary/Chest: Effort normal. No stridor. No respiratory distress. She has decreased breath sounds. She has no wheezes. She has rhonchi. She has no rales. She exhibits no tenderness.   Abdominal: Soft. Bowel sounds are normal. She exhibits no distension and no mass. There is no tenderness. There is no rebound and no guarding. No hernia.   Musculoskeletal: She exhibits no edema, tenderness or deformity.   Neurological: She has normal reflexes. She exhibits normal muscle tone.   Skin: Skin is warm and dry. No rash noted. She is not diaphoretic. No erythema. No pallor.   Nursing note and vitals reviewed.        Medication Review:    Current Facility-Administered Medications:   •  acetaminophen (TYLENOL) tablet 650 mg, 650 mg, Oral, Q6H PRN, Pat Naranjo MD, 650 mg at 09/09/20 1245  •  amLODIPine (NORVASC) tablet 10 mg, 10 mg, Oral, Q24H, Pat Naranjo MD, 10 mg at 09/11/20 0917  •  benzonatate (TESSALON) capsule 100 mg, 100 mg, Oral, TID PRN, Pat Naranjo MD, 100 mg at 09/10/20 1952  •  budesonide (PULMICORT) nebulizer solution 0.5 mg, 0.5 mg, Nebulization, BID - RT, Feng Valverde MD, 0.5 mg at 09/10/20 1141  •  dextrose (D50W) 25 g/ 50mL Intravenous Solution 25 g, 25 g, Intravenous, Q15 Min PRN, Dagoberto Crum MD  •  dextrose (GLUTOSE) oral gel 15 g, 15 g, Oral, Q15 Min PRN, Dagoberto Crum MD  •  doxycycline (MONODOX) capsule 100 mg, 100 mg, Oral, Q12H, Pat Naranjo MD, 100 mg at 09/11/20 0916  •  enalaprilat (VASOTEC) injection 1.25 mg, 1.25 mg, Intravenous, Q6H PRN, Eulogio Gonzalez MD  •  enoxaparin (LOVENOX) syringe 40 mg, 40 mg, Subcutaneous, Q12H, Dagoberto Crum MD, 40 mg at 09/11/20 0916  •  famotidine (PEPCID) injection 20 mg, 20 mg,  Intravenous, Daily, Eulogio Gonzalez MD, 20 mg at 09/11/20 0917  •  glucagon (human recombinant) (GLUCAGEN DIAGNOSTIC) injection 1 mg, 1 mg, Subcutaneous, Q15 Min PRN, Dagoberto Crum MD  •  hydrOXYzine (ATARAX) tablet 25 mg, 25 mg, Oral, TID PRN, Pat Naranjo MD, 25 mg at 09/10/20 2135  •  insulin aspart (novoLOG) injection 0-7 Units, 0-7 Units, Subcutaneous, TID AC, Dagoberto Crum MD, 2 Units at 09/09/20 1812  •  ipratropium-albuterol (DUO-NEB) nebulizer solution 3 mL, 3 mL, Nebulization, 4x Daily - RT, Feng Valverde MD, 3 mL at 09/11/20 1555  •  melatonin tablet 3 mg, 3 mg, Oral, Nightly, Pat Naranjo MD  •  midazolam (VERSED) injection 2 mg, 2 mg, Intravenous, Q1H PRN, Feng Valverde MD, 2 mg at 09/09/20 0801  •  potassium chloride (KLOR-CON) packet 40 mEq, 40 mEq, Oral, PRN, Preet Chaidez DO, 40 mEq at 09/10/20 1100  •  predniSONE (DELTASONE) tablet 40 mg, 40 mg, Oral, Daily With Breakfast, Pat Naranjo MD, 40 mg at 09/11/20 1611  •  sodium chloride 0.9 % flush 10 mL, 10 mL, Intravenous, PRN, Raghav Lopez MD, 10 mL at 09/05/20 2149  •  sodium chloride 0.9 % flush 10 mL, 10 mL, Intravenous, Q12H, Dagoberto Crum MD, 10 mL at 09/11/20 0918  •  sodium chloride 0.9 % flush 10 mL, 10 mL, Intravenous, PRN, Dagoberto Crum MD    Results Review:  I have reviewed the labs, radiology results, and diagnostic studies.    Laboratory Data:   Results from last 7 days   Lab Units 09/11/20  0540 09/10/20  1547 09/10/20  0329 09/09/20  0536  09/04/20  2316   SODIUM mmol/L 139  --  144 137   < > 138   POTASSIUM mmol/L 4.2 4.4 3.5 3.9   < > 3.6   CHLORIDE mmol/L 101  --  106 105   < > 95*   CO2 mmol/L 25.0  --  24.0 23.0   < > 18.0*   BUN mg/dL 20  --  25* 32*   < > 10   CREATININE mg/dL 0.80  --  0.86 0.71   < > 1.13*   GLUCOSE mg/dL 102*  --  91 112*   < > 234*   CALCIUM mg/dL 9.2  --  8.8 8.7   < > 9.4   BILIRUBIN mg/dL  --   --   --   --   --  0.5      ALK PHOS U/L  --   --   --   --   --  115   ALT (SGPT) U/L  --   --   --   --   --  22   AST (SGOT) U/L  --   --   --   --   --  30   ANION GAP mmol/L 13.0  --  14.0 9.0   < > 25.0*    < > = values in this interval not displayed.     Estimated Creatinine Clearance: 73.5 mL/min (by C-G formula based on SCr of 0.8 mg/dL).  Results from last 7 days   Lab Units 09/11/20  0540 09/10/20  0329 09/09/20  0536   MAGNESIUM mg/dL 2.3 2.2 2.4   PHOSPHORUS mg/dL 3.9 4.2 3.0         Results from last 7 days   Lab Units 09/11/20  0540 09/10/20  0329 09/09/20  0536 09/08/20  0509 09/07/20  0422   WBC 10*3/mm3 10.83* 10.51 9.47 12.13* 10.86*   HEMOGLOBIN g/dL 15.5 14.6 13.3 14.7 13.6   HEMATOCRIT % 46.2 43.5 40.0 43.6 39.6   PLATELETS 10*3/mm3 362 307 296 272 275           Culture Data:   No results found for: BLOODCX  No results found for: URINECX  No results found for: RESPCX  No results found for: WOUNDCX  No results found for: STOOLCX  No components found for: BODYFLD    Radiology Data:   Imaging Results (Last 24 Hours)     ** No results found for the last 24 hours. **          ABG:  Results from last 7 days   Lab Units 09/08/20  0650   PH, ARTERIAL pH units 7.456*   PO2 ART mm Hg 77.3*   PCO2, ARTERIAL mm Hg 35.4   HCO3 ART mmol/L 24.9       I have reviewed the patient's current medications.     Assessment/Plan     Hospital Problem List:  Active Problems:    Respiratory failure with hypoxia and hypercapnia (CMS/HCC)    Pneumonia of both lungs due to infectious organism    Acute respiratory failure with hypoxia and hypercapnia (secondary to bilateral pneumonia to rule out COVID-19 viral infection): Pulm is following. Extubated 9/9/2020. Continue antibiotics. Tessalon pearls for cough.  She is wheezing on exam so will add prednisone.  PT/OT    Sepsis secondary to bilateral pneumonia (to rule out COVID-19 viral infection):  Blood cultures have shown no growth.  Lactic acidosis has resolved.     Azotemia: Resolved.    Anxiety:  start Atarax, needs outpatient follow up. Melatonin to sleep.     Elevated d-dimer is likely due to acute illness and CT pulmonary angiogram is negative for pulmonary embolism.    Hyperglycemia is likely due to acute illness as patient is not a known diabetic.  Hemoglobin A1c is 5.90.  Continue Accu-Cheks and sliding scale insulin.    Hypertension: Stable. Stop Lisinopril and start Norvasc. Monitor BP     Nicotine dependence: Continue nicotine patch.      Nutrition: advance diet as tolerated     Continue GI and DVT prophylaxis.        Discharge Planning: In progress. Transfer to the floor today.    Pat Naranjo MD   09/11/20   16:43

## 2020-09-11 NOTE — PLAN OF CARE
Problem: Patient Care Overview  Goal: Plan of Care Review  Outcome: Ongoing (interventions implemented as appropriate)  Flowsheets (Taken 9/11/2020 1054)  Progress: improving  Plan of Care Reviewed With: patient  Outcome Summary: participated in ue ex until MT arrived to take to new room. johnna w/o co. transfered w/ cga.  cont poc

## 2020-09-12 VITALS
OXYGEN SATURATION: 94 % | BODY MASS INDEX: 28.06 KG/M2 | RESPIRATION RATE: 16 BRPM | HEART RATE: 89 BPM | DIASTOLIC BLOOD PRESSURE: 93 MMHG | SYSTOLIC BLOOD PRESSURE: 156 MMHG | TEMPERATURE: 97.3 F | WEIGHT: 168.4 LBS | HEIGHT: 65 IN

## 2020-09-12 LAB
ANION GAP SERPL CALCULATED.3IONS-SCNC: 12 MMOL/L (ref 5–15)
BASOPHILS # BLD AUTO: 0.03 10*3/MM3 (ref 0–0.2)
BASOPHILS NFR BLD AUTO: 0.2 % (ref 0–1.5)
BUN SERPL-MCNC: 23 MG/DL (ref 8–23)
BUN/CREAT SERPL: 27.1 (ref 7–25)
CALCIUM SPEC-SCNC: 9.7 MG/DL (ref 8.6–10.5)
CHLORIDE SERPL-SCNC: 99 MMOL/L (ref 98–107)
CO2 SERPL-SCNC: 26 MMOL/L (ref 22–29)
CREAT SERPL-MCNC: 0.85 MG/DL (ref 0.57–1)
DEPRECATED RDW RBC AUTO: 42.3 FL (ref 37–54)
EOSINOPHIL # BLD AUTO: 0.03 10*3/MM3 (ref 0–0.4)
EOSINOPHIL NFR BLD AUTO: 0.2 % (ref 0.3–6.2)
ERYTHROCYTE [DISTWIDTH] IN BLOOD BY AUTOMATED COUNT: 12.9 % (ref 12.3–15.4)
GFR SERPL CREATININE-BSD FRML MDRD: 67 ML/MIN/1.73
GLUCOSE BLDC GLUCOMTR-MCNC: 103 MG/DL (ref 70–130)
GLUCOSE BLDC GLUCOMTR-MCNC: 132 MG/DL (ref 70–130)
GLUCOSE SERPL-MCNC: 114 MG/DL (ref 65–99)
HCT VFR BLD AUTO: 47.7 % (ref 34–46.6)
HGB BLD-MCNC: 15.6 G/DL (ref 12–15.9)
IMM GRANULOCYTES # BLD AUTO: 0.17 10*3/MM3 (ref 0–0.05)
IMM GRANULOCYTES NFR BLD AUTO: 1.4 % (ref 0–0.5)
LYMPHOCYTES # BLD AUTO: 2.74 10*3/MM3 (ref 0.7–3.1)
LYMPHOCYTES NFR BLD AUTO: 22.5 % (ref 19.6–45.3)
MAGNESIUM SERPL-MCNC: 2.4 MG/DL (ref 1.6–2.4)
MCH RBC QN AUTO: 29.7 PG (ref 26.6–33)
MCHC RBC AUTO-ENTMCNC: 32.7 G/DL (ref 31.5–35.7)
MCV RBC AUTO: 90.9 FL (ref 79–97)
MONOCYTES # BLD AUTO: 0.69 10*3/MM3 (ref 0.1–0.9)
MONOCYTES NFR BLD AUTO: 5.7 % (ref 5–12)
NEUTROPHILS NFR BLD AUTO: 70 % (ref 42.7–76)
NEUTROPHILS NFR BLD AUTO: 8.5 10*3/MM3 (ref 1.7–7)
NRBC BLD AUTO-RTO: 0 /100 WBC (ref 0–0.2)
PHOSPHATE SERPL-MCNC: 4.1 MG/DL (ref 2.5–4.5)
PLATELET # BLD AUTO: 440 10*3/MM3 (ref 140–450)
PMV BLD AUTO: 10.2 FL (ref 6–12)
POTASSIUM SERPL-SCNC: 4 MMOL/L (ref 3.5–5.2)
RBC # BLD AUTO: 5.25 10*6/MM3 (ref 3.77–5.28)
SODIUM SERPL-SCNC: 137 MMOL/L (ref 136–145)
WBC # BLD AUTO: 12.16 10*3/MM3 (ref 3.4–10.8)

## 2020-09-12 PROCEDURE — 97116 GAIT TRAINING THERAPY: CPT

## 2020-09-12 PROCEDURE — 63710000001 PREDNISONE PER 1 MG: Performed by: STUDENT IN AN ORGANIZED HEALTH CARE EDUCATION/TRAINING PROGRAM

## 2020-09-12 PROCEDURE — 83735 ASSAY OF MAGNESIUM: CPT | Performed by: INTERNAL MEDICINE

## 2020-09-12 PROCEDURE — 94799 UNLISTED PULMONARY SVC/PX: CPT

## 2020-09-12 PROCEDURE — 97110 THERAPEUTIC EXERCISES: CPT

## 2020-09-12 PROCEDURE — 25010000002 ENOXAPARIN PER 10 MG: Performed by: INTERNAL MEDICINE

## 2020-09-12 PROCEDURE — 97530 THERAPEUTIC ACTIVITIES: CPT

## 2020-09-12 PROCEDURE — 97535 SELF CARE MNGMENT TRAINING: CPT

## 2020-09-12 PROCEDURE — 93010 ELECTROCARDIOGRAM REPORT: CPT | Performed by: INTERNAL MEDICINE

## 2020-09-12 PROCEDURE — 93005 ELECTROCARDIOGRAM TRACING: CPT | Performed by: INTERNAL MEDICINE

## 2020-09-12 PROCEDURE — 90686 IIV4 VACC NO PRSV 0.5 ML IM: CPT | Performed by: NURSE PRACTITIONER

## 2020-09-12 PROCEDURE — G0008 ADMIN INFLUENZA VIRUS VAC: HCPCS | Performed by: NURSE PRACTITIONER

## 2020-09-12 PROCEDURE — 85025 COMPLETE CBC W/AUTO DIFF WBC: CPT | Performed by: INTERNAL MEDICINE

## 2020-09-12 PROCEDURE — 82962 GLUCOSE BLOOD TEST: CPT

## 2020-09-12 PROCEDURE — 25010000002 INFLUENZA VAC SPLIT QUAD 0.5 ML SUSPENSION PREFILLED SYRINGE: Performed by: NURSE PRACTITIONER

## 2020-09-12 PROCEDURE — 80048 BASIC METABOLIC PNL TOTAL CA: CPT | Performed by: INTERNAL MEDICINE

## 2020-09-12 PROCEDURE — 84100 ASSAY OF PHOSPHORUS: CPT | Performed by: INTERNAL MEDICINE

## 2020-09-12 RX ORDER — DOXYCYCLINE 100 MG/1
100 CAPSULE ORAL EVERY 12 HOURS SCHEDULED
Qty: 4 CAPSULE | Refills: 0 | Status: SHIPPED | OUTPATIENT
Start: 2020-09-12 | End: 2020-09-14

## 2020-09-12 RX ORDER — AMLODIPINE BESYLATE 10 MG/1
10 TABLET ORAL
Qty: 30 TABLET | Refills: 0 | Status: SHIPPED | OUTPATIENT
Start: 2020-09-13

## 2020-09-12 RX ORDER — BENZONATATE 100 MG/1
100 CAPSULE ORAL 3 TIMES DAILY PRN
Qty: 30 CAPSULE | Refills: 0 | Status: SHIPPED | OUTPATIENT
Start: 2020-09-12 | End: 2021-08-17

## 2020-09-12 RX ORDER — PREDNISONE 20 MG/1
40 TABLET ORAL
Qty: 5 TABLET | Refills: 0 | Status: SHIPPED | OUTPATIENT
Start: 2020-09-13 | End: 2020-09-18

## 2020-09-12 RX ADMIN — ACETAMINOPHEN 650 MG: 325 TABLET, FILM COATED ORAL at 00:40

## 2020-09-12 RX ADMIN — PREDNISONE 40 MG: 20 TABLET ORAL at 08:00

## 2020-09-12 RX ADMIN — ENOXAPARIN SODIUM 40 MG: 40 INJECTION SUBCUTANEOUS at 08:00

## 2020-09-12 RX ADMIN — BUDESONIDE 0.5 MG: 0.5 INHALANT RESPIRATORY (INHALATION) at 07:00

## 2020-09-12 RX ADMIN — INFLUENZA VIRUS VACCINE 0.5 ML: 15; 15; 15; 15 SUSPENSION INTRAMUSCULAR at 11:41

## 2020-09-12 RX ADMIN — AMLODIPINE BESYLATE 10 MG: 10 TABLET ORAL at 08:00

## 2020-09-12 RX ADMIN — DOXYCYCLINE 100 MG: 100 CAPSULE ORAL at 08:00

## 2020-09-12 RX ADMIN — IPRATROPIUM BROMIDE AND ALBUTEROL SULFATE 3 ML: 2.5; .5 SOLUTION RESPIRATORY (INHALATION) at 07:00

## 2020-09-12 RX ADMIN — FAMOTIDINE 20 MG: 10 INJECTION INTRAVENOUS at 08:00

## 2020-09-12 NOTE — DISCHARGE PLACEMENT REQUEST
"Theresa Esquivel (64 y.o. Female)     Date of Birth Social Security Number Address Home Phone MRN    1955  [address] [id number] [phone] [id number]    [facility] Mar[facility] Status          [employer]        Admission Date Admission Type Admitting Provider Attending Provider Department, Room/Bed    9/4/20 Emergency Dagoberto Crum MD Gerlach, Elizabeth T, MD [address], 304/1    Discharge Date Discharge Disposition Discharge Destination         Home-Health Care [facility]              Attending Provider: Pat Naranjo MD    Allergies: Augmentin [Amoxicillin-pot Clavulanate], Ciprofloxacin, Codeine, Eggs Or Egg-derived Products, Paxil [Paroxetine Hcl]    Isolation: None   Infection: None   Code Status: CPR    Ht: 165.1 cm (65\")   Wt: 76.4 kg (168 lb 6.4 oz)    Admission Cmt: None   Principal Problem: None                Active Insurance as of 9/4/2020     Primary Coverage     Payor Plan Insurance Group Employer/Plan Group    [employer] [place] [place] [employer] [place] [employer] [place] [place]      Payor Plan Address Payor Plan Phone Number Payor Plan Fax Number Effective Dates    [address] [address]   1/1/2014 - None Entered    [place] [address]       Subscriber Name Subscriber Birth Date Member ID       JERRODTHERESA ERI 1955 [id number]                 Emergency Contacts      (Rel.) Home Phone Work Phone Mobile Phone    Connie Collins (Daughter) [phone] -- --    DianeDiony ordaz (Grandchild) -- -- [phone]    CaraEulogio tolentino (Son) -- -- [phone]            Insurance Information                [employer] Phone:     Subscriber: Theresa Esquivel Subscriber#: [id number]    Group#:  Precert#:         [address] [address]  Phone:  [phone]  Fax:   Date: Sep 12, 2020      Ambulatory Referral to Home Health     Patient:  Theresa Esquivel " MRN:  [id number]   [address] [facility] [place] [address] :  1955  SSN:    Phone: [phone] Sex:  F      INSURANCE PAYOR PLAN GROUP # SUBSCRIBER ID   Primary:    [employer] [place] [id number]   [id number]      Referring Provider Information:  AUTUMN ALONSO Phone: [phone] Fax:       Referral Information:   # Visits:  1 Referral Type: Home Health [42]   Urgency:  Routine Referral Reason: Specialty Services Required   Start Date: Sep 12, 2020 End Date:  To be determined by Insurer   Diagnosis: Respiratory failure with hypoxia and hypercapnia, unspecified chronicity (CMS/HCC) (J96.91,J96.92 [ICD-10-CM] 518.81 [ICD-9-CM])      Refer to Dept:   Refer to Provider:   Refer to Facility:       Face to Face Visit Date: 2020  Follow-up provider for Plan of Care? I treated the patient in an acute care facility and will not continue treatment after discharge.  Follow-up provider: BENITEZ ZARCO [[id number]]  Reason/Clinical Findings: deconditioning r/t pneumonia, resp failure  Describe mobility limitations that make leaving home difficult: deconditioning r/t pneumonia, resp failure  Nursing/Therapeutic Services Requested: Skilled Nursing  Nursing/Therapeutic Services Requested: Physical Therapy  Nursing/Therapeutic Services Requested: Occupational Therapy  Skilled nursing orders: Cardiopulmonary assessments  PT orders: Strengthening  Occupational orders: Strengthening  Frequency: 1 Week 1     This document serves as a request of services and does not constitute Insurance authorization or approval of services.  To determine eligibility, please contact the members Insurance carrier to verify and review coverage.     If you have medical questions regarding this request for services. Please contact [address] at [phone] during normal business hours.       Authorizing Provider:Autumn Alonso APRN  Authorizing Provider's NPI: [id number]  Order Entered By:  Goldie Alonso APRN 9/12/2020 10:44 AM     Electronically signed by: Goldie Alonso APRN 9/12/2020 10:44 AM

## 2020-09-12 NOTE — DISCHARGE SUMMARY
[facility] Medicine Services  DISCHARGE SUMMARY       Date of Admission: 9/4/2020  Date of Discharge:  9/12/2020  Primary Care Physician: Pat Montiel APRN    Presenting Problem/History of Present Illness:  Elevated d-dimer [R79.89]  Pneumonia of both lungs due to infectious organism, unspecified part of lung [J18.9]  Respiratory failure with hypoxia and hypercapnia, unspecified chronicity (CMS/HCC) [J96.91, J96.92]       Final Discharge Diagnoses:  Active Hospital Problems    Diagnosis   • Pneumonia of both lungs due to infectious organism   • Respiratory failure with hypoxia and hypercapnia (CMS/HCC)       Consults:   Consults     Date and Time Order Name Status Description    9/6/2020 0858 Inpatient Pulmonology Consult Completed           Procedures Performed:                 Pertinent Test Results:   Lab Results (last 24 hours)     Procedure Component Value Units Date/Time    Basic Metabolic Panel [[id number]]  (Abnormal) Collected: 09/12/20 0517    Specimen: Blood Updated: 09/12/20 0654     Glucose 114 mg/dL      BUN 23 mg/dL      Creatinine 0.85 mg/dL      Sodium 137 mmol/L      Potassium 4.0 mmol/L      Comment: Slight hemolysis detected by analyzer. Results may be affected.        Chloride 99 mmol/L      CO2 26.0 mmol/L      Calcium 9.7 mg/dL      eGFR Non African Amer 67 mL/min/1.73      BUN/Creatinine Ratio 27.1     Anion Gap 12.0 mmol/L     Narrative:      GFR Normal >60  Chronic Kidney Disease <60  Kidney Failure <15      Magnesium [[id number]]  (Normal) Collected: 09/12/20 0517    Specimen: Blood Updated: 09/12/20 0654     Magnesium 2.4 mg/dL     Phosphorus [[id number]]  (Normal) Collected: 09/12/20 0517    Specimen: Blood Updated: 09/12/20 0654     Phosphorus 4.1 mg/dL     POC Glucose Once [[id number]]  (Normal) Collected: 09/12/20 0615    Specimen: Blood Updated: 09/12/20 0635     Glucose 103 mg/dL     CBC & Differential [[id number]]  (Abnormal)  Collected: 09/12/20 0517    Specimen: Blood Updated: 09/12/20 0630    Narrative:      The following orders were created for panel order CBC & Differential.  Procedure                               Abnormality         Status                     ---------                               -----------         ------                     CBC Auto Differential[[id number]]        Abnormal            Final result                 Please view results for these tests on the individual orders.    CBC Auto Differential [[id number]]  (Abnormal) Collected: 09/12/20 0517    Specimen: Blood Updated: 09/12/20 0630     WBC 12.16 10*3/mm3      RBC 5.25 10*6/mm3      Hemoglobin 15.6 g/dL      Hematocrit 47.7 %      MCV 90.9 fL      MCH 29.7 pg      MCHC 32.7 g/dL      RDW 12.9 %      RDW-SD 42.3 fl      MPV 10.2 fL      Platelets 440 10*3/mm3      Neutrophil % 70.0 %      Lymphocyte % 22.5 %      Monocyte % 5.7 %      Eosinophil % 0.2 %      Basophil % 0.2 %      Immature Grans % 1.4 %      Neutrophils, Absolute 8.50 10*3/mm3      Lymphocytes, Absolute 2.74 10*3/mm3      Monocytes, Absolute 0.69 10*3/mm3      Eosinophils, Absolute 0.03 10*3/mm3      Basophils, Absolute 0.03 10*3/mm3      Immature Grans, Absolute 0.17 10*3/mm3      nRBC 0.0 /100 WBC     POC Glucose Once [[id number]]  (Abnormal) Collected: 09/11/20 2000    Specimen: Blood Updated: 09/11/20 2023     Glucose 157 mg/dL     POC Glucose Once [[id number]]  (Normal) Collected: 09/11/20 1638    Specimen: Blood Updated: 09/11/20 1758     Glucose 108 mg/dL     POC Glucose Once [[id number]]  (Normal) Collected: 09/11/20 1105    Specimen: Blood Updated: 09/11/20 1757     Glucose 108 mg/dL         Imaging Results (All)     Procedure Component Value Units Date/Time    CT Head Without Contrast [[id number]] Collected: 09/08/20 1746     Updated: 09/08/20 1805    Narrative:        CT Head Without Contrast    History: Decreased alertness. Respiratory failure. Hypoxia.  Pneumonia.    Axial scans of  the brain were obtained without intravenous  contrast.  Coronal and sagital reconstructions were preformed.    This exam was performed according to our departmental  dose-optimization program, which includes automated exposure  control, adjustment of the mA and/or kV according to patient size  and/or use of iterative reconstruction technique.    DLP: 961.80    Comparison: None    Findings:  Bone windows are unremarkable.  Fluid opacifies the majority of the right maxillary sinus.  Nasogastric tube in place.  Endotracheal tube in place.    Minimal cerebral atrophy.  Minimal small vessel disease.  No hemorrhage.  No mass.  No abnormal areas of increased attenuation.  No midline shift.  No abnormal extra-axial fluid collections.      Impression:      CONCLUSION:  Acute right maxillary sinusitis.  Nasogastric tube in place.  Endotracheal tube in place.  Minimal cerebral atrophy.  Minimal small vessel disease.    [id number]    Electronically signed by:  Stanam Johnson MD  9/8/2020 6:04 PM CDT  Workstation: [id number]    XR Chest 1 View [[id number]] Collected: 09/07/20 1007     Updated: 09/07/20 1051    Narrative:        PROCEDURE: Single chest view portable erect    REASON FOR EXAM:post intubation, J96.91 Respiratory failure,  unspecified with hypoxia J96.92 Respiratory failure, unspecified  with hypercapnia J18.9 Pneumonia, unspecified organism R79.89  Other specified abnormal findings of blood chemistry    FINDINGS: Comparison exam dated September 7, 2020. ET tube with  tip 4 cm  above melissa. Feeding tube with tip within the region  of the distal duodenum proximal jejunum. Cardiac and pulmonary  vasculature are normal. Stable calcified left perihilar lymph  nodes consistent with old granulomatous disease. Left lung base  small linear patchy opacity. Lungs are otherwise clear. Pleural  spaces are normal. No acute osseous abnormality.      Impression:      1.  Tubes as described above.  2.  Evidence of old granulomatous  disease.  3.  Left lung base small linear opacities suspicious for discoid  atelectasis versus early pneumonia.    Electronically signed by:  Tyler Childers MD  9/7/2020 10:50 AM CDT  Workstation: [id number]    XR Chest 1 View [[id number]] Collected: 09/07/20 0530     Updated: 09/07/20 0728    Narrative:        PROCEDURE: Single chest view portable    REASON FOR EXAM:Follow-up respiratory failure., J96.91  Respiratory failure, unspecified with hypoxia J96.92 Respiratory  failure, unspecified with hypercapnia J18.9 Pneumonia,  unspecified organism R79.89 Other specified abnormal findings of  blood chemistry    FINDINGS: Comparison exam dated September 4, 2020. ET tube with  tip 3 cm above melissa. Feeding tube with tip below level  diaphragm. Cardiac size appears within normal limits. Mild  pulmonary vasculature redistribution. Marked improvement in  bilateral perihilar and bibasilar interstitial opacities with  residual minimal interstitial opacities in the lung bases. Lungs  are otherwise clear. Pleural spaces are normal. No acute osseous  abnormality.      Impression:      1.  Marked improvement in bilateral perihilar and bibasilar  interstitial opacities with residual minimal interstitial  opacities in the lung bases. This suggests marked improvement in  pulmonary edema and/or pneumonia.  2.  Tubes as described above.    Electronically signed by:  Tyler Childers MD  9/7/2020 7:27 AM CDT  Workstation: [id number]    XR Abdomen KUB [[id number]] Collected: 09/06/20 1541     Updated: 09/06/20 1614    Narrative:      Exam:  KUB    History:  Cortrak placement    Supine film of the abdomen was obtained portably at 3:41 PM.    Comparison: December 23, 2013    Feeding tube in place with tip in the left upper quadrant,  presumably in the proximal jejunum.  Razo catheter.  No mechanical bowel obstruction.  No organomegaly.  Pelvic phleboliths.  Atherosclerotic calcification abdominal aorta..  No acute osseous abnormality.       Impression:      Conclusion:  Feeding tube in place with tip in the left upper quadrant,  presumably in the proximal jejunum.      [id number]    Electronically signed by:  Satnam Johnson MD  9/6/2020 4:13 PM CDT  Workstation: [id number]    CT Angiogram Chest [[id number]] Collected: 09/05/20 0128     Updated: 09/05/20 0200    Narrative:        CT angiogram chest with contrast on 9/5/2020     CLINICAL INDICATION: Shortness of breath, elevated d-dimer    TECHNIQUE: Multiple axial images are obtained throughout the  chest following the administration of IV contrast.  Computer  generated 3D reconstructions/MIPS were performed. This exam was  performed according to our departmental dose-optimization  program, which includes automated exposure control, adjustment of  the mA and/or kV according to patient size and/or use of  iterative reconstruction technique.  Total DLP is 399 mGy*cm.    COMPARISON: None     FINDINGS: ET tube tip is in the midthoracic trachea. NG tube  extends into the upper stomach in good position. 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 There is fatty infiltration of the liver. Limited  visualized upper abdomen is otherwise unremarkable. There is no  pleural or pericardial effusion. There are no filling defects  within the pulmonary arteries to suggest a pulmonary embolus.  There is evidence of calcified granulomatous disease in the  chest. There is left greater than right lower lobe consolidation  consistent with pneumonia, consider aspiration. There are other  patchy bilateral groundglass and airspace opacities likely also  infectious in nature. Differential diagnosis would include viral  infections. No bony abnormality is noted.      Impression:      1. No evidence of pulmonary embolus.  2. Bilateral lower lobe areas of consolidation greatest in the  left lower lobe consistent with pneumonia with other bilateral  opacities also likely infectious in nature.  Imaging features can  be seen with a viral or COVID 19 pneumonia, though are  nonspecific and can occur with a variety of infectious and  noninfectious processes. [PneInd]    Electronically signed by:  Devonte Alvarez  9/5/2020 1:59 AM CDT  Workstation: [id number]    XR Chest 1 View [[id number]] Collected: 09/04/20 2310     Updated: 09/04/20 2328    Narrative:      PORTABLE CHEST X-RAY    CLINICAL HISTORY: CHF/COPD Protocol    COMPARISON: 6/20/2017.    FINDINGS: Portable AP view of the chest was obtained with  overlying monitor leads in place. ET tube terminates at the level  of the mid thoracic trachea. Nasogastric tube is coiled beneath  the left hemidiaphragm terminating in the region of the proximal  stomach. Lungs are poorly aerated. There are left greater than  right groundglass and interstitial opacities favored to be  related to edema rather than pneumonia. There are calcified  residua of granulomatous disease present. No significant pleural  fluid. Normal heart size.      Impression:      Life support lines as above, lungs are under aerated  with left greater than right pulmonary opacities as discussed      Electronically signed by:  Alfonso Medrano MD  9/4/2020 11:27 PM  CDT Workstation: [id number]            Chief Complaint on Day of Discharge: none    Hospital Course:  64-year-old  female with past medical history of tobacco abuse, previous breast cancer, chronic pain, hypertension, hyperlipidemia who presented on 9/4/2020 with hypoxic and hypercapnic respiratory failure requiring intubation.  Patient required intubation and mechanical ventilation through 9/9/2020 and was followed by pulmonology team for ventilator assistance.  Bilateral lower lobe consolidations were noted on admission  And patient was tested for COVID-19.  COVID-19 test was negative.  Patient was treated with IV antibiotic therapy, ventilator support, and inhalers.  Blood and respiratory culture showed no growth of organism.  CTA  "of chest was performed and ruled out pulmonary embolism.  Post extubation on 9/9 patient had no issues.  She was able to be transferred to medical unit and is currently participating well with PT, OT and tolerating food intake without issue.  She remains deconditioned due to her illness and has been recommended home health PT, OT, and respiratory assessments at discharge which has been arranged by case management.  She is weaned to room air and will be discharged home in stable condition with instructions for one week PCP follow up.     Condition on Discharge:  stable    Physical Exam on Discharge:  /93 (BP Location: Right leg, Patient Position: Lying)   Pulse 89   Temp 97.3 °F (36.3 °C) (Oral)   Resp 16   Ht 165.1 cm (65\")   Wt 76.4 kg (168 lb 6.4 oz)   SpO2 94%   BMI 28.02 kg/m²   Physical Exam  Vitals signs and nursing note reviewed.   Constitutional:       General: She is not in acute distress.     Appearance: She is well-developed and well-nourished. She is not diaphoretic.   HENT:      Head: Normocephalic and atraumatic.      Right Ear: External ear normal.      Left Ear: External ear normal.      Nose: Nose normal.   Eyes:      General: No scleral icterus.        Right eye: No discharge.         Left eye: No discharge.      Conjunctiva/sclera: Conjunctivae normal.   Neck:      Musculoskeletal: Normal range of motion and neck supple.      Vascular: No JVD.   Cardiovascular:      Rate and Rhythm: Normal rate and regular rhythm.      Pulses: Intact distal pulses. Pulses are strong.      Heart sounds: Normal heart sounds. No murmur. No friction rub. No gallop.    Pulmonary:      Effort: Pulmonary effort is normal. No respiratory distress.      Breath sounds: Normal breath sounds. No stridor. No wheezing or rales.   Abdominal:      General: Bowel sounds are normal. There is no distension.      Palpations: Abdomen is soft.      Tenderness: There is no abdominal tenderness.   Musculoskeletal: Normal " range of motion.         General: No edema.   Skin:     General: Skin is warm and dry.   Neurological:      Mental Status: She is alert and oriented to person, place, and time.   Psychiatric:         Mood and Affect: Mood and affect normal.         Behavior: Behavior normal.           [place] [facility] [place] Medications:     Discharge Medications      New Medications      Instructions Start Date   amLODIPine 10 MG tablet  Commonly known as: NORVASC   10 mg, Oral, Every 24 Hours Scheduled   Start Date: September 13, 2020     benzonatate 100 MG capsule  Commonly known as: TESSALON   100 mg, Oral, 3 Times Daily PRN      doxycycline 100 MG capsule  Commonly known as: MONODOX   100 mg, Oral, Every 12 Hours Scheduled      predniSONE 20 MG tablet  Commonly known as: DELTASONE   40 mg, Oral, Daily With Breakfast   Start Date: September 13, 2020        Continue These Medications      Instructions Start Date   HYDROcodone-acetaminophen 5-325 MG per tablet  Commonly known as: NORCO   1 tablet, Oral, Every 8 Hours PRN         Stop These Medications    lisinopril 10 MG tablet  Commonly known as: PRINIVIL,ZESTRIL            Discharge Diet:   Diet Instructions     Diet: Cardiac; Thin      Discharge Diet: Cardiac    Fluid Consistency: Thin          Activity at Discharge:   Activity Instructions     Activity as Tolerated            Discharge Care Plan/Instructions: Follow up with PCP within one week.      Follow-up Appointments:   Additional Instructions for the Follow-ups that You Need to Schedule     Ambulatory Referral to Home Health   As directed      Face to Face Visit Date: 9/12/2020    Follow-up provider for Plan of Care?: I treated the patient in an acute care facility and will not continue treatment after discharge.    Follow-up provider: BENITEZ ZARCO [[id number]]    Reason/Clinical Findings: deconditioning r/t pneumonia, resp failure    Describe mobility limitations that make  leaving home difficult: deconditioning r/t pneumonia, resp failure    Nursing/Therapeutic Services Requested: Skilled Nursing Physical Therapy Occupational Therapy    Skilled nursing orders: Cardiopulmonary assessments    PT orders: Strengthening    Occupational orders: Strengthening    Frequency: 1 Week 1         Discharge Follow-up with PCP   As directed       Currently Documented PCP:    Pat Montiel APRN    PCP Phone Number:    [phone]     Follow Up Details: one week            Contact information for follow-up providers     Pat Montiel APRN Follow up.    Specialty: Family Medicine  Why: office will call you with appointment date and time  Contact information:  [address] [facility] [address] [phone]             Pat Montiel APRN .    Specialty: Family Medicine  Why: one week  Contact information:  [address] [facility] [address]  [phone]                   Contact information for after-discharge care     Home Medical Care     [facility] .    Service: Home Health Services  Contact information:  [address]   [facility] [address] [phone]                             Test Results Pending at Discharge:           This document has been electronically signed by BRITTANY Robert on September 12, 2020 17:35 CDT        Time: 30 minutes spent on assessment, discussion, management, and discharge planning on this patient.

## 2020-09-12 NOTE — PLAN OF CARE
Problem: Patient Care Overview  Goal: Plan of Care Review  Outcome: Ongoing (interventions implemented as appropriate)  Flowsheets  Taken 9/11/2020 1320 by Nato Andres PTA  Progress: improving  Taken 9/11/2020 2000 by Audi James, RN  Plan of Care Reviewed With: patient     Problem: Patient Care Overview  Goal: Individualization and Mutuality  Outcome: Ongoing (interventions implemented as appropriate)     Problem: Patient Care Overview  Goal: Discharge Needs Assessment  Outcome: Ongoing (interventions implemented as appropriate)  Flowsheets (Taken 9/10/2020 1341 by Martha Bingham, RN)  Equipment Needed After Discharge: -- (uncertain at this time)  Discharge Coordination/Progress: pt lives alone,  has transportation and rx coverage.   no needs anticipated.  will be based on therapy recommendations.    Equipment Currently Used at Home: none  Anticipated Changes Related to Illness: none  Concerns Comments: pending therapy recommendations.   Transportation Anticipated: car, drives self;family or friend will provide  Transportation Concerns: car, none  Concerns to be Addressed: denies needs/concerns at this time  Patient/Family Anticipated Services at Transition: none  Patient/Family Anticipates Transition to: home     Problem: Patient Care Overview  Goal: Interprofessional Rounds/Family Conf  Outcome: Ongoing (interventions implemented as appropriate)     Problem: Skin Injury Risk (Adult)  Goal: Identify Related Risk Factors and Signs and Symptoms  Outcome: Ongoing (interventions implemented as appropriate)  Flowsheets (Taken 9/10/2020 1806 by Joyce Eagle, RN)  Related Risk Factors (Skin Injury Risk): medical devices;critical care admission     Problem: Skin Injury Risk (Adult)  Goal: Skin Health and Integrity  Outcome: Ongoing (interventions implemented as appropriate)  Flowsheets (Taken 9/10/2020 1806 by Joyce Eagle, RN)  Skin Health and Integrity: making progress toward outcome     Problem: Fall  Risk (Adult)  Goal: Identify Related Risk Factors and Signs and Symptoms  Outcome: Ongoing (interventions implemented as appropriate)  Flowsheets (Taken 9/10/2020 1806 by Joyce Eagle RN)  Related Risk Factors (Fall Risk): environment unfamiliar;slippery/uneven surfaces;sleep pattern alteration;gait/mobility problems;depression/anxiety  Signs and Symptoms (Fall Risk): presence of risk factors     Problem: Fall Risk (Adult)  Goal: Absence of Fall  Outcome: Ongoing (interventions implemented as appropriate)  Flowsheets (Taken 9/10/2020 1806 by Joyce Eagle RN)  Absence of Fall: making progress toward outcome     Problem: Pain, Chronic (Adult)  Goal: Identify Related Risk Factors and Signs and Symptoms  Outcome: Ongoing (interventions implemented as appropriate)     Problem: Pain, Chronic (Adult)  Goal: Acceptable Pain/Comfort Level and Functional Ability  Outcome: Ongoing (interventions implemented as appropriate)  Flowsheets (Taken 9/10/2020 1806 by Joyce Eagle RN)  Acceptable Pain/Comfort Level and Functional Ability: making progress toward outcome     Problem: Pneumonia (Adult)  Goal: Signs and Symptoms of Listed Potential Problems Will be Absent, Minimized or Managed (Pneumonia)  Outcome: Ongoing (interventions implemented as appropriate)  Flowsheets (Taken 9/10/2020 1806 by Joyce Eagle RN)  Problems Assessed (Pneumonia): respiratory compromise  Problems Present (Pneumonia): infection progression     Problem: Skin Injury Risk (Adult)  Intervention: Prevent/Manage Excess Moisture  Flowsheets  Taken 9/11/2020 0800 by Tamara Porras, RN  Perineal Care: absorbent pad changed;perineum cleansed  Bathing/Skin Care: linen changed;dressed/undressed  Taken 9/11/2020 0400 by Elma Angeles RN  Skin Protection: adhesive use limited;electrode sites changed;incontinence pads utilized;pulse oximeter probe site changed;skin-to-skin areas padded;skin-to-device areas padded;transparent dressing maintained      Problem: Skin Injury Risk (Adult)  Intervention: Maintain Head of Bed Elevation Less Than 30 Degrees as Tolerated  Flowsheets (Taken 9/11/2020 2200)  Head of Bed (HOB): HOB at 20-30 degrees     Problem: Skin Injury Risk (Adult)  Intervention: Prevent/Minimize Shear/Friction Injuries  Flowsheets (Taken 9/11/2020 2200)  Pressure Reduction Devices: specialty bed utilized  Positioning/Transfer Devices: pillows     Problem: Skin Injury Risk (Adult)  Intervention: Prevent or Minimize Pressure  Flowsheets (Taken 9/11/2020 2200)  Pressure Reduction Techniques: frequent weight shift encouraged  Body Position: position changed independently     Problem: Fall Risk (Adult)  Intervention: Monitor/Assist with Self Care  Flowsheets  Taken 9/11/2020 2200 by Audi James RN  Activity Assistance Provided: assistance, 1 person  Taken 9/11/2020 0800 by Tamara Porras RN  Self-Care Promotion: independence encouraged     Problem: Fall Risk (Adult)  Intervention: Reduce Risk/Promote Restraint Free Environment  Flowsheets (Taken 9/11/2020 2200)  Safety Promotion/Fall Prevention: nonskid shoes/slippers when out of bed;safety round/check completed  Environmental Safety Modification: assistive device/personal items within reach;clutter free environment maintained  Safety/Security Measures: bed alarm set     Problem: Fall Risk (Adult)  Intervention: Review Medications/Identify Contributors to Fall Risk  Flowsheets (Taken 9/11/2020 2000)  Medication Review/Management: medications reviewed     Problem: Pain, Chronic (Adult)  Intervention: Manage Persistent Pain Analgesia  Flowsheets (Taken 9/11/2020 2000)  Medication Review/Management: medications reviewed     Problem: Pain, Chronic (Adult)  Intervention: Support Psychosocial Response to Persistent Pain  Flowsheets (Taken 9/11/2020 2000)  Supportive Measures: active listening utilized  Diversional Activities: television     Problem: Pain, Chronic (Adult)  Intervention: Mutually  Develop/Implement Persistent Pain Management Plan  Flowsheets (Taken 9/11/2020 2000)  Pain Management Interventions: see MAR  Sensory Stimulation Regulation: music/television provided for relaxation;care clustered     Problem: Pneumonia (Adult)  Intervention: Maximize Oxygenation/Ventilation/Perfusion  Flowsheets  Taken 9/11/2020 2200  Head of Bed (HOB): HOB at 20-30 degrees  Taken 9/11/2020 2000  Airway/Ventilation Management: calming measures promoted     Problem: Pneumonia (Adult)  Intervention: Prevent/Manage Infection Progression  Flowsheets (Taken 9/11/2020 0400 by Elma Angeles, RN)  Infection Prevention: single patient room provided;visitors restricted/screened     Problem: Pneumonia (Adult)  Intervention: Monitor/Manage Fluid Electrolyte Balance  Flowsheets (Taken 9/11/2020 2000)  Fluid/Electrolyte Management: fluids provided

## 2020-09-12 NOTE — PLAN OF CARE
Pt participated in dressing act w/ sup, amb 80 ft w/ R/W sup, transfers w/ supervision,. Completed ue ex as johnna, pt demo from of b shoulders but has pain in both reports she has bone spurs in shoulders. Pt is to d/c home w/ HHOT recommended and family to assist.

## 2020-09-12 NOTE — PLAN OF CARE
Goal Outcome Evaluation:  Plan of Care Reviewed With: patient  Progress: improving  Outcome Summary: Pt agreeable to therapy. Pt performed sup B LE ther ex and t/f to sitting EOB with CGA. Pt performed seated B LE ther ex and then stood with CGA and amb 18ft with RW with CGA. Pt with no c/o voiced at completion of tx. Nsg notified of pt up in chair. Pt would cont to benefit from therapy upon DC.

## 2020-09-12 NOTE — THERAPY TREATMENT NOTE
Acute Care - Occupational Therapy Treatment Note  [facility]     Patient Name: Theresa Esquivel  : 1955  MRN: [id number]  Today's Date: 2020  Onset of Illness/Injury or Date of Surgery: 20     Referring Physician: Dr. Naranjo    Admit Date: 2020       ICD-10-CM ICD-9-CM   1. Respiratory failure with hypoxia and hypercapnia, unspecified chronicity (CMS/HCC)  J96.91 518.81    J96.92    2. Pneumonia of both lungs due to infectious organism, unspecified part of lung  J18.9 483.8   3. Elevated d-dimer  R79.89 790.92   4. Pharyngeal dysphagia  R13.13 787.23   5. Impaired functional mobility, balance, gait, and endurance  Z74.09 V49.89   6. Impaired mobility and ADLs  Z74.09 V49.89    Z78.9      Patient Active Problem List   Diagnosis   • Respiratory failure with hypoxia and hypercapnia (CMS/HCC)   • Pneumonia of both lungs due to infectious organism     Past Medical History:   Diagnosis Date   • Acute bronchitis, unspecified    • Acute exacerbation of chronic bronchitis (CMS/HCC)    • Acute exacerbation of chronic bronchitis (CMS/HCC)    • Acute frontal sinusitis, unspecified    • Acute laryngitis    • Allergic rhinitis    • Anxiety    • Cancer (CMS/HCC)    • Chronic back pain    • Encounter for therapeutic drug monitoring    • Family history of polyps in the colon    • H/O bone density study 2014   • H/O mammogram 2014   • Hyperlipidemia    • Hypertension    • Malignant neoplasm of female breast (CMS/HCC)      L breast, sp mastectomy      • Osteopenia    • Panic disorder    • Right lower quadrant pain     rule out appendicitis        Past Surgical History:   Procedure Laterality Date   • INJECTION OF MEDICATION  2016    Ramon(3)   • MASTECTOMY  2003    left breast for cancer   • PAP SMEAR  2013    negative for malignancy          OT ASSESSMENT FLOWSHEET (last 12 hours)      OT Evaluation and Treatment     Row Name 20 0949                   OT Time and Intention     Subjective Information  no complaints  -RC        Document Type  therapy note (daily note)  -RC        Mode of Treatment  occupational therapy  -RC        Total Minutes, Occupational Therapy  55  -RC        Patient Effort  good  -RC           Cognition    Affect/Mental Status (Cognitive)  WFL  -           Pain Scale: Numbers Pre/Post-Treatment    Pretreatment Pain Rating  0/10 - no pain  -RC        Posttreatment Pain Rating  0/10 - no pain  -RC           Functional Mobility    Functional Mobility- Ind. Level  supervision required  -        Functional Mobility- Device  rolling walker  -        Functional Mobility-Distance (Feet)  80  -RC           Transfers    Sit-Stand [place] (Transfers)  supervision  -RC        Stand-Sit [place] (Transfers)  supervision  -RC           Sit-Stand Transfer    Assistive Device (Sit-Stand Transfers)  walker, front-wheeled  -RC           Stand-Sit Transfer    Assistive Device (Stand-Sit Transfers)  walker, front-wheeled  -RC           Safety Issues, Functional Mobility    Safety Issues Affecting Function (Mobility)  impulsivity  -RC           Shoulder (Therapeutic Exercise)    Shoulder (Therapeutic Exercise)  AROM (active range of motion)  -        Shoulder AROM (Therapeutic Exercise)  flexion;extension;scapular elevation;scapular protraction;left;right  -RC           Elbow/Forearm (Therapeutic Exercise)    Elbow/Forearm (Therapeutic Exercise)  AROM (active range of motion)  -        Elbow/Forearm AROM (Therapeutic Exercise)  bilateral;flexion;extension  -RC           Lower Body Dressing Assessment/Training    [place] Level (Lower Body Dressing)  don;pants/bottoms;shoes/slippers;socks;set up  -           Vital Signs    Pre Systolic BP Rehab  167  -RC        Pre Treatment Diastolic BP  87  -RC        Pretreatment Heart Rate (beats/min)  72  -RC        Pre SpO2 (%)  93  -RC        O2 Delivery Pre Treatment  room air  -          User Key  (r) = Recorded By, (t)  = Taken By, (c) = Cosigned By    Initials Name Effective Dates     Luzma EffieMUKUL Guzman/ISAAC 03/07/18 -          Occupational Therapy Education                 Title: PT OT SLP Therapies (Resolved)     Topic: Occupational Therapy (Resolved)     Point: ADL training (Resolved)     Description:   Instruct learner(s) on proper safety adaptation and remediation techniques during self care or transfers.   Instruct in proper use of assistive devices.              Learning Progress Summary           Patient Acceptance, E,TB, VU by CM at 9/11/2020 0943    Acceptance, E, VU,NR by AS at 9/10/2020 1341    Comment: role of OT, OT POC, ADL training, transfer training                   Point: Home exercise program (Resolved)     Description:   Instruct learner(s) on appropriate technique for monitoring, assisting and/or progressing therapeutic exercises/activities.              Learning Progress Summary           Patient Acceptance, E,TB, VU by CM at 9/11/2020 0943                   Point: Precautions (Resolved)     Description:   Instruct learner(s) on prescribed precautions during self-care and functional transfers.              Learning Progress Summary           Patient Acceptance, E, NR by  at 9/11/2020 1056    Acceptance, E,TB, VU by CM at 9/11/2020 0943    Acceptance, E, VU,NR by AS at 9/10/2020 1341    Comment: role of OT, OT POC, ADL training, transfer training                   Point: Body mechanics (Resolved)     Description:   Instruct learner(s) on proper positioning and spine alignment during self-care, functional mobility activities and/or exercises.              Learning Progress Summary           Patient Acceptance, E, NR by  at 9/11/2020 1056    Acceptance, E,TB, VU by CM at 9/11/2020 0943                               User Key     Initials Effective Dates Name Provider Type Discipline     03/07/18 -  Luzma Effie G, GILMAN/L Occupational Therapy Assistant OT    CM 05/28/19 -  Tamara Porras, RN  Registered Nurse Nurse    AS 07/05/20 -  Altagracia Rowan, OT Occupational Therapist OT                  OT Recommendation and Plan  Therapy Frequency (OT): (6-7 days per week )  Progress Toward Functional Goals (OT): progress toward functional goals as expected  Plan of Care Review  Plan of Care Reviewed With: patient  Progress: improving  Outcome Summary: participated in ue ex until MT arrived to take to new room. johnna w/o co. transfered w/ cga.  cont poc  Plan of Care Reviewed With: patient  Outcome Summary: participated in ue ex until MT arrived to take to new room. johnna w/o co. transfered w/ cga.  cont poc    Outcome Measures     Row Name 09/12/20 0949 09/11/20 1320 09/11/20 1027       How much help from another person do you currently need...    Turning from your back to your side while in flat bed without using bedrails?  --  3  -TW  --    Moving from lying on back to sitting on the side of a flat bed without bedrails?  --  3  -TW  --    Moving to and from a bed to a chair (including a wheelchair)?  --  3  -TW  --    Standing up from a chair using your arms (e.g., wheelchair, bedside chair)?  --  3  -TW  --    Climbing 3-5 steps with a railing?  --  3  -TW  --    To walk in hospital room?  --  3  -TW  --    AM-PAC 6 Clicks Score (PT)  --  18  -TW  --       How much help from another is currently needed...    Putting on and taking off regular lower body clothing?  3  -RC  --  2  -RC    Bathing (including washing, rinsing, and drying)  3  -RC  --  2  -RC    Toileting (which includes using toilet bed pan or urinal)  3  -RC  --  2  -RC    Putting on and taking off regular upper body clothing  3  -RC  --  2  -RC    Taking care of personal grooming (such as brushing teeth)  4  -RC  --  3  -RC    Eating meals  4  -RC  --  3  -RC    AM-PAC 6 Clicks Score (OT)  20  -RC  --  14  -RC    Row Name 09/10/20 0941             How much help from another is currently needed...    Putting on and taking off regular lower body  clothing?  2  -AS      Bathing (including washing, rinsing, and drying)  2  -AS      Toileting (which includes using toilet bed pan or urinal)  2  -AS      Putting on and taking off regular upper body clothing  2  -AS      Taking care of personal grooming (such as brushing teeth)  3  -AS      Eating meals  3  -AS      AM-PAC 6 Clicks Score (OT)  14  -AS         Functional Assessment    Outcome Measure Options  AM-PAC 6 Clicks Daily Activity (OT)  -AS        User Key  (r) = Recorded By, (t) = Taken By, (c) = Cosigned By    Initials Name Provider Type    TW Nato Andres, PTA Physical Therapy Assistant    Effie Garcia GILMAN/L Occupational Therapy Assistant    AS Altagracia Rowan, OT Occupational Therapist          Time Calculation:   Time Calculation- OT     Row Name 09/12/20 1325 09/12/20 1052          Time Calculation- OT    OT Start Time  0949  -RC  --  -RC     OT Stop Time  1044  -RC  --  -RC     OT Time Calculation (min)  55 min  -  --  -RC     Total Timed Code Minutes- OT  55 minute(s)  -  --  -RC     OT Received On  09/12/20  -  --  -       User Key  (r) = Recorded By, (t) = Taken By, (c) = Cosigned By    Initials Name Provider Type    Effie Garcia GILMAN/L Occupational Therapy Assistant        Therapy Charges for Today     Code Description Service Date Service Provider Modifiers Qty    98204434683 HC OT THER PROC EA 15 MIN 9/11/2020 Luzma, Effie G, GILMAN/L GO 1    33031377085 HC OT THERAPEUTIC ACT EA 15 MIN 9/12/2020 Luzma, Effie G, GILMAN/L GO 2    91659232271 HC OT THERAPEUTIC ACT EA 15 MIN 9/12/2020 Luzma, Effie G, GILMAN/L GO 1    87542182556 HC OT THER PROC EA 15 MIN 9/12/2020 Luzma, Effie G, GILMAN/L GO 1               Effie G Luzma, GILMAN/L  9/12/2020

## 2020-09-12 NOTE — PLAN OF CARE
Problem: Adult Inpatient Plan of Care  Goal: Plan of Care Review  Recent Flowsheet Documentation  Taken 9/12/2020 0858 by Nato Andres, PTA  Progress: improving  Plan of Care Reviewed With: patient  Outcome Summary: Pt able to t/f sup to sit with spv of 1 and performed B LE ther ex in sitting EOB for 15 reps. Pt stood with SBA and amb in room for 28ft using RW and CGA for safety only. Pt would cont to benefit from therapy upon DC.

## 2020-09-12 NOTE — THERAPY TREATMENT NOTE
Acute Care - Physical Therapy Treatment Note  [facility]     Patient Name: Theresa Esquivel  : 1955  MRN: [id number]  Today's Date: 2020   Onset of Illness/Injury or Date of Surgery: 20       PT Assessment (last 12 hours)      PT Evaluation and Treatment     Row Name 20 0858          Physical Therapy Time and Intention    Subjective Information  no complaints  -TW     Document Type  therapy note (daily note)  -TW     Mode of Treatment  physical therapy;individual therapy  -TW     Patient Effort  good  -TW     Row Name 20 0858          General Information    Existing Precautions/Restrictions  fall  -TW     Row Name 20 0858          Cognition    Orientation Status (Cognition)  oriented x 4  -TW     Follows Commands (Cognition)  follows one-step commands;over 90% accuracy  -TW     Cognitive Function (Cognitive)  WFL  -TW     Personal Safety Interventions  fall prevention program maintained;gait belt;nonskid shoes/slippers when out of bed  -TW     Row Name 20 0858          Pain Scale: Numbers Pre/Post-Treatment    Pretreatment Pain Rating  0/10 - no pain  -TW     Posttreatment Pain Rating  0/10 - no pain  -TW     Row Name 20 0858          Bed Mobility    Bed Mobility  sit-supine  -TW     Supine-Sit [place] (Bed Mobility)  modified independence  -TW     Sit-Supine [place] (Bed Mobility)  modified independence  -TW     Assistive Device (Bed Mobility)  head of bed elevated  -TW     Comment (Bed Mobility)  Pt sat EOB for 14 minutes to perform B LE ther ex.  -TW     Row Name 20 0858          Transfers    Sit-Stand [place] (Transfers)  supervision  -TW     Stand-Sit [place] (Transfers)  supervision  -TW     Row Name 20 0858          Sit-Stand Transfer    Assistive Device (Sit-Stand Transfers)  walker, front-wheeled  -TW     Row Name 2058          Stand-Sit Transfer    Assistive Device (Stand-Sit Transfers)  walker, front-wheeled  -TW      Row Name 09/12/20 0858          Gait/Stairs (Locomotion)    Gait/Stairs Locomotion  gait/ambulation assistive device  -TW     [place] Level (Gait)  contact guard  -TW     Assistive Device (Gait)  walker, front-wheeled  -TW     Distance in Feet (Gait)  28ft  -TW     Deviations/Abnormal Patterns (Gait)  base of support, narrow;stride length decreased  -TW     Bilateral Gait Deviations  forward flexed posture  -TW     Row Name 09/12/20 0858          Hip (Therapeutic Exercise)    Hip (Therapeutic Exercise)  AROM (active range of motion)  -TW     Hip AROM (Therapeutic Exercise)  bilateral;aBduction;aDduction;flexion;extension  -TW     Row Name 09/12/20 0858          Knee (Therapeutic Exercise)    Knee (Therapeutic Exercise)  AROM (active range of motion)  -TW     Knee AROM (Therapeutic Exercise)  bilateral;LAQ (long arc quad)  -TW     Row Name 09/12/20 0858          Ankle (Therapeutic Exercise)    Ankle (Therapeutic Exercise)  AROM (active range of motion)  -TW     Ankle AROM (Therapeutic Exercise)  bilateral;dorsiflexion;plantarflexion  -TW     Row Name 09/12/20 0858          Plan of Care Review    Plan of Care Reviewed With  patient  -TW     Progress  improving  -TW     Outcome Summary  Pt able to t/f sup to sit with spv of 1 and performed B LE ther ex in sitting EOB for 15 reps. Pt stood with SBA and amb in room for 28ft using RW and CGA for safety only. Pt would cont to benefit from therapy upon DC.  -TW     Row Name 09/12/20 0858          Vital Signs    Pre Systolic BP Rehab  158  -TW     Pre Treatment Diastolic BP  84  -TW     Pretreatment Heart Rate (beats/min)  74  -TW     Posttreatment Heart Rate (beats/min)  72  -TW     Pre SpO2 (%)  94  -TW     O2 Delivery Pre Treatment  room air  -TW     Post SpO2 (%)  95  -TW     Pre Patient Position  Supine  -TW     Intra Patient Position  Standing  -TW     Post Patient Position  Supine  -TW     Row Name 09/12/20 0858          Bed Mobility Goal 1 (PT)     Activity/Assistive Device (Bed Mobility Goal 1, PT)  bed mobility activities, all  -TW     [place] Level/Cues Needed (Bed Mobility Goal 1, PT)  independent  -TW     Time Frame (Bed Mobility Goal 1, PT)  long term goal (LTG);by discharge  -TW     Progress/Outcomes (Bed Mobility Goal 1, PT)  goal not met  -TW     Row Name 09/12/20 0858          Transfer Goal 1 (PT)    Activity/Assistive Device (Transfer Goal 1, PT)  sit-to-stand/stand-to-sit;walker, rolling  -TW     [place] Level/Cues Needed (Transfer Goal 1, PT)  modified independence  -TW     Time Frame (Transfer Goal 1, PT)  long term goal (LTG);by discharge  -TW     Progress/Outcome (Transfer Goal 1, PT)  goal not met  -TW     Row Name 09/12/20 0858          Gait Training Goal 1 (PT)    Activity/Assistive Device (Gait Training Goal 1, PT)  gait (walking locomotion);assistive device use;decrease fall risk;improve balance and speed;increase endurance/gait distance;walker, rolling  -TW     [place] Level (Gait Training Goal 1, PT)  modified independence  -TW     Distance (Gait Training Goal 1, PT)  150' x2 or more  -TW     Time Frame (Gait Training Goal 1, PT)  long term goal (LTG);by discharge  -TW     Progress/Outcome (Gait Training Goal 1, PT)  goal not met  -TW     Row Name 09/12/20 0858          Positioning and Restraints    Pre-Treatment Position  in bed  -TW     Post Treatment Position  bed  -TW     In Bed  supine;call light within reach;encouraged to call for assist;exit alarm on  -TW     Row Name 09/12/20 0858          Therapy Assessment/Plan (PT)    Rehab Potential (PT)  good, to achieve stated therapy goals  -TW     Criteria for Skilled Interventions Met (PT)  yes  -TW     Row Name 09/12/20 0858          Progress Summary (PT)    Progress Toward Functional Goals (PT)  progress toward functional goals is good  -TW       User Key  (r) = Recorded By, (t) = Taken By, (c) = Cosigned By    Initials Name Provider Type    TW Nato Andres, PTA  Physical Therapy Assistant        Physical Therapy Education                 Title: PT OT SLP Therapies (Resolved)     Topic: Physical Therapy (Resolved)     Point: Mobility training (Resolved)     Learning Progress Summary           Patient Acceptance, E,TB, VU by CM at 9/11/2020 0943    Acceptance, E, NR,VU by KW at 9/10/2020 1306    Comment: PT POC and prognosis; benefits of using RW while recovering; safe hand placement with FWW                   Point: Home exercise program (Resolved)     Learning Progress Summary           Patient Acceptance, E,TB, VU by CM at 9/11/2020 0943                   Point: Body mechanics (Resolved)     Learning Progress Summary           Patient Acceptance, E,TB, VU by CM at 9/11/2020 0943    Acceptance, E, NR,VU by KW at 9/10/2020 1306    Comment: PT POC and prognosis; benefits of using RW while recovering; safe hand placement with FWW                   Point: Precautions (Resolved)     Learning Progress Summary           Patient Acceptance, E,TB, VU by CM at 9/11/2020 0943    Acceptance, E, NR,VU by KW at 9/10/2020 1306    Comment: PT POC and prognosis; benefits of using RW while recovering; safe hand placement with FWW                               User Key     Initials Effective Dates Name Provider Type Discipline     05/28/19 -  Tamara Porras, RN Registered Nurse Nurse     08/09/20 -  Georgi Rowe, PT Physical Therapist PT              PT Recommendation and Plan  Anticipated Discharge Disposition (PT): anticipate therapy at next level of care  Planned Therapy Interventions (PT): balance training, gait training, home exercise program, patient/family education, stair training, strengthening, stretching, transfer training  Therapy Frequency (PT): other (see comments)(6-11x/wk)  Progress Summary (PT)  Progress Toward Functional Goals (PT): progress toward functional goals is good  Plan of Care Reviewed With: patient  Progress: improving  Outcome Summary: Pt able to t/f sup  to sit with spv of 1 and performed B LE ther ex in sitting EOB for 15 reps. Pt stood with SBA and amb in room for 28ft using RW and CGA for safety only. Pt would cont to benefit from therapy upon DC.  Outcome Measures     Row Name 09/12/20 0949 09/12/20 0858 09/11/20 1320       How much help from another person do you currently need...    Turning from your back to your side while in flat bed without using bedrails?  --  3  -TW  3  -TW    Moving from lying on back to sitting on the side of a flat bed without bedrails?  --  3  -TW  3  -TW    Moving to and from a bed to a chair (including a wheelchair)?  --  3  -TW  3  -TW    Standing up from a chair using your arms (e.g., wheelchair, bedside chair)?  --  3  -TW  3  -TW    Climbing 3-5 steps with a railing?  --  3  -TW  3  -TW    To walk in hospital room?  --  3  -TW  3  -TW    AM-PAC 6 Clicks Score (PT)  --  18  -TW  18  -TW       How much help from another is currently needed...    Putting on and taking off regular lower body clothing?  3  -RC  --  --    Bathing (including washing, rinsing, and drying)  3  -RC  --  --    Toileting (which includes using toilet bed pan or urinal)  3  -RC  --  --    Putting on and taking off regular upper body clothing  3  -RC  --  --    Taking care of personal grooming (such as brushing teeth)  4  -RC  --  --    Eating meals  4  -RC  --  --    AM-PAC 6 Clicks Score (OT)  20  -RC  --  --    Row Name 09/11/20 1027 09/10/20 0941          How much help from another is currently needed...    Putting on and taking off regular lower body clothing?  2  -RC  2  -AS     Bathing (including washing, rinsing, and drying)  2  -RC  2  -AS     Toileting (which includes using toilet bed pan or urinal)  2  -RC  2  -AS     Putting on and taking off regular upper body clothing  2  -RC  2  -AS     Taking care of personal grooming (such as brushing teeth)  3  -RC  3  -AS     Eating meals  3  -RC  3  -AS     AM-PAC 6 Clicks Score (OT)  14  -RC  14  -AS         Functional Assessment    Outcome Measure Options  --  AM-PAC 6 Clicks Daily Activity (OT)  -AS       User Key  (r) = Recorded By, (t) = Taken By, (c) = Cosigned By    Initials Name Provider Type    Nato Bahena PTA Physical Therapy Assistant    Effie Garcia, GILMAN/L Occupational Therapy Assistant    AS Altagracia Rowan, OT Occupational Therapist           Time Calculation:   PT Charges     Row Name 09/12/20 1403             Time Calculation    Start Time  0858  -TW      Stop Time  0923  -TW      Time Calculation (min)  25 min  -TW      PT Received On  09/12/20  -TW         Time Calculation- PT    Total Timed Code Minutes- PT  25 minute(s)  -TW        User Key  (r) = Recorded By, (t) = Taken By, (c) = Cosigned By    Initials Name Provider Type    Nato Bahena PTA Physical Therapy Assistant        Therapy Charges for Today     Code Description Service Date Service Provider Modifiers Qty    66190865915 HC GAIT TRAINING EA 15 MIN 9/11/2020 Nato Andres PTA GP 1    62332595329 HC PT THERAPEUTIC ACT EA 15 MIN 9/11/2020 Nato Andres, YE GP 1    27840344920 HC PT THER PROC EA 15 MIN 9/11/2020 Nato Andres, PTA GP 1    63863636624 HC GAIT TRAINING EA 15 MIN 9/12/2020 Nato Andres, YE GP 1    97739233101 HC PT THER PROC EA 15 MIN 9/12/2020 Nato Andres, YE GP 1          PT G-Codes  Outcome Measure Options: AM-PAC 6 Clicks Daily Activity (OT)  AM-PAC 6 Clicks Score (PT): 18  AM-PAC 6 Clicks Score (OT): 20    Nato Andres PTA  9/12/2020

## 2020-09-13 ENCOUNTER — READMISSION MANAGEMENT (OUTPATIENT)
Dept: CALL CENTER | Facility: HOSPITAL | Age: 65
End: 2020-09-13

## 2020-09-13 NOTE — OUTREACH NOTE
Prep Survey      Responses   Congregational facility patient discharged from?  [place]   Is LACE score < 7 ?  No   Eligibility  Readm Mgmt   Discharge diagnosis  Pneumonia of both lungs due to infectious organism   Does the patient have one of the following disease processes/diagnoses(primary or secondary)?  COPD/Pneumonia   Does the patient have Home health ordered?  Yes   What is the Home health agency?   [facility]    Prep survey completed?  Yes          Eboni Lopez RN

## 2020-09-14 NOTE — PAYOR COMM NOTE
"Gisselle Murillo  [facility]  P :[phone]  F: [phone]        auth#[id number]    Theresa Esquivel (64 y.o. Female)     Date of Birth Social Security Number Address Home Phone MRN    1955  [address] [place] [facility] [address] [phone] [id number]    Jehovah's witness Marital Status          Sabianism Legally        Admission Date Admission Type Admitting Provider Attending Provider Department, Room/Bed    9/4/20 Emergency Dagoberto Crum MD  [facility] 3 [place], 304/1    Discharge Date Discharge Disposition Discharge Destination        9/12/2020 Home-Health Care c              Attending Provider: (none)   Allergies: Augmentin [Amoxicillin-pot Clavulanate], Ciprofloxacin, Codeine, Eggs Or Egg-derived Products, Paxil [Paroxetine Hcl]    Isolation: None   Infection: None   Code Status: Prior    Ht: 165.1 cm (65\")   Wt: 76.4 kg (168 lb 6.4 oz)    Admission Cmt: None   Principal Problem: None                Active Insurance as of 9/4/2020     Primary Coverage     Payor Plan Insurance Group Employer/Plan Group    [facility]      Payor Plan Address Payor Plan Phone Number Payor Plan Fax Number Effective Dates    [address] [address]   1/1/2014 - None Entered    [place] [address]       Subscriber Name Subscriber Birth Date Member ID       THERESA ESQUIVEL 1955 [id number]                 Emergency Contacts      (Rel.) Home Phone Work Phone Mobile Phone    Connie Collins (Daughter) [phone] -- --    Aryan Hanaz (Grandchild) -- -- [phone]    CaraEulogio tolentino (Son) -- -- [phone]               Discharge Summary      Goldie Alonso APRN at 09/12/20 1044     Attestation signed by Sage Norman MD at 09/12/20 9974    I personally evaluated and examined the patient in conjunction with BRITTANY Robert and agree with the assessment, treatment plan, and disposition of the patient as " recorded.     Pt no distress.  Agree with plan.                       [facility] Medicine Services  DISCHARGE SUMMARY       Date of Admission: 9/4/2020  Date of Discharge:  9/12/2020  Primary Care Physician: Pat Montiel APRN    Presenting Problem/History of Present Illness:  Elevated d-dimer [R79.89]  Pneumonia of both lungs due to infectious organism, unspecified part of lung [J18.9]  Respiratory failure with hypoxia and hypercapnia, unspecified chronicity (CMS/HCC) [J96.91, J96.92]       Final Discharge Diagnoses:  Active Hospital Problems    Diagnosis   • Pneumonia of both lungs due to infectious organism   • Respiratory failure with hypoxia and hypercapnia (CMS/HCC)       Consults:   Consults     Date and Time Order Name Status Description    9/6/2020 0858 Inpatient Pulmonology Consult Completed           Procedures Performed:                 Pertinent Test Results:   Lab Results (last 24 hours)     Procedure Component Value Units Date/Time    Basic Metabolic Panel [[id number]]  (Abnormal) Collected: 09/12/20 0517    Specimen: Blood Updated: 09/12/20 0654     Glucose 114 mg/dL      BUN 23 mg/dL      Creatinine 0.85 mg/dL      Sodium 137 mmol/L      Potassium 4.0 mmol/L      Comment: Slight hemolysis detected by analyzer. Results may be affected.        Chloride 99 mmol/L      CO2 26.0 mmol/L      Calcium 9.7 mg/dL      eGFR Non African Amer 67 mL/min/1.73      BUN/Creatinine Ratio 27.1     Anion Gap 12.0 mmol/L     Narrative:      GFR Normal >60  Chronic Kidney Disease <60  Kidney Failure <15      Magnesium [[id number]]  (Normal) Collected: 09/12/20 0517    Specimen: Blood Updated: 09/12/20 0654     Magnesium 2.4 mg/dL     Phosphorus [[id number]]  (Normal) Collected: 09/12/20 0517    Specimen: Blood Updated: 09/12/20 0654     Phosphorus 4.1 mg/dL     POC Glucose Once [[id number]]  (Normal) Collected: 09/12/20 0615    Specimen: Blood Updated: 09/12/20 0635      Glucose 103 mg/dL     CBC & Differential [[id number]]  (Abnormal) Collected: 09/12/20 0517    Specimen: Blood Updated: 09/12/20 0630    Narrative:      The following orders were created for panel order CBC & Differential.  Procedure                               Abnormality         Status                     ---------                               -----------         ------                     CBC Auto Differential[[id number]]        Abnormal            Final result                 Please view results for these tests on the individual orders.    CBC Auto Differential [[id number]]  (Abnormal) Collected: 09/12/20 0517    Specimen: Blood Updated: 09/12/20 0630     WBC 12.16 10*3/mm3      RBC 5.25 10*6/mm3      Hemoglobin 15.6 g/dL      Hematocrit 47.7 %      MCV 90.9 fL      MCH 29.7 pg      MCHC 32.7 g/dL      RDW 12.9 %      RDW-SD 42.3 fl      MPV 10.2 fL      Platelets 440 10*3/mm3      Neutrophil % 70.0 %      Lymphocyte % 22.5 %      Monocyte % 5.7 %      Eosinophil % 0.2 %      Basophil % 0.2 %      Immature Grans % 1.4 %      Neutrophils, Absolute 8.50 10*3/mm3      Lymphocytes, Absolute 2.74 10*3/mm3      Monocytes, Absolute 0.69 10*3/mm3      Eosinophils, Absolute 0.03 10*3/mm3      Basophils, Absolute 0.03 10*3/mm3      Immature Grans, Absolute 0.17 10*3/mm3      nRBC 0.0 /100 WBC     POC Glucose Once [[id number]]  (Abnormal) Collected: 09/11/20 2000    Specimen: Blood Updated: 09/11/20 2023     Glucose 157 mg/dL     POC Glucose Once [[id number]]  (Normal) Collected: 09/11/20 1638    Specimen: Blood Updated: 09/11/20 1758     Glucose 108 mg/dL     POC Glucose Once [[id number]]  (Normal) Collected: 09/11/20 1105    Specimen: Blood Updated: 09/11/20 1757     Glucose 108 mg/dL         Imaging Results (All)     Procedure Component Value Units Date/Time    CT Head Without Contrast [[id number]] Collected: 09/08/20 1746     Updated: 09/08/20 1805    Narrative:        CT Head Without Contrast    History: Decreased  alertness. Respiratory failure. Hypoxia.  Pneumonia.    Axial scans of the brain were obtained without intravenous  contrast.  Coronal and sagital reconstructions were preformed.    This exam was performed according to our departmental  dose-optimization program, which includes automated exposure  control, adjustment of the mA and/or kV according to patient size  and/or use of iterative reconstruction technique.    DLP: 961.80    Comparison: None    Findings:  Bone windows are unremarkable.  Fluid opacifies the majority of the right maxillary sinus.  Nasogastric tube in place.  Endotracheal tube in place.    Minimal cerebral atrophy.  Minimal small vessel disease.  No hemorrhage.  No mass.  No abnormal areas of increased attenuation.  No midline shift.  No abnormal extra-axial fluid collections.      Impression:      CONCLUSION:  Acute right maxillary sinusitis.  Nasogastric tube in place.  Endotracheal tube in place.  Minimal cerebral atrophy.  Minimal small vessel disease.    [address]    Electronically signed by:  Satnam Johnson MD  9/8/2020 6:04 PM CDT  Workstation: [id number]    XR Chest 1 View [[id number]] Collected: 09/07/20 1007     Updated: 09/07/20 1051    Narrative:        PROCEDURE: Single chest view portable erect    REASON FOR EXAM:post intubation, J96.91 Respiratory failure,  unspecified with hypoxia J96.92 Respiratory failure, unspecified  with hypercapnia J18.9 Pneumonia, unspecified organism R79.89  Other specified abnormal findings of blood chemistry    FINDINGS: Comparison exam dated September 7, 2020. ET tube with  tip 4 cm  above melissa. Feeding tube with tip within the region  of the distal duodenum proximal jejunum. Cardiac and pulmonary  vasculature are normal. Stable calcified left perihilar lymph  nodes consistent with old granulomatous disease. Left lung base  small linear patchy opacity. Lungs are otherwise clear. Pleural  spaces are normal. No acute osseous abnormality.      Impression:      1.   Tubes as described above.  2.  Evidence of old granulomatous disease.  3.  Left lung base small linear opacities suspicious for discoid  atelectasis versus early pneumonia.    Electronically signed by:  Tyler Childers MD  9/7/2020 10:50 AM CDT  Workstation: [id number]    XR Chest 1 View [[id number]] Collected: 09/07/20 0530     Updated: 09/07/20 0728    Narrative:        PROCEDURE: Single chest view portable    REASON FOR EXAM:Follow-up respiratory failure., J96.91  Respiratory failure, unspecified with hypoxia J96.92 Respiratory  failure, unspecified with hypercapnia J18.9 Pneumonia,  unspecified organism R79.89 Other specified abnormal findings of  blood chemistry    FINDINGS: Comparison exam dated September 4, 2020. ET tube with  tip 3 cm above melissa. Feeding tube with tip below level  diaphragm. Cardiac size appears within normal limits. Mild  pulmonary vasculature redistribution. Marked improvement in  bilateral perihilar and bibasilar interstitial opacities with  residual minimal interstitial opacities in the lung bases. Lungs  are otherwise clear. Pleural spaces are normal. No acute osseous  abnormality.      Impression:      1.  Marked improvement in bilateral perihilar and bibasilar  interstitial opacities with residual minimal interstitial  opacities in the lung bases. This suggests marked improvement in  pulmonary edema and/or pneumonia.  2.  Tubes as described above.    Electronically signed by:  Tyler hCilders MD  9/7/2020 7:27 AM CDT  Workstation: [id number]    XR Abdomen KUB [[id number]] Collected: 09/06/20 1541     Updated: 09/06/20 1614    Narrative:      Exam:  KUB    History:  Cortrak placement    Supine film of the abdomen was obtained portably at 3:41 PM.    Comparison: December 23, 2013    Feeding tube in place with tip in the left upper quadrant,  presumably in the proximal jejunum.  Razo catheter.  No mechanical bowel obstruction.  No organomegaly.  Pelvic phleboliths.  Atherosclerotic  calcification abdominal aorta..  No acute osseous abnormality.      Impression:      Conclusion:  Feeding tube in place with tip in the left upper quadrant,  presumably in the proximal jejunum.      [address]    Electronically signed by:  Satnam Johnson MD  9/6/2020 4:13 PM CDT  Workstation: [id number]    CT Angiogram Chest [[id number]] Collected: 09/05/20 0128     Updated: 09/05/20 0200    Narrative:        CT angiogram chest with contrast on 9/5/2020     CLINICAL INDICATION: Shortness of breath, elevated d-dimer    TECHNIQUE: Multiple axial images are obtained throughout the  chest following the administration of IV contrast.  Computer  generated 3D reconstructions/MIPS were performed. This exam was  performed according to our departmental dose-optimization  program, which includes automated exposure control, adjustment of  the mA and/or kV according to patient size and/or use of  iterative reconstruction technique.  Total DLP is 399 mGy*cm.    COMPARISON: None     FINDINGS: ET tube tip is in the midthoracic trachea. NG tube  extends into the upper stomach in good position. 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 There is fatty infiltration of the liver. Limited  visualized upper abdomen is otherwise unremarkable. There is no  pleural or pericardial effusion. There are no filling defects  within the pulmonary arteries to suggest a pulmonary embolus.  There is evidence of calcified granulomatous disease in the  chest. There is left greater than right lower lobe consolidation  consistent with pneumonia, consider aspiration. There are other  patchy bilateral groundglass and airspace opacities likely also  infectious in nature. Differential diagnosis would include viral  infections. No bony abnormality is noted.      Impression:      1. No evidence of pulmonary embolus.  2. Bilateral lower lobe areas of consolidation greatest in the  left lower lobe consistent with pneumonia  with other bilateral  opacities also likely infectious in nature. Imaging features can  be seen with a viral or COVID 19 pneumonia, though are  nonspecific and can occur with a variety of infectious and  noninfectious processes. [PneInd]    Electronically signed by:  Devonte Alvarez  9/5/2020 1:59 AM CDT  Workstation: [id number]    XR Chest 1 View [[id number]] Collected: 09/04/20 2310     Updated: 09/04/20 2328    Narrative:      PORTABLE CHEST X-RAY    CLINICAL HISTORY: CHF/COPD Protocol    COMPARISON: 6/20/2017.    FINDINGS: Portable AP view of the chest was obtained with  overlying monitor leads in place. ET tube terminates at the level  of the mid thoracic trachea. Nasogastric tube is coiled beneath  the left hemidiaphragm terminating in the region of the proximal  stomach. Lungs are poorly aerated. There are left greater than  right groundglass and interstitial opacities favored to be  related to edema rather than pneumonia. There are calcified  residua of granulomatous disease present. No significant pleural  fluid. Normal heart size.      Impression:      Life support lines as above, lungs are under aerated  with left greater than right pulmonary opacities as discussed      Electronically signed by:  Alfonso Medrano MD  9/4/2020 11:27 PM  CDT Workstation: [id number]            Chief Complaint on Day of Discharge: none    Hospital Course:  64-year-old  female with past medical history of tobacco abuse, previous breast cancer, chronic pain, hypertension, hyperlipidemia who presented on 9/4/2020 with hypoxic and hypercapnic respiratory failure requiring intubation.  Patient required intubation and mechanical ventilation through 9/9/2020 and was followed by pulmonology team for ventilator assistance.  Bilateral lower lobe consolidations were noted on admission  And patient was tested for COVID-19.  COVID-19 test was negative.  Patient was treated with IV antibiotic therapy, ventilator support, and inhalers.  " Blood and respiratory culture showed no growth of organism.  CTA of chest was performed and ruled out pulmonary embolism.  Post extubation on 9/9 patient had no issues.  She was able to be transferred to medical unit and is currently participating well with PT, OT and tolerating food intake without issue.  She remains deconditioned due to her illness and has been recommended home health PT, OT, and respiratory assessments at discharge which has been arranged by case management.  She is weaned to room air and will be discharged home in stable condition with instructions for one week PCP follow up.     Condition on Discharge:  stable    Physical Exam on Discharge:  /93 (BP Location: Right leg, Patient Position: Lying)   Pulse 89   Temp 97.3 °F (36.3 °C) (Oral)   Resp 16   Ht 165.1 cm (65\")   Wt 76.4 kg (168 lb 6.4 oz)   SpO2 94%   BMI 28.02 kg/m²   Physical Exam  Vitals signs and nursing note reviewed.   Constitutional:       General: She is not in acute distress.     Appearance: She is well-developed and well-nourished. She is not diaphoretic.   HENT:      Head: Normocephalic and atraumatic.      Right Ear: External ear normal.      Left Ear: External ear normal.      Nose: Nose normal.   Eyes:      General: No scleral icterus.        Right eye: No discharge.         Left eye: No discharge.      Conjunctiva/sclera: Conjunctivae normal.   Neck:      Musculoskeletal: Normal range of motion and neck supple.      Vascular: No JVD.   Cardiovascular:      Rate and Rhythm: Normal rate and regular rhythm.      Pulses: Intact distal pulses. Pulses are strong.      Heart sounds: Normal heart sounds. No murmur. No friction rub. No gallop.    Pulmonary:      Effort: Pulmonary effort is normal. No respiratory distress.      Breath sounds: Normal breath sounds. No stridor. No wheezing or rales.   Abdominal:      General: Bowel sounds are normal. There is no distension.      Palpations: Abdomen is soft.      " Tenderness: There is no abdominal tenderness.   Musculoskeletal: Normal range of motion.         General: No edema.   Skin:     General: Skin is warm and dry.   Neurological:      Mental Status: She is alert and oriented to person, place, and time.   Psychiatric:         Mood and Affect: Mood and affect normal.         Behavior: Behavior normal.           [place] [facility] [place]charge Medications:     Discharge Medications      New Medications      Instructions Start Date   amLODIPine 10 MG tablet  Commonly known as: NORVASC   10 mg, Oral, Every 24 Hours Scheduled   Start Date: September 13, 2020     benzonatate 100 MG capsule  Commonly known as: TESSALON   100 mg, Oral, 3 Times Daily PRN      doxycycline 100 MG capsule  Commonly known as: MONODOX   100 mg, Oral, Every 12 Hours Scheduled      predniSONE 20 MG tablet  Commonly known as: DELTASONE   40 mg, Oral, Daily With Breakfast   Start Date: September 13, 2020        Continue These Medications      Instructions Start Date   HYDROcodone-acetaminophen 5-325 MG per tablet  Commonly known as: NORCO   1 tablet, Oral, Every 8 Hours PRN         Stop These Medications    lisinopril 10 MG tablet  Commonly known as: PRINIVIL,ZESTRIL            Discharge Diet:   Diet Instructions     Diet: Cardiac; Thin      Discharge Diet: Cardiac    Fluid Consistency: Thin          Activity at Discharge:   Activity Instructions     Activity as Tolerated            Discharge Care Plan/Instructions: Follow up with PCP within one week.      Follow-up Appointments:   Additional Instructions for the Follow-ups that You Need to Schedule     Ambulatory Referral to Home Health   As directed      Face to Face Visit Date: 9/12/2020    Follow-up provider for Plan of Care?: I treated the patient in an acute care facility and will not continue treatment after discharge.    Follow-up provider: BENITEZ ZARCO [[id number]]    Reason/Clinical Findings: deconditioning r/t  pneumonia, resp failure    Describe mobility limitations that make leaving home difficult: deconditioning r/t pneumonia, resp failure    Nursing/Therapeutic Services Requested: Skilled Nursing Physical Therapy Occupational Therapy    Skilled nursing orders: Cardiopulmonary assessments    PT orders: Strengthening    Occupational orders: Strengthening    Frequency: 1 Week 1         Discharge Follow-up with PCP   As directed       Currently Documented PCP:    Pat Montiel APRN    PCP Phone Number:    [phone]     Follow Up Details: one week            Contact information for follow-up providers     Pat Montiel APRN Follow up.    Specialty: Family Medicine  Why: office will call you with appointment date and time  Contact information:  [address] [facility] [address]  [phone]             Pat Montiel APRN .    Specialty: Family Medicine  Why: one week  Contact information:  [address] [facility] [address]  [phone]                   Contact information for after-discharge care     Home Medical Care     [facility] .    Service: Home Health Services  Contact information:  [address]   [facility] [id number] [phone]                             Test Results Pending at Discharge:           This document has been electronically signed by BRITTANY Robert on September 12, 2020 17:35 CDT        Time: 30 minutes spent on assessment, discussion, management, and discharge planning on this patient.                 Electronically signed by Sylvia Norman MD at 09/12/20 1817       Discharge Order (From admission, onward)     Start     Ordered    09/12/20 1043  Discharge patient  Once     Expected Discharge Date: 09/12/20    Discharge Disposition: Home-Health Care [facility] [place]    Physician of Record for Attribution - Please select from Treatment Team: SYLVIA NORMAN [[id number]]    Review needed by CMO to determine Physician of Record:  No    Please choose which facility the patient is currently admitted if they are being discharged to another facility or unit.:  [facility]    Mode: Family       Question Answer Comment   Physician of Record for Attribution - Please select from Treatment Team SYLVIA CRURY    Review needed by CMO to determine Physician of Record No    Please choose which facility the patient is currently admitted if they are being discharged to another facility or unit.  [facility]    Mode: Family        09/12/20 1044

## 2020-09-15 ENCOUNTER — READMISSION MANAGEMENT (OUTPATIENT)
Dept: CALL CENTER | Facility: HOSPITAL | Age: 65
End: 2020-09-15

## 2020-09-15 NOTE — OUTREACH NOTE
COPD/PN Week 1 Survey      Responses   [facility] [place] patient discharged from?  [place]   COVID-19 Test Status  Negative   Does the patient have one of the following disease processes/diagnoses(primary or secondary)?  COPD/Pneumonia   Is there a successful TCM telephone encounter documented?  No   Was the primary reason for admission:  Pneumonia   Week 1 attempt successful?  No   Unsuccessful attempts  Attempt 1          Em Muir RN

## 2020-09-21 ENCOUNTER — READMISSION MANAGEMENT (OUTPATIENT)
Dept: CALL CENTER | Facility: HOSPITAL | Age: 65
End: 2020-09-21

## 2020-09-21 NOTE — OUTREACH NOTE
COPD/PN Week 1 Survey      Responses   [facility] patient discharged from?  [place]   COVID-19 Test Status  Negative   Does the patient have one of the following disease processes/diagnoses(primary or secondary)?  COPD/Pneumonia   Is there a successful TCM telephone encounter documented?  No   Was the primary reason for admission:  Pneumonia   Week 1 attempt successful?  No   Unsuccessful attempts  Attempt 2          Caitlin Nation RN

## 2020-09-24 ENCOUNTER — READMISSION MANAGEMENT (OUTPATIENT)
Dept: CALL CENTER | Facility: HOSPITAL | Age: 65
End: 2020-09-24

## 2020-09-24 NOTE — OUTREACH NOTE
COPD/PN Week 1 Survey      Responses   [facility] [place] [facility]   COVID-19 Test Status  Negative   Does the patient have one of the following disease processes/diagnoses(primary or secondary)?  COPD/Pneumonia   Is there a successful TCM telephone encounter documented?  No   Was the primary reason for admission:  Pneumonia   Week 1 attempt successful?  Yes   Call start time  1622   Call end time  1631   Discharge diagnosis  Pneumonia of both lungs due to infectious organism   Person spoke with today (if not patient) and relationship  Connie, daughter   Meds reviewed with patient/caregiver?  Yes   Is the patient having any side effects they believe may be caused by any medication additions or changes?  No   Does the patient have all medications ordered at discharge?  Yes   Is the patient taking all medications as directed (includes completed medication regime)?  Yes   Does the patient have a primary care provider?   Yes   Does the patient have an appointment with their PCP or pulmonologist within 7 days of discharge?  Yes   Has the patient kept scheduled appointments due by today?  N/A   What is the Home health agency?   declined HH due to insurance not covering   Has home health visited the patient within 72 hours of discharge?  N/A   Pulse Ox monitoring  None   Psychosocial issues?  No   Did the patient receive a copy of their discharge instructions?  Yes   Nursing interventions  Reviewed instructions with patient   What is the patient's perception of their health status since discharge?  Improving   Nursing Interventions  Nurse provided patient education   Is the patient/caregiver able to teach back the hierarchy of who to call/visit for symptoms/problems? PCP, Specialist, Home health nurse, Urgent Care, ED, 911  Yes   Additional teach back comments  daughter states pt did not have pneumonia symptoms until taken to hospital, daughter states has educated pt and encouraged pt to wash  masks more often   Is the patient/caregiver able to teach back signs and symptoms of worsening condition:  Fever/chills, Shortness of breath, Chest pain   Is the patient/caregiver able to teach back importance of completing antibiotic course of treatment?  Yes   Week 1 call completed?  Yes          Ni Aguila RN

## 2020-10-01 ENCOUNTER — READMISSION MANAGEMENT (OUTPATIENT)
Dept: CALL CENTER | Facility: HOSPITAL | Age: 65
End: 2020-10-01

## 2020-10-01 NOTE — OUTREACH NOTE
COPD/PN Week 2 Survey      Responses   [facility] patient discharged from?  [place]   Does the patient have one of the following disease processes/diagnoses(primary or secondary)?  COPD/Pneumonia   Week 2 attempt successful?  No   Unsuccessful attempts  Attempt 1          Deysi Escalante RN

## 2020-10-05 ENCOUNTER — READMISSION MANAGEMENT (OUTPATIENT)
Dept: CALL CENTER | Facility: HOSPITAL | Age: 65
End: 2020-10-05

## 2020-10-13 ENCOUNTER — READMISSION MANAGEMENT (OUTPATIENT)
Dept: CALL CENTER | Facility: HOSPITAL | Age: 65
End: 2020-10-13

## 2020-10-13 NOTE — OUTREACH NOTE
COPD/PN Week 3 Survey      Responses   [facility] patient discharged from?  [place]   Does the patient have one of the following disease processes/diagnoses(primary or secondary)?  COPD/Pneumonia   Was the primary reason for admission:  Pneumonia   Week 3 attempt successful?  No   Unsuccessful attempts  Attempt 1          Odalys Ramirez RN

## 2020-10-14 ENCOUNTER — READMISSION MANAGEMENT (OUTPATIENT)
Dept: CALL CENTER | Facility: HOSPITAL | Age: 65
End: 2020-10-14

## 2020-10-14 NOTE — OUTREACH NOTE
COPD/PN Week 3 Survey      Responses   [facility] patient discharged from?  [place]   Does the patient have one of the following disease processes/diagnoses(primary or secondary)?  COPD/Pneumonia   Was the primary reason for admission:  Pneumonia   Week 3 attempt successful?  No   Unsuccessful attempts  Attempt 2          Em Muir RN

## 2020-11-17 ENCOUNTER — TRANSCRIBE ORDERS (OUTPATIENT)
Dept: ORTHOPEDIC SURGERY | Facility: CLINIC | Age: 65
End: 2020-11-17

## 2020-11-17 DIAGNOSIS — M75.80: Primary | ICD-10-CM

## 2021-07-02 ENCOUNTER — HOSPITAL ENCOUNTER (EMERGENCY)
Facility: HOSPITAL | Age: 66
Discharge: HOME | End: 2021-07-02
Attending: STUDENT IN AN ORGANIZED HEALTH CARE EDUCATION/TRAINING PROGRAM | Admitting: STUDENT IN AN ORGANIZED HEALTH CARE EDUCATION/TRAINING PROGRAM

## 2021-07-02 VITALS
HEIGHT: 65 IN | WEIGHT: 196.5 LBS | TEMPERATURE: 96.8 F | RESPIRATION RATE: 18 BRPM | BODY MASS INDEX: 32.74 KG/M2 | HEART RATE: 101 BPM | SYSTOLIC BLOOD PRESSURE: 176 MMHG | OXYGEN SATURATION: 97 % | DIASTOLIC BLOOD PRESSURE: 90 MMHG

## 2021-07-02 DIAGNOSIS — K62.5 BRIGHT RED BLOOD PER RECTUM: Primary | ICD-10-CM

## 2021-07-02 DIAGNOSIS — E87.6 HYPOKALEMIA: ICD-10-CM

## 2021-07-02 LAB
ABO GROUP BLD: NORMAL
ANION GAP SERPL CALCULATED.3IONS-SCNC: 10 MMOL/L (ref 5–15)
APTT PPP: 27.8 SECONDS (ref 20–40.3)
BASOPHILS # BLD AUTO: 0.08 10*3/MM3 (ref 0–0.2)
BASOPHILS NFR BLD AUTO: 0.8 % (ref 0–1.5)
BLD GP AB SCN SERPL QL: NEGATIVE
BUN SERPL-MCNC: 15 MG/DL (ref 8–23)
BUN/CREAT SERPL: 15.3 (ref 7–25)
CALCIUM SPEC-SCNC: 9.6 MG/DL (ref 8.6–10.5)
CHLORIDE SERPL-SCNC: 105 MMOL/L (ref 98–107)
CO2 SERPL-SCNC: 27 MMOL/L (ref 22–29)
CREAT SERPL-MCNC: 0.98 MG/DL (ref 0.57–1)
DEPRECATED RDW RBC AUTO: 45 FL (ref 37–54)
EOSINOPHIL # BLD AUTO: 0.2 10*3/MM3 (ref 0–0.4)
EOSINOPHIL NFR BLD AUTO: 2 % (ref 0.3–6.2)
ERYTHROCYTE [DISTWIDTH] IN BLOOD BY AUTOMATED COUNT: 13.5 % (ref 12.3–15.4)
GFR SERPL CREATININE-BSD FRML MDRD: 57 ML/MIN/1.73
GLUCOSE SERPL-MCNC: 107 MG/DL (ref 65–99)
HCT VFR BLD AUTO: 44.7 % (ref 34–46.6)
HGB BLD-MCNC: 15.2 G/DL (ref 12–15.9)
HOLD SPECIMEN: NORMAL
IMM GRANULOCYTES # BLD AUTO: 0.05 10*3/MM3 (ref 0–0.05)
IMM GRANULOCYTES NFR BLD AUTO: 0.5 % (ref 0–0.5)
INR PPP: 0.9 (ref 0.8–1.2)
LYMPHOCYTES # BLD AUTO: 2.72 10*3/MM3 (ref 0.7–3.1)
LYMPHOCYTES NFR BLD AUTO: 27.4 % (ref 19.6–45.3)
Lab: NORMAL
MCH RBC QN AUTO: 31 PG (ref 26.6–33)
MCHC RBC AUTO-ENTMCNC: 34 G/DL (ref 31.5–35.7)
MCV RBC AUTO: 91 FL (ref 79–97)
MONOCYTES # BLD AUTO: 0.74 10*3/MM3 (ref 0.1–0.9)
MONOCYTES NFR BLD AUTO: 7.5 % (ref 5–12)
NEUTROPHILS NFR BLD AUTO: 6.12 10*3/MM3 (ref 1.7–7)
NEUTROPHILS NFR BLD AUTO: 61.8 % (ref 42.7–76)
NRBC BLD AUTO-RTO: 0 /100 WBC (ref 0–0.2)
PLATELET # BLD AUTO: 296 10*3/MM3 (ref 140–450)
PMV BLD AUTO: 9.9 FL (ref 6–12)
POTASSIUM SERPL-SCNC: 3.1 MMOL/L (ref 3.5–5.2)
PROTHROMBIN TIME: 12.1 SECONDS (ref 11.1–15.3)
RBC # BLD AUTO: 4.91 10*6/MM3 (ref 3.77–5.28)
RH BLD: POSITIVE
SODIUM SERPL-SCNC: 142 MMOL/L (ref 136–145)
T&S EXPIRATION DATE: NORMAL
WBC # BLD AUTO: 9.91 10*3/MM3 (ref 3.4–10.8)

## 2021-07-02 PROCEDURE — 86850 RBC ANTIBODY SCREEN: CPT | Performed by: STUDENT IN AN ORGANIZED HEALTH CARE EDUCATION/TRAINING PROGRAM

## 2021-07-02 PROCEDURE — 99283 EMERGENCY DEPT VISIT LOW MDM: CPT

## 2021-07-02 PROCEDURE — 86901 BLOOD TYPING SEROLOGIC RH(D): CPT | Performed by: STUDENT IN AN ORGANIZED HEALTH CARE EDUCATION/TRAINING PROGRAM

## 2021-07-02 PROCEDURE — 96374 THER/PROPH/DIAG INJ IV PUSH: CPT

## 2021-07-02 PROCEDURE — 80048 BASIC METABOLIC PNL TOTAL CA: CPT | Performed by: STUDENT IN AN ORGANIZED HEALTH CARE EDUCATION/TRAINING PROGRAM

## 2021-07-02 PROCEDURE — 25010000002 HYDRALAZINE PER 20 MG: Performed by: STUDENT IN AN ORGANIZED HEALTH CARE EDUCATION/TRAINING PROGRAM

## 2021-07-02 PROCEDURE — 85730 THROMBOPLASTIN TIME PARTIAL: CPT | Performed by: STUDENT IN AN ORGANIZED HEALTH CARE EDUCATION/TRAINING PROGRAM

## 2021-07-02 PROCEDURE — 85610 PROTHROMBIN TIME: CPT | Performed by: STUDENT IN AN ORGANIZED HEALTH CARE EDUCATION/TRAINING PROGRAM

## 2021-07-02 PROCEDURE — 85025 COMPLETE CBC W/AUTO DIFF WBC: CPT | Performed by: STUDENT IN AN ORGANIZED HEALTH CARE EDUCATION/TRAINING PROGRAM

## 2021-07-02 PROCEDURE — 86900 BLOOD TYPING SEROLOGIC ABO: CPT | Performed by: STUDENT IN AN ORGANIZED HEALTH CARE EDUCATION/TRAINING PROGRAM

## 2021-07-02 RX ORDER — HYDRALAZINE HYDROCHLORIDE 20 MG/ML
20 INJECTION INTRAMUSCULAR; INTRAVENOUS ONCE
Status: COMPLETED | OUTPATIENT
Start: 2021-07-02 | End: 2021-07-02

## 2021-07-02 RX ORDER — POTASSIUM CHLORIDE 750 MG/1
40 CAPSULE, EXTENDED RELEASE ORAL ONCE
Status: COMPLETED | OUTPATIENT
Start: 2021-07-02 | End: 2021-07-02

## 2021-07-02 RX ADMIN — POTASSIUM CHLORIDE 40 MEQ: 750 CAPSULE, EXTENDED RELEASE ORAL at 15:45

## 2021-07-02 RX ADMIN — HYDRALAZINE HYDROCHLORIDE 20 MG: 20 INJECTION INTRAMUSCULAR; INTRAVENOUS at 15:51

## 2021-07-02 NOTE — ED PROVIDER NOTES
Subjective   65-year-old female history of hypertension and breast cancer comes to the ER with 1 day history of bright red blood per rectum.  Only occurs when she has a bowel movement.  No pain.  No belly pain.  No urinary symptoms.  No vaginal bleeding or discharge.  She is tolerating p.o. intake well without changes in appetite, no nausea or vomiting.  Patient denies melena.  She denies constipation or hard bowel movements.      History provided by:  Patient   used: No        Review of Systems   Constitutional: Negative for chills and fever.   HENT: Negative for congestion and rhinorrhea.    Respiratory: Negative for cough and shortness of breath.    Cardiovascular: Negative for chest pain and palpitations.   Gastrointestinal: Positive for blood in stool. Negative for abdominal distention, abdominal pain, constipation, diarrhea, nausea, rectal pain and vomiting.   Genitourinary: Negative for dysuria and flank pain.   Musculoskeletal: Negative for back pain.   Skin: Negative for color change and rash.   Neurological: Negative for dizziness and headaches.   Psychiatric/Behavioral: Negative for agitation. The patient is not nervous/anxious.        Past Medical History:   Diagnosis Date   • Acute bronchitis, unspecified    • Acute exacerbation of chronic bronchitis (CMS/HCC)    • Acute exacerbation of chronic bronchitis (CMS/HCC)    • Acute frontal sinusitis, unspecified    • Acute laryngitis    • Allergic rhinitis    • Anxiety    • Cancer (CMS/HCC)    • Chronic back pain    • Encounter for therapeutic drug monitoring    • Family history of polyps in the colon    • H/O bone density study 02/11/2014   • H/O mammogram 02/11/2014   • Hyperlipidemia    • Hypertension    • Malignant neoplasm of female breast (CMS/HCC)      L breast, sp mastectomy      • Osteopenia    • Panic disorder    • Right lower quadrant pain     rule out appendicitis          Allergies   Allergen Reactions   • Augmentin  "[Amoxicillin-Pot Clavulanate]    • Ciprofloxacin    • Codeine    • Eggs Or Egg-Derived Products Unknown - High Severity     Per pt she can have eggs in cooked fooods   • Paxil [Paroxetine Hcl]        Past Surgical History:   Procedure Laterality Date   • INJECTION OF MEDICATION  05/26/2016    Ramon(3)   • MASTECTOMY  2003    left breast for cancer   • PAP SMEAR  01/23/2013    negative for malignancy       History reviewed. No pertinent family history.    Social History     Socioeconomic History   • Marital status: Legally      Spouse name: Not on file   • Number of children: Not on file   • Years of education: Not on file   • Highest education level: Not on file   Tobacco Use   • Smoking status: Current Every Day Smoker     Packs/day: 0.50   Substance and Sexual Activity   • Alcohol use: No   • Drug use: No           Objective    Vitals:    07/02/21 1418 07/02/21 1507 07/02/21 1546   BP: (!) 224/117 105/53    BP Location: Right arm Right arm    Patient Position: Sitting Sitting    Pulse: 109 52 101   Resp: 20 17 18   Temp: 96.8 °F (36 °C)     TempSrc: Infrared     SpO2: 95% 98% 94%   Weight: 89.1 kg (196 lb 8 oz)     Height: 165.1 cm (65\")         Physical Exam  Vitals and nursing note reviewed.   Constitutional:       General: She is not in acute distress.     Appearance: She is well-developed. She is obese. She is not ill-appearing, toxic-appearing or diaphoretic.   HENT:      Head: Normocephalic.      Right Ear: External ear normal.      Left Ear: External ear normal.   Pulmonary:      Effort: Pulmonary effort is normal. No accessory muscle usage or respiratory distress.      Breath sounds: No decreased breath sounds or wheezing.   Chest:      Chest wall: No tenderness.   Abdominal:      General: Bowel sounds are normal.      Palpations: Abdomen is soft.      Tenderness: There is no abdominal tenderness (deep palpation). There is no right CVA tenderness, left CVA tenderness, guarding or rebound. "   Skin:     General: Skin is warm and dry.      Capillary Refill: Capillary refill takes less than 2 seconds.   Neurological:      Mental Status: She is alert and oriented to person, place, and time. Mental status is at baseline. She is not disoriented.   Psychiatric:         Behavior: Behavior normal.         Procedures           ED Course      Results for orders placed or performed during the hospital encounter of 07/02/21   Basic Metabolic Panel    Specimen: Blood   Result Value Ref Range    Glucose 107 (H) 65 - 99 mg/dL    BUN 15 8 - 23 mg/dL    Creatinine 0.98 0.57 - 1.00 mg/dL    Sodium 142 136 - 145 mmol/L    Potassium 3.1 (L) 3.5 - 5.2 mmol/L    Chloride 105 98 - 107 mmol/L    CO2 27.0 22.0 - 29.0 mmol/L    Calcium 9.6 8.6 - 10.5 mg/dL    eGFR Non African Amer 57 (L) >60 mL/min/1.73    BUN/Creatinine Ratio 15.3 7.0 - 25.0    Anion Gap 10.0 5.0 - 15.0 mmol/L   Protime-INR    Specimen: Blood   Result Value Ref Range    Protime 12.1 11.1 - 15.3 Seconds    INR 0.90 0.80 - 1.20   aPTT    Specimen: Blood   Result Value Ref Range    PTT 27.8 20.0 - 40.3 seconds   CBC Auto Differential    Specimen: Blood   Result Value Ref Range    WBC 9.91 3.40 - 10.80 10*3/mm3    RBC 4.91 3.77 - 5.28 10*6/mm3    Hemoglobin 15.2 12.0 - 15.9 g/dL    Hematocrit 44.7 34.0 - 46.6 %    MCV 91.0 79.0 - 97.0 fL    MCH 31.0 26.6 - 33.0 pg    MCHC 34.0 31.5 - 35.7 g/dL    RDW 13.5 12.3 - 15.4 %    RDW-SD 45.0 37.0 - 54.0 fl    MPV 9.9 6.0 - 12.0 fL    Platelets 296 140 - 450 10*3/mm3    Neutrophil % 61.8 42.7 - 76.0 %    Lymphocyte % 27.4 19.6 - 45.3 %    Monocyte % 7.5 5.0 - 12.0 %    Eosinophil % 2.0 0.3 - 6.2 %    Basophil % 0.8 0.0 - 1.5 %    Immature Grans % 0.5 0.0 - 0.5 %    Neutrophils, Absolute 6.12 1.70 - 7.00 10*3/mm3    Lymphocytes, Absolute 2.72 0.70 - 3.10 10*3/mm3    Monocytes, Absolute 0.74 0.10 - 0.90 10*3/mm3    Eosinophils, Absolute 0.20 0.00 - 0.40 10*3/mm3    Basophils, Absolute 0.08 0.00 - 0.20 10*3/mm3    Immature  Grans, Absolute 0.05 0.00 - 0.05 10*3/mm3    nRBC 0.0 0.0 - 0.2 /100 WBC   Type & Screen    Specimen: Blood   Result Value Ref Range    ABO Type O     RH type Positive     Antibody Screen Negative     T&S Expiration Date 7/5/2021 11:59:59 PM    Gold Top - [facility]   Result Value Ref Range    Extra Tube Hold for add-ons.          MDM  Number of Diagnoses or Management Options  Bright red blood per rectum: new and requires workup  Hypokalemia: new and requires workup  Diagnosis management comments: Vital signs are stable, afebrile.  Patient received hydralazine for her blood pressure.  Patient reports that she has not taken her BP meds at home today..  Lab significant for hemoglobin of 15.2, potassium 3.1.  Potassium replaced in the ER.  On reevaluation, patient is alert and resting comfortably. I discussed the results of the emergency department evaluation.  I recommended primary care follow-up.  Return precautions discussed.      Final diagnoses:   Bright red blood per rectum   Hypokalemia       ED Disposition  ED Disposition     ED Disposition Condition Comment    Discharge Stable           Annamaria Silvestre, APRN  [address] [facility] [address]  [phone]    Schedule an appointment as soon as possible for a visit in 2 days  ER follow up         Medication List      No changes were made to your prescriptions during this visit.             Neil Ojeda MD  07/02/21 2117

## 2021-08-17 ENCOUNTER — HOSPITAL ENCOUNTER (EMERGENCY)
Facility: HOSPITAL | Age: 66
Discharge: HOME | End: 2021-08-17
Attending: FAMILY MEDICINE | Admitting: FAMILY MEDICINE

## 2021-08-17 VITALS
TEMPERATURE: 97.5 F | SYSTOLIC BLOOD PRESSURE: 146 MMHG | WEIGHT: 196 LBS | HEIGHT: 66 IN | HEART RATE: 78 BPM | DIASTOLIC BLOOD PRESSURE: 98 MMHG | BODY MASS INDEX: 31.5 KG/M2 | RESPIRATION RATE: 18 BRPM | OXYGEN SATURATION: 95 %

## 2021-08-17 DIAGNOSIS — M54.42 ACUTE LEFT-SIDED LOW BACK PAIN WITH LEFT-SIDED SCIATICA: Primary | ICD-10-CM

## 2021-08-17 PROCEDURE — 99282 EMERGENCY DEPT VISIT SF MDM: CPT

## 2021-08-17 RX ORDER — HYDROCODONE BITARTRATE AND ACETAMINOPHEN 5; 325 MG/1; MG/1
1 TABLET ORAL EVERY 6 HOURS PRN
Qty: 12 TABLET | Refills: 0 | Status: SHIPPED | OUTPATIENT
Start: 2021-08-17

## 2021-08-17 NOTE — ED PROVIDER NOTES
Subjective   Patient presents to emergency department for acute on chronic low back.  States it is left sided with radicular pain down to her left anterior thigh.  Denies numbness/tingling/weakness, bowel/bladder changes, previous back surgeries, fever/chills.  States she has been taking Ibuprofen 800mg TID without relief.  Pain is worse with ambulation and prolonged sitting.  States she has taken Hydrocodone in the past which has provided temproary relief.        History provided by:  Patient   used: No        Review of Systems   Constitutional: Negative for chills and fever.   HENT: Negative for sore throat and trouble swallowing.    Eyes: Negative for visual disturbance.   Respiratory: Negative for shortness of breath and wheezing.    Cardiovascular: Negative for chest pain.   Gastrointestinal: Negative for abdominal pain, nausea and vomiting.   Genitourinary: Negative for dysuria and flank pain.   Musculoskeletal: Positive for back pain.   Skin: Negative for color change.   Allergic/Immunologic: Negative for immunocompromised state.   Neurological: Negative for weakness and numbness.   Psychiatric/Behavioral: Negative for confusion.       Past Medical History:   Diagnosis Date   • Acute bronchitis, unspecified    • Acute exacerbation of chronic bronchitis (CMS/HCC)    • Acute exacerbation of chronic bronchitis (CMS/HCC)    • Acute frontal sinusitis, unspecified    • Acute laryngitis    • Allergic rhinitis    • Anxiety    • Cancer (CMS/HCC)    • Chronic back pain    • Encounter for therapeutic drug monitoring    • Family history of polyps in the colon    • H/O bone density study 02/11/2014   • H/O mammogram 02/11/2014   • Hyperlipidemia    • Hypertension    • Malignant neoplasm of female breast (CMS/HCC)      L breast, sp mastectomy      • Osteopenia    • Panic disorder    • Right lower quadrant pain     rule out appendicitis          Allergies   Allergen Reactions   • Augmentin  "[Amoxicillin-Pot Clavulanate]    • Ciprofloxacin    • Codeine    • Eggs Or Egg-Derived Products Unknown - High Severity     Per pt she can have eggs in cooked fooods   • Paxil [Paroxetine Hcl]        Past Surgical History:   Procedure Laterality Date   • INJECTION OF MEDICATION  05/26/2016    Ramon(3)   • MASTECTOMY  2003    left breast for cancer   • PAP SMEAR  01/23/2013    negative for malignancy       History reviewed. No pertinent family history.    Social History     Socioeconomic History   • Marital status: Legally      Spouse name: Not on file   • Number of children: Not on file   • Years of education: Not on file   • Highest education level: Not on file   Tobacco Use   • Smoking status: Current Every Day Smoker     Packs/day: 0.50   • Smokeless tobacco: Never Used   Substance and Sexual Activity   • Alcohol use: No   • Drug use: No           Objective      /98 (BP Location: Right arm, Patient Position: Sitting)   Pulse 78   Temp 97.5 °F (36.4 °C) (Oral)   Resp 18   Ht 166.4 cm (65.5\")   Wt 88.9 kg (196 lb)   SpO2 95%   BMI 32.12 kg/m²     Physical Exam  Vitals and nursing note reviewed.   Constitutional:       Appearance: Normal appearance.   HENT:      Head: Normocephalic and atraumatic.      Mouth/Throat:      Pharynx: Oropharynx is clear.   Eyes:      Conjunctiva/sclera: Conjunctivae normal.   Cardiovascular:      Rate and Rhythm: Normal rate.   Pulmonary:      Effort: Pulmonary effort is normal. No respiratory distress.      Breath sounds: No wheezing.   Musculoskeletal:         General: Tenderness (left lower back) present.   Skin:     General: Skin is warm.   Neurological:      General: No focal deficit present.      Mental Status: She is alert. Mental status is at baseline.   Psychiatric:         Mood and Affect: Mood normal.         Behavior: Behavior normal.         Thought Content: Thought content normal.         Procedures           ED Course  ED Course as of Aug 17 1301 "   Tue Aug 17, 2021   1153 Patient states she does not want imaging at this time, just something to help with the pain.      [DENNIS]   1210 Reviewed eKASPER #[id number].    [DENNIS]      ED Course User Index  [DENNIS] Jake Rojas PA-C      Results for orders placed or performed during the hospital encounter of 07/02/21   Basic Metabolic Panel    Specimen: Blood   Result Value Ref Range    Glucose 107 (H) 65 - 99 mg/dL    BUN 15 8 - 23 mg/dL    Creatinine 0.98 0.57 - 1.00 mg/dL    Sodium 142 136 - 145 mmol/L    Potassium 3.1 (L) 3.5 - 5.2 mmol/L    Chloride 105 98 - 107 mmol/L    CO2 27.0 22.0 - 29.0 mmol/L    Calcium 9.6 8.6 - 10.5 mg/dL    eGFR Non African Amer 57 (L) >60 mL/min/1.73    BUN/Creatinine Ratio 15.3 7.0 - 25.0    Anion Gap 10.0 5.0 - 15.0 mmol/L   Protime-INR    Specimen: Blood   Result Value Ref Range    Protime 12.1 11.1 - 15.3 Seconds    INR 0.90 0.80 - 1.20   aPTT    Specimen: Blood   Result Value Ref Range    PTT 27.8 20.0 - 40.3 seconds   CBC Auto Differential    Specimen: Blood   Result Value Ref Range    WBC 9.91 3.40 - 10.80 10*3/mm3    RBC 4.91 3.77 - 5.28 10*6/mm3    Hemoglobin 15.2 12.0 - 15.9 g/dL    Hematocrit 44.7 34.0 - 46.6 %    MCV 91.0 79.0 - 97.0 fL    MCH 31.0 26.6 - 33.0 pg    MCHC 34.0 31.5 - 35.7 g/dL    RDW 13.5 12.3 - 15.4 %    RDW-SD 45.0 37.0 - 54.0 fl    MPV 9.9 6.0 - 12.0 fL    Platelets 296 140 - 450 10*3/mm3    Neutrophil % 61.8 42.7 - 76.0 %    Lymphocyte % 27.4 19.6 - 45.3 %    Monocyte % 7.5 5.0 - 12.0 %    Eosinophil % 2.0 0.3 - 6.2 %    Basophil % 0.8 0.0 - 1.5 %    Immature Grans % 0.5 0.0 - 0.5 %    Neutrophils, Absolute 6.12 1.70 - 7.00 10*3/mm3    Lymphocytes, Absolute 2.72 0.70 - 3.10 10*3/mm3    Monocytes, Absolute 0.74 0.10 - 0.90 10*3/mm3    Eosinophils, Absolute 0.20 0.00 - 0.40 10*3/mm3    Basophils, Absolute 0.08 0.00 - 0.20 10*3/mm3    Immature Grans, Absolute 0.05 0.00 - 0.05 10*3/mm3    nRBC 0.0 0.0 - 0.2 /100 WBC   Type & Screen    Specimen: Blood    Result Value Ref Range    ABO Type O     RH type Positive     Antibody Screen Negative     T&S Expiration Date 7/5/2021 11:59:59 PM    PREVIOUS HISTORY    Specimen: Blood   Result Value Ref Range    Previous History No record on File    [facility] - [facility]   Result Value Ref Range    Extra Tube Hold for add-ons.                                           MDM    Final diagnoses:   Acute left-sided low back pain with left-sided sciatica       ED Disposition  ED Disposition     ED Disposition Condition Comment    Discharge Stable           Annamaria Silvestre, APRN  [address] [id number] [phone]    Call in 1 day           Medication List      New Prescriptions    HYDROcodone-acetaminophen 5-325 MG per tablet  Commonly known as: NORCO  Take 1 tablet by mouth Every 6 (Six) Hours As Needed for Moderate Pain .           Where to Get Your Medications      These medications were sent to [facility] - [place], [place] - [address] [place] [address] - [phone]  - [phone] [address] [address]    Phone: [phone]   · HYDROcodone-acetaminophen 5-325 MG per tablet          Jake Rojas PA-C  08/17/21 1301

## 2023-02-14 ENCOUNTER — HOSPITAL ENCOUNTER (EMERGENCY)
Facility: HOSPITAL | Age: 68
Discharge: HOME | End: 2023-02-14
Attending: EMERGENCY MEDICINE | Admitting: STUDENT IN AN ORGANIZED HEALTH CARE EDUCATION/TRAINING PROGRAM
Payer: MEDICARE

## 2023-02-14 VITALS
RESPIRATION RATE: 20 BRPM | HEART RATE: 96 BPM | TEMPERATURE: 98.3 F | HEIGHT: 66 IN | BODY MASS INDEX: 31.82 KG/M2 | WEIGHT: 198 LBS | DIASTOLIC BLOOD PRESSURE: 97 MMHG | OXYGEN SATURATION: 96 % | SYSTOLIC BLOOD PRESSURE: 174 MMHG

## 2023-02-14 DIAGNOSIS — B37.2 CUTANEOUS CANDIDIASIS: ICD-10-CM

## 2023-02-14 DIAGNOSIS — Z88.9 H/O SEASONAL ALLERGIES: Primary | ICD-10-CM

## 2023-02-14 PROCEDURE — 99283 EMERGENCY DEPT VISIT LOW MDM: CPT

## 2023-02-14 RX ORDER — FLUTICASONE PROPIONATE 50 MCG
2 SPRAY, SUSPENSION (ML) NASAL DAILY
Qty: 18.2 ML | Refills: 0 | Status: SHIPPED | OUTPATIENT
Start: 2023-02-14 | End: 2023-03-16

## 2023-02-14 RX ORDER — NYSTATIN 100000 U/G
1 CREAM TOPICAL 2 TIMES DAILY
Qty: 14 G | Refills: 0 | Status: SHIPPED | OUTPATIENT
Start: 2023-02-14 | End: 2023-02-21

## 2023-02-14 RX ORDER — CETIRIZINE HYDROCHLORIDE 10 MG/1
10 TABLET ORAL DAILY
Qty: 30 TABLET | Refills: 0 | Status: SHIPPED | OUTPATIENT
Start: 2023-02-14 | End: 2023-03-16

## 2023-02-14 RX ORDER — DIPHENHYDRAMINE HCL 25 MG
25 CAPSULE ORAL EVERY 6 HOURS PRN
Qty: 28 CAPSULE | Refills: 0 | Status: SHIPPED | OUTPATIENT
Start: 2023-02-14 | End: 2023-02-21

## 2023-02-14 RX ORDER — NYSTATIN 100000 U/G
1 CREAM TOPICAL ONCE
Status: COMPLETED | OUTPATIENT
Start: 2023-02-14 | End: 2023-02-14

## 2023-02-14 RX ADMIN — NYSTATIN 1 APPLICATION: 100000 CREAM TOPICAL at 19:22
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
## 2023-02-15 NOTE — DISCHARGE INSTRUCTIONS
Home to rest. Use cream to forearm as prescribed and follow up if no improvement. May use flonase for ear fullness and itching. May use zyrtec for seasonal allergies. May use benadryl at night for itching. Follow up with your primary care provider.